# Patient Record
Sex: FEMALE | Race: WHITE | NOT HISPANIC OR LATINO | Employment: OTHER | URBAN - METROPOLITAN AREA
[De-identification: names, ages, dates, MRNs, and addresses within clinical notes are randomized per-mention and may not be internally consistent; named-entity substitution may affect disease eponyms.]

---

## 2019-03-19 ENCOUNTER — MEDICAL CORRESPONDENCE (OUTPATIENT)
Dept: HEALTH INFORMATION MANAGEMENT | Facility: CLINIC | Age: 39
End: 2019-03-19

## 2020-01-08 ENCOUNTER — TRANSFERRED RECORDS (OUTPATIENT)
Dept: HEALTH INFORMATION MANAGEMENT | Facility: CLINIC | Age: 40
End: 2020-01-08

## 2020-01-08 LAB
CREATININE (EXTERNAL): 0.82 MG/DL (ref 0.57–1)
GFR ESTIMATED (EXTERNAL): 90 ML/MIN/1.73
GFR ESTIMATED (IF AFRICAN AMERICAN) (EXTERNAL): 104 ML/MIN/1.73
GLUCOSE (EXTERNAL): 87 MG/DL (ref 65–99)
POTASSIUM (EXTERNAL): 4.1 MMOL/L (ref 3.5–5.2)

## 2020-04-20 ENCOUNTER — TRANSFERRED RECORDS (OUTPATIENT)
Dept: HEALTH INFORMATION MANAGEMENT | Facility: CLINIC | Age: 40
End: 2020-04-20

## 2021-05-19 ENCOUNTER — TRANSFERRED RECORDS (OUTPATIENT)
Dept: HEALTH INFORMATION MANAGEMENT | Facility: CLINIC | Age: 41
End: 2021-05-19

## 2021-05-19 LAB
ALT SERPL-CCNC: 13 IU/L (ref 0–32)
AST SERPL-CCNC: 19 IU/L (ref 0–40)
CHOLESTEROL (EXTERNAL): 175 MG/DL (ref 100–199)
CREATININE (EXTERNAL): 0.79 MG/DL (ref 0.57–1)
GFR ESTIMATED (EXTERNAL): 93 ML/MIN/1.73
GFR ESTIMATED (IF AFRICAN AMERICAN) (EXTERNAL): 108 ML/MIN/1.73
GLUCOSE (EXTERNAL): 88 MG/DL (ref 65–99)
HDLC SERPL-MCNC: 51 MG/DL
HPV ABSTRACT: NORMAL
LDL CHOLESTEROL CALCULATED (EXTERNAL): 91 MG/DL (ref 0–99)
PAP-ABSTRACT: NORMAL
POTASSIUM (EXTERNAL): 4 MMOL/L (ref 3.5–5.2)
TRIGLYCERIDES (EXTERNAL): 196 MG/DL (ref 0–149)
TSH SERPL-ACNC: 3.35 UIU/ML (ref 0.45–4.5)

## 2021-05-20 ENCOUNTER — TRANSFERRED RECORDS (OUTPATIENT)
Dept: HEALTH INFORMATION MANAGEMENT | Facility: CLINIC | Age: 41
End: 2021-05-20

## 2021-05-24 ENCOUNTER — TRANSFERRED RECORDS (OUTPATIENT)
Dept: HEALTH INFORMATION MANAGEMENT | Facility: CLINIC | Age: 41
End: 2021-05-24

## 2022-01-04 ENCOUNTER — LAB REQUISITION (OUTPATIENT)
Dept: LAB | Facility: CLINIC | Age: 42
End: 2022-01-04

## 2022-01-04 DIAGNOSIS — Z20.828 CONTACT WITH AND (SUSPECTED) EXPOSURE TO OTHER VIRAL COMMUNICABLE DISEASES: ICD-10-CM

## 2022-01-04 PROCEDURE — U0003 INFECTIOUS AGENT DETECTION BY NUCLEIC ACID (DNA OR RNA); SEVERE ACUTE RESPIRATORY SYNDROME CORONAVIRUS 2 (SARS-COV-2) (CORONAVIRUS DISEASE [COVID-19]), AMPLIFIED PROBE TECHNIQUE, MAKING USE OF HIGH THROUGHPUT TECHNOLOGIES AS DESCRIBED BY CMS-2020-01-R: HCPCS | Performed by: NURSE PRACTITIONER

## 2022-01-05 LAB — SARS-COV-2 RNA RESP QL NAA+PROBE: POSITIVE

## 2022-01-08 ENCOUNTER — TRANSFERRED RECORDS (OUTPATIENT)
Dept: HEALTH INFORMATION MANAGEMENT | Facility: CLINIC | Age: 42
End: 2022-01-08
Payer: COMMERCIAL

## 2022-05-19 ENCOUNTER — TRANSFERRED RECORDS (OUTPATIENT)
Dept: HEALTH INFORMATION MANAGEMENT | Facility: CLINIC | Age: 42
End: 2022-05-19
Payer: COMMERCIAL

## 2022-06-01 ENCOUNTER — TRANSFERRED RECORDS (OUTPATIENT)
Dept: HEALTH INFORMATION MANAGEMENT | Facility: CLINIC | Age: 42
End: 2022-06-01
Payer: COMMERCIAL

## 2022-06-07 ENCOUNTER — OFFICE VISIT (OUTPATIENT)
Dept: FAMILY MEDICINE | Facility: CLINIC | Age: 42
End: 2022-06-07
Payer: COMMERCIAL

## 2022-06-07 VITALS
TEMPERATURE: 97.9 F | DIASTOLIC BLOOD PRESSURE: 79 MMHG | SYSTOLIC BLOOD PRESSURE: 113 MMHG | HEIGHT: 67 IN | HEART RATE: 66 BPM | OXYGEN SATURATION: 96 % | WEIGHT: 178 LBS | BODY MASS INDEX: 27.94 KG/M2

## 2022-06-07 DIAGNOSIS — J02.9 ACUTE PHARYNGITIS, UNSPECIFIED ETIOLOGY: ICD-10-CM

## 2022-06-07 DIAGNOSIS — Z11.3 ROUTINE SCREENING FOR STI (SEXUALLY TRANSMITTED INFECTION): Primary | ICD-10-CM

## 2022-06-07 DIAGNOSIS — F41.9 ANXIETY: ICD-10-CM

## 2022-06-07 PROCEDURE — 86780 TREPONEMA PALLIDUM: CPT | Performed by: NURSE PRACTITIONER

## 2022-06-07 PROCEDURE — 87491 CHLMYD TRACH DNA AMP PROBE: CPT | Performed by: NURSE PRACTITIONER

## 2022-06-07 PROCEDURE — 87389 HIV-1 AG W/HIV-1&-2 AB AG IA: CPT | Performed by: NURSE PRACTITIONER

## 2022-06-07 PROCEDURE — 87591 N.GONORRHOEAE DNA AMP PROB: CPT | Performed by: NURSE PRACTITIONER

## 2022-06-07 RX ORDER — AMOXICILLIN 875 MG
875 TABLET ORAL DAILY
COMMUNITY
End: 2022-07-01

## 2022-06-07 RX ORDER — ALPRAZOLAM 0.25 MG
0.25 TABLET ORAL 3 TIMES DAILY PRN
Qty: 10 TABLET | Refills: 0 | Status: SHIPPED | OUTPATIENT
Start: 2022-06-07 | End: 2022-10-05

## 2022-06-07 NOTE — PATIENT INSTRUCTIONS
Tonsillitis resolving  Complete augmentin  Maintain hydration  Salt water gargles  Return or seek care if recurrent symptoms    Anxiety  Obtain records; nothing on   10 pills 0.25mg up to 3 times per day for anxiety sent to pharmacy. No refills until records received and reviewed.    Schedule PE for other concerns

## 2022-06-07 NOTE — NURSING NOTE
"ROOM:[address] [place], [place] [address]ELIAZAR    Preferred Name: Mindy     42 year old  Chief Complaint   Patient presents with     hospital follow up     establish care       Blood pressure 113/79, pulse 66, temperature 97.9  F (36.6  C), temperature source Oral, height 1.702 m (5' 7\"), weight 80.7 kg (178 lb), last menstrual period 06/05/2022, SpO2 96 %. Body mass index is 27.88 kg/m .  BP completed using cuff size:    There is no problem list on file for this patient.      Wt Readings from Last 2 Encounters:   06/07/22 80.7 kg (178 lb)     BP Readings from Last 3 Encounters:   06/07/22 113/79       No Known Allergies    Current Outpatient Medications   Medication     amoxicillin (AMOXIL) 875 MG tablet     No current facility-administered medications for this visit.       Social History     Tobacco Use     Smoking status: Former Smoker     Smokeless tobacco: Never Used   Vaping Use     Vaping Use: Never used   Substance Use Topics     Alcohol use: Not Currently     Comment: former     Drug use: Never       Honoring Choices - Health Care Directive Guide offered to patient at time of visit.    Health Maintenance Due   Topic Date Due     PREVENTIVE CARE VISIT  Never done     ADVANCE CARE PLANNING  Never done     HIV SCREENING  Never done     HEPATITIS C SCREENING  Never done     PAP  Never done       Immunization History   Administered Date(s) Administered     COVID-19,PF,Pfizer 12+ Yrs (2022 and After) 04/08/2022       No results found for: PAP    No lab results found.    PHQ-2 ( 1999 Pfizer) 6/7/2022   Q1: Little interest or pleasure in doing things 1   Q2: Feeling down, depressed or hopeless 1   PHQ-2 Score 2       No flowsheet data found.    No flowsheet data found.    No flowsheet data found.    Memo Seals    June 7, 2022 10:04 AM    "

## 2022-06-07 NOTE — LETTER
June 8, 2022      Mindy VIRI La  [address] [place] [id number]        Dear ,    We are writing to inform you of your test results.    Your test results fall within the expected range(s) or remain unchanged from previous results.  Please continue with current treatment plan.    Resulted Orders   NEISSERIA GONORRHOEA PCR   Result Value Ref Range    Neisseria gonorrhoeae Negative Negative      Comment:      Negative for N. gonorrhoeae rRNA by transcription mediated amplification. A negative result by transcription mediated amplification does not preclude the presence of C. trachomatis infection because results are dependent on proper and adequate collection, absence of inhibitors and sufficient rRNA to be detected.   CHLAMYDIA TRACHOMATIS PCR   Result Value Ref Range    Chlamydia trachomatis Negative Negative      Comment:      A negative result by transcription mediated amplification does not preclude the presence of C. trachomatis infection because results are dependent on proper and adequate collection, absence of inhibitors and sufficient rRNA to be detected.   NEISSERIA GONORRHOEA PCR   Result Value Ref Range    Neisseria gonorrhoeae Negative Negative      Comment:      Negative for N. gonorrhoeae rRNA by transcription mediated amplification. A negative result by transcription mediated amplification does not preclude the presence of C. trachomatis infection because results are dependent on proper and adequate collection, absence of inhibitors and sufficient rRNA to be detected.   CHLAMYDIA TRACHOMATIS PCR   Result Value Ref Range    Chlamydia trachomatis Negative Negative      Comment:      A negative result by transcription mediated amplification does not preclude the presence of C. trachomatis infection because results are dependent on proper and adequate collection, absence of inhibitors and sufficient rRNA to be detected.   HIV Antigen Antibody Combo   Result Value Ref Range     HIV Antigen Antibody Combo Nonreactive Nonreactive      Comment:      HIV-1 p24 Ag & HIV-1/HIV-2 Ab Not Detected   Treponema Abs w Reflex to RPR and Titer   Result Value Ref Range    Treponema Antibody Total Nonreactive Nonreactive     Hi Mindy, all of your testing for sexually transmitted infections are negative. Thank you.   If you have any questions or concerns, please call the clinic at the number listed above.       Sincerely,      MELA Mathur CNP

## 2022-06-08 LAB
C TRACH DNA SPEC QL NAA+PROBE: NEGATIVE
C TRACH DNA SPEC QL NAA+PROBE: NEGATIVE
HIV 1+2 AB+HIV1 P24 AG SERPL QL IA: NONREACTIVE
N GONORRHOEA DNA SPEC QL NAA+PROBE: NEGATIVE
N GONORRHOEA DNA SPEC QL NAA+PROBE: NEGATIVE
T PALLIDUM AB SER QL: NONREACTIVE

## 2022-06-13 ENCOUNTER — OFFICE VISIT (OUTPATIENT)
Dept: FAMILY MEDICINE | Facility: CLINIC | Age: 42
End: 2022-06-13

## 2022-06-13 VITALS
HEART RATE: 69 BPM | OXYGEN SATURATION: 96 % | HEIGHT: 67 IN | SYSTOLIC BLOOD PRESSURE: 110 MMHG | DIASTOLIC BLOOD PRESSURE: 72 MMHG | WEIGHT: 178 LBS | TEMPERATURE: 98.4 F | BODY MASS INDEX: 27.94 KG/M2

## 2022-06-13 DIAGNOSIS — Z87.898 HISTORY OF MOTION SICKNESS: ICD-10-CM

## 2022-06-13 DIAGNOSIS — U09.9 COVID-19 LONG HAULER: ICD-10-CM

## 2022-06-13 DIAGNOSIS — Z00.00 ROUTINE HISTORY AND PHYSICAL EXAMINATION OF ADULT: Primary | ICD-10-CM

## 2022-06-13 DIAGNOSIS — F90.9 ATTENTION DEFICIT HYPERACTIVITY DISORDER (ADHD), UNSPECIFIED ADHD TYPE: ICD-10-CM

## 2022-06-13 DIAGNOSIS — F41.9 ANXIETY: ICD-10-CM

## 2022-06-13 RX ORDER — SCOLOPAMINE TRANSDERMAL SYSTEM 1 MG/1
1 PATCH, EXTENDED RELEASE TRANSDERMAL
Qty: 3 PATCH | Refills: 0 | Status: SHIPPED | OUTPATIENT
Start: 2022-06-13 | End: 2022-10-05

## 2022-06-13 NOTE — PATIENT INSTRUCTIONS
COVID-19 Recovery Resources  General:  [facility]: https://www.health.[facility].mn./diseases/longcovid/resources.html  [facility] Long Term Covid-19 Care: [email or web address]/long-term-covid-care  Bouncing back from Covid-19: https://www.[facility]/physical_medicine_rehabilitation/coronavirus-rehabilitation/_files/impact-of-covid-patient-recovery.pdf  Your Covid Recovery: https://www.yourcovidrecovery.nhs.uk/  Recovering from Covid-19: A Patient Guide: https://www.[facility]/rehab/sites/default/files/documents/post_covid_rehab_-_patient_guide_1.pdf  Mobile missy: COVID     Loss of taste/smell:  Abscent: https://abscent.org/  Fifth Sense: https://www.fifthsense.org.uk/    Fatigue:  How to Conserve Your Energy: https://www.rcot.co.uk/conserving-energy    Shortness of breath:  Breathing Exercises: https://www.[facility]/health/conditions-and-diseases/coronavirus/coronavirus-recovery-breathing-exercises    Disability Resources:  For work: https://www.hhs.gov/civil-rights/for-providers/jclij-bmzimi-ewlyr29/guidance-long-covid-disability/index.html#juworxst04_0cs4kro  For school: https://www2.ed.gov/about/offices/list/ocr/docs/lvz-jujngrmbw-242-72424021.pdf

## 2022-06-13 NOTE — NURSING NOTE
"ROOM:1  ROSENDO CRUZ    Preferred Name: Mindy     42 year old  Chief Complaint   Patient presents with     Physical     Tonsils, skin check, long covid, anti nausea medication     Flu Shot       Blood pressure 110/72, pulse 69, temperature 98.4  F (36.9  C), temperature source Oral, height 1.702 m (5' 7\"), weight 80.7 kg (178 lb), last menstrual period 06/05/2022, SpO2 96 %. Body mass index is 27.88 kg/m .  BP completed using cuff size:    There is no problem list on file for this patient.      Wt Readings from Last 2 Encounters:   06/13/22 80.7 kg (178 lb)   06/07/22 80.7 kg (178 lb)     BP Readings from Last 3 Encounters:   06/13/22 110/72   06/07/22 113/79       Allergies   Allergen Reactions     Banana Itching       Current Outpatient Medications   Medication     ALPRAZolam (XANAX) 0.25 MG tablet     amoxicillin (AMOXIL) 875 MG tablet     No current facility-administered medications for this visit.       Social History     Tobacco Use     Smoking status: Former Smoker     Smokeless tobacco: Never Used   Vaping Use     Vaping Use: Never used   Substance Use Topics     Alcohol use: Not Currently     Comment: former     Drug use: Never       Honoring Choices - Health Care Directive Guide offered to patient at time of visit.    Health Maintenance Due   Topic Date Due     PREVENTIVE CARE VISIT  Never done     ADVANCE CARE PLANNING  Never done     HEPATITIS C SCREENING  Never done       Immunization History   Administered Date(s) Administered     COVID-19,PF,Pfizer 12+ Yrs (2022 and After) 04/08/2022     TDAP Vaccine (Boostrix) 06/17/2019       No results found for: PAP    No lab results found.    PHQ-2 ( 1999 Pfizer) 6/7/2022   Q1: Little interest or pleasure in doing things 1   Q2: Feeling down, depressed or hopeless 1   PHQ-2 Score 2       No flowsheet data found.    No flowsheet data found.    No flowsheet data found.    Renetta Mena    June 13, 2022 8:50 AM    "

## 2022-06-13 NOTE — PROGRESS NOTES
"     ANNUAL WELLNESS EXAM     Today's Date: Jun 13, 2022     Patient Vidhi Tovar 1980 presents to the clinic today for a preventative health visit.         SUBJECTIVE     History of Present Illness:    - Mindy presents to the NP clinic today for routine wellness exam and to discuss several health questions. She notes that she feels in an overall good state of health, no acute symptoms of chest pain, SOB, fevers, N/V/D, or abdominal pain at today's visit. No vaginal symptoms or breast concerns. Last pap was 2 years ago and results were normal.   - Just completed amoxicillin yesterday for tonsillitis, notes improvement in tonsillar swelling.  - Had Covid-19 in January of this year. Notes continued symptoms of \"decreased mental acuity, fatigue, and lack of smell & taste.\" Has been saying \"no\" to work tasks more often to help compensate for these symptoms. Expresses that these symptoms negatively impact mood. Is fully vaccinated and boosted against Covid-19.  - Is traveling to [place] in coming months. Will be on a boat for 7 days while on this vacation. Would like to have a medication to use for motion sickness prophylaxis.       Allergies   Allergen Reactions     Banana Itching      Current Outpatient Medications   Medication Instructions     ALPRAZolam (XANAX) 0.25 mg, Oral, 3 TIMES DAILY PRN     amoxicillin (AMOXIL) 875 mg, Oral, DAILY, Take 1 tablet by mouth every 12 hours for 10 days.     Past Medical History:   Diagnosis Date     Fear of flying      Herpes simplex virus infection      Rosacea      Vitamin D deficiency      Vitiligo       Family History   Problem Relation Age of Onset     Hypertension Father      Substance Abuse Maternal Grandmother      Diabetes Maternal Grandmother      Cancer Maternal Grandfather      Cancer Paternal Grandmother      Substance Abuse Paternal Grandfather      Depression Brother       Do you have a first-degree relative with a history of the following:  A. Cancer of the " "breast or ovaries - No   B. Heart attack, heart pain, or stroke before the age of 55 - No  C. Unexplained death from drowning or car accident - No  D. Osteoporosis or any other significant bone health concerns - No    Social History     Tobacco Use     Smoking status: Former Smoker     Smokeless tobacco: Never Used   Vaping Use     Vaping Use: Never used   Substance Use Topics     Alcohol use: Not Currently     Comment: former     Drug use: Yes     Types: Marijuana, Psilocybin     Comment: occasional use      History   Sexual Activity     Sexual activity: Not Currently     Partners: Male, Female     Birth control/ protection: I.U.D.       PHQ-2 Score:   PHQ-2 ( 1999 Pfizer) 6/7/2022   Q1: Little interest or pleasure in doing things 1   Q2: Feeling down, depressed or hopeless 1   PHQ-2 Score 2     Immunization History   Administered Date(s) Administered     COVID-19,PF,Pfizer 12+ Yrs (2022 and After) 04/08/2022     TDAP Vaccine (Boostrix) 06/17/2019      Health Maintenance Due   Topic Date Due     PREVENTIVE CARE VISIT  Never done     ADVANCE CARE PLANNING  Never done     HEPATITIS C SCREENING  Never done      Health Maintenance components reviewed - Seasonal Influenza vaccine status is up to date & Covid-19 vaccine status is up to date.    Diet: in general, a \"healthy\" diet  , well balanced    Exercise: no concerns or questions addressed    Unknown LMP due to presence of IUD.     ROS:  10 point ROS of systems including Constitutional, Eyes, Respiratory, Cardiovascular, Gastroenterology, Genitourinary, Integumentary, Muscularskeletal, Psychiatric were all negative except for pertinent positives noted in my HPI.         OBJECTIVE     /72   Pulse 69   Temp 98.4  F (36.9  C) (Oral)   Ht 1.702 m (5' 7\")   Wt 80.7 kg (178 lb)   LMP 06/05/2022   SpO2 96%   BMI 27.88 kg/m       Constitutional: Awake, alert, cooperative, no apparent distress, and appears stated age.  Eyes: Lids and lashes normal, pupils equal, " round and reactive to light, extra ocular muscles intact, sclera clear, conjunctiva normal.  ENT: Normocephalic, without obvious abnormality, atramatic, sinuses nontender on palpation, external ears without lesions, oral pharynx with moist mucus membranes, tonsils without erythema or exudates, gums normal and good dentition.  Neck: Supple, symmetrical, trachea midline, no adenopathy, thyroid symmetric, not enlarged and no tenderness, skin normal.  Hematologic / Lymphatic: No cervical lymphadenopathy and no supraclavicular lymphadenopathy.  Back: Symmetric, no curvature  Lungs: No increased work of breathing, good air exchange, clear to auscultation bilaterally, no crackles or wheezing.  Cardiovascular: Regular rate and rhythm, normal S1 and S2, no S3 or S4, and no murmur noted.  Chest / Breast: Breasts symmetrical, skin without lesion(s), no nipple retraction or dimpling, no nipple discharge, no masses palpated, no axillary or supraclavicular adenopathy.  Abdomen: No scars, normal bowel sounds, soft, non-distended, non-tender, no masses palpated, no hepatosplenomegally.  Genitourinary: Deferred.  Musculoskeletal: No redness, warmth, or swelling of the joints.  Full range of motion noted.  Motor strength is 5 out of 5 all extremities bilaterally.  Tone is normal.  Neurologic: Awake, alert, oriented to name, place and time.  Cranial nerves II-XII are grossly intact.  Sensory is intact. Gait is normal.  Neuropsychiatric: Normal affect, mood, orientation, memory and insight.  Skin: No rashes, erythema, pallor, petechia or purpura. Hypopigmented patches evident across limbs and trunk.         ASSESSMENT/PLAN     (Z00.00) Routine history and physical examination of adult  (primary encounter diagnosis)  - Pap completed 2 years ago & WNL, repeat co-testing due in 2025  - Lipid panel reflex to direct LDL Fasting  - TSH with free T4 reflex  - Hepatitis C Screen Reflex to HCV RNA Quant and Genotype  - Basic metabolic panel  -  Hemoglobin with Reflex to Iron Studies   - Hemoglobin A1c  - Screening Mammogram Digital Bilateral    (F90.9) Attention deficit hyperactivity disorder (ADHD), unspecified ADHD type  - Not formally diagnosed. Therapist recommends testing due to neurodivergent patterns.   - Adult Mental Health  Referral    (Z87.898) History of motion sickness  - scopolamine (TRANSDERM-SCOP, 1.5 MG,) 1 MG/3DAYS 72 hr patch    (F41.9) Anxiety  - Continue with therapy visits as scheduled  - Continue alprazolam as needed  - RTC if symptoms increase to discuss daily medication such as selective serotonin reuptake inhibitor    (U09.9) COVID-19 long hauler  - Provided with resources to combat long-haul symptoms  - Discussed potential referral to adult post-covid clinic, patient declined today    -Discussed/Reinforced healthy diety, lifestyle, exercise and safety.  -Recommended completion of routine dental and eye exam.  -Lab screenings completed today. Results pending.     Follow-Up:  Follow up in one year, or sooner if needed.     Patient engaged in their plan of care. Patient verbalized understanding and agreed with the final plan.  AVS printed and given to patient.    MELA Turk CNP   [facility]  [facility]  [address] [address] [phone]

## 2022-06-16 ENCOUNTER — TRANSFERRED RECORDS (OUTPATIENT)
Dept: HEALTH INFORMATION MANAGEMENT | Facility: CLINIC | Age: 42
End: 2022-06-16
Payer: COMMERCIAL

## 2022-06-16 LAB
ALT SERPL-CCNC: 28 IU/L (ref 0–32)
AST SERPL-CCNC: 21 IU/L (ref 0–40)

## 2022-06-21 ENCOUNTER — LAB (OUTPATIENT)
Dept: LAB | Facility: CLINIC | Age: 42
End: 2022-06-21
Payer: COMMERCIAL

## 2022-06-21 DIAGNOSIS — Z00.00 ROUTINE HISTORY AND PHYSICAL EXAMINATION OF ADULT: ICD-10-CM

## 2022-06-21 LAB
HGB BLD-MCNC: 13 G/DL (ref 11.7–15.7)
HOLD SPECIMEN: NORMAL

## 2022-06-21 PROCEDURE — 83036 HEMOGLOBIN GLYCOSYLATED A1C: CPT | Performed by: NURSE PRACTITIONER

## 2022-06-21 PROCEDURE — 84443 ASSAY THYROID STIM HORMONE: CPT | Performed by: NURSE PRACTITIONER

## 2022-06-21 PROCEDURE — 85018 HEMOGLOBIN: CPT | Performed by: NURSE PRACTITIONER

## 2022-06-21 PROCEDURE — 80061 LIPID PANEL: CPT | Performed by: NURSE PRACTITIONER

## 2022-06-21 PROCEDURE — 82310 ASSAY OF CALCIUM: CPT | Performed by: NURSE PRACTITIONER

## 2022-06-21 PROCEDURE — 86803 HEPATITIS C AB TEST: CPT | Performed by: NURSE PRACTITIONER

## 2022-06-22 ENCOUNTER — VIRTUAL VISIT (OUTPATIENT)
Dept: PSYCHOLOGY | Facility: CLINIC | Age: 42
End: 2022-06-22
Attending: NURSE PRACTITIONER
Payer: COMMERCIAL

## 2022-06-22 DIAGNOSIS — Z13.39 ATTENTION DEFICIT HYPERACTIVITY DISORDER (ADHD) EVALUATION: Primary | ICD-10-CM

## 2022-06-22 LAB
ANION GAP SERPL CALCULATED.3IONS-SCNC: 13 MMOL/L (ref 7–15)
BUN SERPL-MCNC: 7.3 MG/DL (ref 6–20)
CALCIUM SERPL-MCNC: 8.9 MG/DL (ref 8.6–10)
CHLORIDE SERPL-SCNC: 103 MMOL/L (ref 98–107)
CHOLEST SERPL-MCNC: 199 MG/DL
CREAT SERPL-MCNC: 0.81 MG/DL (ref 0.51–0.95)
DEPRECATED HCO3 PLAS-SCNC: 19 MMOL/L (ref 22–29)
GFR SERPL CREATININE-BSD FRML MDRD: >90 ML/MIN/1.73M2
GLUCOSE SERPL-MCNC: 81 MG/DL (ref 70–99)
HCV AB SERPL QL IA: NONREACTIVE
HDLC SERPL-MCNC: 41 MG/DL
LDLC SERPL CALC-MCNC: 104 MG/DL
NONHDLC SERPL-MCNC: 158 MG/DL
POTASSIUM SERPL-SCNC: 4.1 MMOL/L (ref 3.4–4.5)
SODIUM SERPL-SCNC: 135 MMOL/L (ref 136–145)
TRIGL SERPL-MCNC: 268 MG/DL
TSH SERPL DL<=0.005 MIU/L-ACNC: 3.23 UIU/ML (ref 0.3–4.2)

## 2022-06-22 PROCEDURE — 90837 PSYTX W PT 60 MINUTES: CPT | Mod: 95 | Performed by: PSYCHOLOGIST

## 2022-06-22 ASSESSMENT — ANXIETY QUESTIONNAIRES
1. FEELING NERVOUS, ANXIOUS, OR ON EDGE: SEVERAL DAYS
8. IF YOU CHECKED OFF ANY PROBLEMS, HOW DIFFICULT HAVE THESE MADE IT FOR YOU TO DO YOUR WORK, TAKE CARE OF THINGS AT HOME, OR GET ALONG WITH OTHER PEOPLE?: SOMEWHAT DIFFICULT
GAD7 TOTAL SCORE: 5
7. FEELING AFRAID AS IF SOMETHING AWFUL MIGHT HAPPEN: NOT AT ALL
8. IF YOU CHECKED OFF ANY PROBLEMS, HOW DIFFICULT HAVE THESE MADE IT FOR YOU TO DO YOUR WORK, TAKE CARE OF THINGS AT HOME, OR GET ALONG WITH OTHER PEOPLE?: SOMEWHAT DIFFICULT
1. FEELING NERVOUS, ANXIOUS, OR ON EDGE: SEVERAL DAYS
5. BEING SO RESTLESS THAT IT IS HARD TO SIT STILL: SEVERAL DAYS
3. WORRYING TOO MUCH ABOUT DIFFERENT THINGS: NOT AT ALL
6. BECOMING EASILY ANNOYED OR IRRITABLE: MORE THAN HALF THE DAYS
3. WORRYING TOO MUCH ABOUT DIFFERENT THINGS: NOT AT ALL
4. TROUBLE RELAXING: NOT AT ALL
GAD7 TOTAL SCORE: 5
GAD7 TOTAL SCORE: 5
2. NOT BEING ABLE TO STOP OR CONTROL WORRYING: SEVERAL DAYS
6. BECOMING EASILY ANNOYED OR IRRITABLE: MORE THAN HALF THE DAYS
2. NOT BEING ABLE TO STOP OR CONTROL WORRYING: SEVERAL DAYS
GAD7 TOTAL SCORE: 5
7. FEELING AFRAID AS IF SOMETHING AWFUL MIGHT HAPPEN: NOT AT ALL
5. BEING SO RESTLESS THAT IT IS HARD TO SIT STILL: SEVERAL DAYS
4. TROUBLE RELAXING: NOT AT ALL
7. FEELING AFRAID AS IF SOMETHING AWFUL MIGHT HAPPEN: NOT AT ALL
GAD7 TOTAL SCORE: 5
7. FEELING AFRAID AS IF SOMETHING AWFUL MIGHT HAPPEN: NOT AT ALL
GAD7 TOTAL SCORE: 5

## 2022-06-22 ASSESSMENT — COLUMBIA-SUICIDE SEVERITY RATING SCALE - C-SSRS
3. HAVE YOU BEEN THINKING ABOUT HOW YOU MIGHT KILL YOURSELF?: NO
1. IN THE PAST MONTH, HAVE YOU WISHED YOU WERE DEAD OR WISHED YOU COULD GO TO SLEEP AND NOT WAKE UP?: YES
6. HAVE YOU EVER DONE ANYTHING, STARTED TO DO ANYTHING, OR PREPARED TO DO ANYTHING TO END YOUR LIFE?: NO
5. HAVE YOU STARTED TO WORK OUT OR WORKED OUT THE DETAILS OF HOW TO KILL YOURSELF? DO YOU INTEND TO CARRY OUT THIS PLAN?: NO
2. HAVE YOU ACTUALLY HAD ANY THOUGHTS OF KILLING YOURSELF IN THE PAST MONTH?: NO
4. HAVE YOU HAD THESE THOUGHTS AND HAD SOME INTENTION OF ACTING ON THEM?: NO

## 2022-06-22 NOTE — PROGRESS NOTES
[facility]   Mental Health & Addiction Services     Progress Note - Initial Visit    Patient  Name:  Vidhi Tovar Date: 2022         Service Type: Individual     Visit Start Time: 910  Visit End Time: 1003    Visit #: 1    Attendees: Client    Service Modality:  Video Visit:      Provider verified identity through the following two step process.  Patient provided:  Patient     Telemedicine Visit: The patient's condition can be safely assessed and treated via synchronous audio and visual telemedicine encounter.      Reason for Telemedicine Visit: Services only offered telehealth    Originating Site (Patient Location): Patient's home    Distant Site (Provider Location): Provider Remote Setting- Home Office    Consent:  The patient/guardian has verbally consented to: the potential risks and benefits of telemedicine (video visit) versus in person care; bill my insurance or make self-payment for services provided; and responsibility for payment of non-covered services.     Patient would like the video invitation sent by:  My Chart    Mode of Communication:  Video Conference via Amwell    As the provider I attest to compliance with applicable laws and regulations related to telemedicine.       DATA:   Interactive Complexity: No   Crisis: No     Presenting Concerns/  Current Stressors:   ADHD Eval      ASSESSMENT:  Mental Status Assessment:  Appearance:   Appropriate   Eye Contact:   Good   Psychomotor Behavior: Normal   Attitude:   Cooperative   Orientation:   All  Speech   Rate / Production: Normal/ Responsive   Volume:  Normal   Mood:    Dysphoric  Affect:    Appropriate   Thought Content:  Clear   Thought Form:  Coherent  Goal Directed   Insight:    Fair       Safety Issues and Plan for Safety and Risk Management:     [place] Suicide Severity Rating Scale (Short Version)  [place] Suicide Severity Rating (Short Version) 2022   Q1 Wished to be Dead (Past Month) yes   Q2 Suicidal  Thoughts (Past Month) no   Q3 Suicidal Thought Method no   Q4 Suicidal Intent without Specific Plan no   Q5 Suicide Intent with Specific Plan no   Q6 Suicide Behavior (Lifetime) no   Level of Risk per Screen low risk     Patient denies current fears or concerns for personal safety.  Patient denies current or recent suicidal ideation or behaviors.  Patient denies current or recent homicidal ideation or behaviors.  Patient denies current or recent self injurious behavior or ideation.  Patient denies other safety concerns.  Recommended that patient call 911 or go to the local ED should there be a change in any of these risk factors.  Patient reports there are no firearms in the house.     Diagnostic Criteria:  Alcohol Abuse  MDD  Rule Out ADHD  WHODAS 2.0 (12 item): No flowsheet data found.  Intervention:   During today s session, this writer outlined the expectations and purpose of the ADHD Evaluation. The client outlined their reason for referral and symptoms. This writer used the Adult ADHD Evaluation Intake Form to guide the clinical interview; it was not finished. Their PHQ-9 and DIONNE-7 scores were unavailable for reviewed. Two additional sessions were scheduled to complete the clinical interview.   Collateral Reports Completed:  Routed note to Care Team Member(s)      PLAN: (Homework, other):  1. Provider will continue Diagnostic Assessment.  2.  Suicide Risk and Safety Concerns were assessed for Vidhi Tovar.          Bibiana Cochran, PhD LP  June 22, 2022

## 2022-06-23 LAB — HBA1C MFR BLD: 5.6 %

## 2022-06-28 ENCOUNTER — APPOINTMENT (OUTPATIENT)
Dept: LAB | Facility: CLINIC | Age: 42
End: 2022-06-28
Payer: COMMERCIAL

## 2022-06-28 PROCEDURE — U0003 INFECTIOUS AGENT DETECTION BY NUCLEIC ACID (DNA OR RNA); SEVERE ACUTE RESPIRATORY SYNDROME CORONAVIRUS 2 (SARS-COV-2) (CORONAVIRUS DISEASE [COVID-19]), AMPLIFIED PROBE TECHNIQUE, MAKING USE OF HIGH THROUGHPUT TECHNOLOGIES AS DESCRIBED BY CMS-2020-01-R: HCPCS | Performed by: NURSE PRACTITIONER

## 2022-06-28 PROCEDURE — 87651 STREP A DNA AMP PROBE: CPT | Performed by: NURSE PRACTITIONER

## 2022-06-29 ENCOUNTER — VIRTUAL VISIT (OUTPATIENT)
Dept: PSYCHOLOGY | Facility: CLINIC | Age: 42
End: 2022-06-29
Payer: COMMERCIAL

## 2022-06-29 DIAGNOSIS — Z13.39 ATTENTION DEFICIT HYPERACTIVITY DISORDER (ADHD) EVALUATION: ICD-10-CM

## 2022-06-29 DIAGNOSIS — F41.9 ANXIETY: Primary | ICD-10-CM

## 2022-06-29 PROCEDURE — 90837 PSYTX W PT 60 MINUTES: CPT | Mod: 95 | Performed by: PSYCHOLOGIST

## 2022-06-29 NOTE — PROGRESS NOTES
[facility]   Mental Health & Addiction Services     Progress Note - Initial Visit    Patient  Name:  Vidhi Tovar Date: 2022         Service Type: Individual     Visit Start Time: 706  Visit End Time: 800    Visit #: 2    Attendees: Client    Service Modality:  Video Visit:      Provider verified identity through the following two step process.  Patient provided:  Patient     Telemedicine Visit: The patient's condition can be safely assessed and treated via synchronous audio and visual telemedicine encounter.      Reason for Telemedicine Visit: Services only offered telehealth    Originating Site (Patient Location): Patient's home    Distant Site (Provider Location): Provider Remote Setting- Home Office    Consent:  The patient/guardian has verbally consented to: the potential risks and benefits of telemedicine (video visit) versus in person care; bill my insurance or make self-payment for services provided; and responsibility for payment of non-covered services.     Patient would like the video invitation sent by:  My Chart    Mode of Communication:  Video Conference via Amwell    As the provider I attest to compliance with applicable laws and regulations related to telemedicine.       DATA:   Interactive Complexity: No   Crisis: No    Extended Session (53+ minutes):   - Treatment protocol required additional time to complete, due to the nature of diagnosis being treated.  See Interventions section for details      Presenting Concerns/  Current Stressors:   ADHD Eval    ASSESSMENT:  Mental Status Assessment:  Appearance:   Appropriate   Eye Contact:   Good   Psychomotor Behavior: Normal  Restless   Attitude:   Cooperative   Orientation:   All  Speech   Rate / Production: Talkative   Volume:  Normal   Mood:    Anxious   Affect:    Appropriate   Thought Content:  Clear   Thought Form:  Coherent  Goal Directed   Insight:    Good  and Fair     Answers for HPI/ROS submitted by the  patient on 6/22/2022  DIONNE 7 TOTAL SCORE: 5    Safety Issues and Plan for Safety and Risk Management:     [place] Suicide Severity Rating Scale (Short Version)  [place] Suicide Severity Rating (Short Version) 6/22/2022   Q1 Wished to be Dead (Past Month) yes   Q2 Suicidal Thoughts (Past Month) no   Q3 Suicidal Thought Method no   Q4 Suicidal Intent without Specific Plan no   Q5 Suicide Intent with Specific Plan no   Q6 Suicide Behavior (Lifetime) no   Level of Risk per Screen low risk     Patient denies current fears or concerns for personal safety.  Patient denies current or recent suicidal ideation or behaviors.  Patient denies current or recent homicidal ideation or behaviors.  Patient denies current or recent self injurious behavior or ideation.  Patient denies other safety concerns.  Recommended that patient call 911 or go to the local ED should there be a change in any of these risk factors.  Patient reports there are no firearms in the house.     Diagnostic Criteria:  Anxiety  Rule Out ADHD  WHODAS 2.0 (12 item):   WHODAS 2.0 Total Score 6/22/2022   Total Score 26   [facility] Score [facility]     Intervention:   During today's session, this writer worked with Ms. Tovar to complete the Adult ADHD Evaluation Intake Form started last week; it was finished. This writer also began collecting general background information including interpersonal and chemical. The interview was not finished and a third visit was scheduled. She was sent the [facility] Reports.   Collateral Reports Completed:  Routed note to Care Team Member(s)      PLAN: (Homework, other):  1. Provider will continue Diagnostic Assessment. 2.  Suicide Risk and Safety Concerns were assessed for Vidhi Tovar.    3. Complete [facility] Reports      Bibiana Cochran, PhD LP  June 29, 2022

## 2022-07-01 ENCOUNTER — VIRTUAL VISIT (OUTPATIENT)
Dept: FAMILY MEDICINE | Facility: CLINIC | Age: 42
End: 2022-07-01
Payer: COMMERCIAL

## 2022-07-01 DIAGNOSIS — B34.9 NONSPECIFIC SYNDROME SUGGESTIVE OF VIRAL ILLNESS: Primary | ICD-10-CM

## 2022-07-01 NOTE — PATIENT INSTRUCTIONS
"Symptomatic Management of Viral Illness:  Stay well-hydrated. Drink Gatorade or Pedialyte if not eating well or experiencing dehydration.   Fluids/foods that are helpful with a sore throat include: hot tea, honey, lemon, soup or broth, and cough drops  Increase humidity to 30-40% in bedroom at night - vaporizer or humidifier use is recommended. If unable to obtain one, take warm showers with steam daily.   Vicks VapoRub as needed for cough and congestion relief  Saline nasal spray (NeilMed sinus rinse) or \"Neti Pot\" as needed for congestion relief  Maintain 8 hr minimum of sleep at night  Mucinex as needed for mucous thinning  Robitussin as needed for cough  Benadryl at night as needed for sleep  Flonase nasal spray daily as needed for congestion  Tylenol as needed for relief of muscle aches/pains or fever   - Be sure to read the labels of over-the-counter cough and cold medicine as many contain Tylenol in them.   - Do not take more than 3,000 MG of Tylenol in 24 hours    Please seek care if you develop:  Fever of 100.4 F ( 38.0 C) or higher  Symptoms that get worse, or new symptoms  Breathing not getting better with treatments  Symptoms that don t start to get better in 1 week  "

## 2022-07-01 NOTE — PROGRESS NOTES
Mindy is a 42 year old who is being evaluated via a billable video visit.      How would you like to obtain your AVS? MyChart  If the video visit is dropped, the invitation should be resent by: Text to cell phone: [phone]  Will anyone else be joining your video visit? No        Assessment & Plan     Nonspecific syndrome suggestive of viral illness  - Fever improving, symptoms non-suggestive of pneumonia, bacterial sinusitis, or other bacterial etiology.   - Able to maintain hydration, no concerns for dehydration.  - Advised continuation of supportive measures (see AVS for details).  - Strict instructions to present to UC/ER if symptoms progress over weekend for further evaluation.    Follow-up: Follow-up as needed or if symptoms worsen or fail to improve with supportive measures. Patient advised to go to urgent care if symptoms progress over weekend.     MELA Turk CNP  [facility]    Subjective   Mindy is a 42 year old presenting for the following health issues:  prolonged fever (Unwellness, has gotten better, dizziness, fever, fatigue)    HPI     Mindy reports symptoms began last Friday, 6/24. Symptoms include fever (highest of 101 F), chills, fatigue (sleeping 9-11 hrs nightly), dizziness (2x this last week), headache & neck pain, malaise, and decreased appetite. Denies chest pain, shortness of breath, N/V/D, lymphadenopathy, decreased taste/smell, sore throat, congestion, cough, eye or nasal itching, or neck rigidity. Has been taking NyQuil/DayQuil as needed. Will also take additional Tylenol as needed to bring fever down. Has not had a fever today, last temp was 98.7 without use of Tylenol. Has been staying up on PO intake and fluids. Went to a comedy show 2 weeks ago, no known sick contacts. Had Covid-19 PCR, rapid flu, and strep tests completed Tuesday 6/28 and all negative results. Has taken multiple at-home Covid-19 tests as well with negative results. Was treated with antibiotics 3  "weeks ago for tonsillitis, but notes good response after completing antibiotics and does not have enlarged tonsils or displaced uvula when asked today.     Review of Systems   Constitutional, HEENT, cardiovascular, pulmonary, GI, , musculoskeletal, neuro, skin, endocrine and psych systems are negative, except as otherwise noted.      Objective    Vitals - Patient Reported  Weight (Patient Reported): 81.6 kg (180 lb)  Height (Patient Reported): 170.2 cm (5' 7\")  BMI (Based on Pt Reported Ht/Wt): 28.19    Physical Exam   GENERAL: Alert and no distress  EYES: Eyes grossly normal to inspection.  No discharge or erythema, or obvious scleral/conjunctival abnormalities.  RESP: No audible wheeze, cough, or visible cyanosis.  No visible retractions or increased work of breathing.    SKIN: Visible skin clear. No significant rash, abnormal pigmentation or lesions.  NEURO: Cranial nerves grossly intact.  Mentation and speech appropriate for age.  PSYCH: Mentation appears normal, affect normal/bright, judgement and insight intact, normal speech and appearance well-groomed.    Video-Visit Details    Video Start Time: 2:00 PM    Type of service:  Video Visit    Video End Time:2:25 PM    Originating Location (pt. Location): Home    Distant Location (provider location):  [facility]     Platform used for Video Visit: Anthony    .  ..  "

## 2022-07-06 ENCOUNTER — OFFICE VISIT (OUTPATIENT)
Dept: FAMILY MEDICINE | Facility: CLINIC | Age: 42
End: 2022-07-06
Payer: COMMERCIAL

## 2022-07-06 VITALS
OXYGEN SATURATION: 96 % | BODY MASS INDEX: 27.94 KG/M2 | TEMPERATURE: 98.5 F | HEART RATE: 91 BPM | SYSTOLIC BLOOD PRESSURE: 110 MMHG | WEIGHT: 178 LBS | HEIGHT: 67 IN | DIASTOLIC BLOOD PRESSURE: 73 MMHG

## 2022-07-06 DIAGNOSIS — B34.9 NONSPECIFIC SYNDROME SUGGESTIVE OF VIRAL ILLNESS: Primary | ICD-10-CM

## 2022-07-06 NOTE — NURSING NOTE
"ROOM:2  GISELE CHAN    Preferred Name: Mindy     42 year old  Chief Complaint   Patient presents with     Fatigue     Dizziness     Nausea     Loss of appetite        Blood pressure 110/73, pulse 91, temperature 98.5  F (36.9  C), temperature source Oral, height 1.702 m (5' 7\"), weight 80.7 kg (178 lb), last menstrual period 07/01/2022, SpO2 96 %. Body mass index is 27.88 kg/m .  BP completed using cuff size:    There is no problem list on file for this patient.      Wt Readings from Last 2 Encounters:   07/06/22 80.7 kg (178 lb)   06/13/22 80.7 kg (178 lb)     BP Readings from Last 3 Encounters:   07/06/22 110/73   06/13/22 110/72   06/07/22 113/79       No Known Allergies    Current Outpatient Medications   Medication     ALPRAZolam (XANAX) 0.25 MG tablet     scopolamine (TRANSDERM-SCOP, 1.5 MG,) 1 MG/3DAYS 72 hr patch     No current facility-administered medications for this visit.       Social History     Tobacco Use     Smoking status: Former Smoker     Smokeless tobacco: Never Used   Vaping Use     Vaping Use: Never used   Substance Use Topics     Alcohol use: Not Currently     Comment: former     Drug use: Yes     Types: Marijuana, Psilocybin     Comment: occasional use       Honoring Choices - Health Care Directive Guide offered to patient at time of visit.    Health Maintenance Due   Topic Date Due     ADVANCE CARE PLANNING  Never done       Immunization History   Administered Date(s) Administered     COVID-19,PF,Pfizer 12+ Yrs (2022 and After) 04/08/2022     Influenza Vaccine, 6+MO IM (QUADRIVALENT W/PRESERVATIVES) 11/01/2019, 10/13/2020     TDAP Vaccine (Boostrix) 06/17/2019       No results found for: PAP    Recent Labs   Lab Test 06/21/22  0807 05/19/21  0000   A1C 5.6  --    *  --    HDL 41*  --    TRIG 268* 196*   CR 0.81  --    GFRESTIMATED >90  --    POTASSIUM 4.1  --    TSH 3.23  --        PHQ-2 ( 1999 Pfizer) 6/28/2022 6/7/2022   Q1: Little interest or pleasure in doing things 1 1   Q2: " Feeling down, depressed or hopeless 1 1   PHQ-2 Score 2 2   Q1: Little interest or pleasure in doing things Several days -   Q2: Feeling down, depressed or hopeless Several days -   PHQ-2 Score 2 -       No flowsheet data found.    DIONNE-7 SCORE 6/22/2022 6/22/2022   Total Score - 5 (mild anxiety)   Total Score 5 5       No flowsheet data found.    Memo Seals    July 6, 2022 1:38 PM

## 2022-07-06 NOTE — PROGRESS NOTES
"Assessment & Plan   Vidhi was seen today for fatigue, dizziness and nausea.    Diagnoses and all orders for this visit:    Nonspecific syndrome suggestive of viral illness  -Patient appears nontoxic, afebrile, with stable vitals. No neck stiffness or rigidity noted on exam. Reassured patient she does not present with any red flag symptoms concerning for a life-threatening illness. She feels like she is overall getting better, just feels intermittently nauseous, dizzy, and fatigued. Recently had labs completed 6/22- overall unremarkable. Discussed a watch and wait approach for another 1-2 weeks.     RTC if worsening or new symptoms.       Tracy Ryan, NP   ______________________________________    Subjective   Mindy is a 42 year old patient here today for Fatigue, Dizziness, and Nausea (Loss of appetite )    Started June 24th  Felt fever, chills, slept 11 hours  Since then sleeping 9 hours a night and naps. Not normal for her  Nausea, loss of appetite-  Occurred 3-4x in the last 2 weeks. No vomiting  Dizziness feels like it takes a second for her eyes to catch up  Hurts right behind her eyes  Been 1 week since fever, taking ibuprofen and tylenol once per day for aches (HA, neck pain)  \"pretty mild neck pain/stiffness\" that comes and goes  Tested negative for COVID-19 several times  Overall feels like she is getting better  Feels like some of these symptoms may be related to stress (current events, frequent work travel, family)    Do you need any refills on your Medications today? No    Medical, surgical, family and social histories reviewed and updated as indicated.   Medications and allergies reviewed and updated as indicated.       ROS  Review Of Systems  Skin: negative  Ears/Nose/Throat: No congestion, rhinorrhea, hearing changes, or changes in vision  Respiratory: No shortness of breath, dyspnea on exertion, cough, or hemoptysis  Cardiovascular: negative  Gastrointestinal: see HPI  Genitourinary: " "negative  Musculoskeletal: negative  Neurologic: negative    General Physical Exam:  Vitals: /73   Pulse 91   Temp 98.5  F (36.9  C) (Oral)   Ht 1.702 m (5' 7\")   Wt 80.7 kg (178 lb)   LMP 07/01/2022   SpO2 96%   BMI 27.88 kg/m    Physical Exam  Vitals and nursing note reviewed.   Constitutional:       General: She is not in acute distress.     Appearance: Normal appearance. She is not ill-appearing.   HENT:      Head: Normocephalic and atraumatic.      Right Ear: Tympanic membrane, ear canal and external ear normal.      Left Ear: Tympanic membrane, ear canal and external ear normal.      Nose: Nose normal. No congestion or rhinorrhea.      Mouth/Throat:      Mouth: Mucous membranes are moist.      Pharynx: No oropharyngeal exudate or posterior oropharyngeal erythema.   Eyes:      Extraocular Movements: Extraocular movements intact.      Conjunctiva/sclera: Conjunctivae normal.      Pupils: Pupils are equal, round, and reactive to light.   Cardiovascular:      Rate and Rhythm: Normal rate and regular rhythm.      Pulses: Normal pulses.      Heart sounds: Normal heart sounds.   Pulmonary:      Effort: Pulmonary effort is normal. No accessory muscle usage or respiratory distress.      Breath sounds: Normal breath sounds. No stridor. No wheezing, rhonchi or rales.   Chest:      Chest wall: No tenderness.   Abdominal:      General: Abdomen is flat. Bowel sounds are normal.      Palpations: Abdomen is soft.   Musculoskeletal:      Cervical back: Normal range of motion and neck supple.   Lymphadenopathy:      Cervical: No cervical adenopathy.   Skin:     General: Skin is warm and dry.      Capillary Refill: Capillary refill takes less than 2 seconds.      Coloration: Skin is not cyanotic.      Nails: There is no clubbing.   Neurological:      General: No focal deficit present.      Mental Status: She is alert and oriented to person, place, and time.      Gait: Gait is intact.   Psychiatric:         Mood and " Affect: Mood normal.         Behavior: Behavior normal.         Thought Content: Thought content normal.         Judgment: Judgment normal.

## 2022-07-19 ENCOUNTER — VIRTUAL VISIT (OUTPATIENT)
Dept: PSYCHOLOGY | Facility: CLINIC | Age: 42
End: 2022-07-19
Payer: COMMERCIAL

## 2022-07-19 DIAGNOSIS — F41.9 ANXIETY: Primary | ICD-10-CM

## 2022-07-19 DIAGNOSIS — Z13.39 ATTENTION DEFICIT HYPERACTIVITY DISORDER (ADHD) EVALUATION: ICD-10-CM

## 2022-07-19 PROCEDURE — 90791 PSYCH DIAGNOSTIC EVALUATION: CPT | Mod: 95 | Performed by: PSYCHOLOGIST

## 2022-07-19 NOTE — PROGRESS NOTES
[facility]  Provider Name:  Dr. Bibiana Cochran     Credentials:  Ike BRUNSON    PATIENT'S NAME: Vidhi Tovar  PREFERRED NAME: Mindy  PRONOUNS:She/her  MRN: [id number]  : 1980  ADDRESS: [address] [place] [id number]  ACCT. NUMBER:  [id number]  DATE OF SERVICE: 22  START TIME: 1205  END TIME: 1300  PREFERRED PHONE: [phone] May we leave a program related message: Yes  SERVICE MODALITY:  Video Visit:      Provider verified identity through the following two step process.  Patient provided:  Patient     Telemedicine Visit: The patient's condition can be safely assessed and treated via synchronous audio and visual telemedicine encounter.      Reason for Telemedicine Visit: Services only offered telehealth    Originating Site (Patient Location): Patient's home    Distant Site (Provider Location): Provider Remote Setting- Home Office    Consent:  The patient/guardian has verbally consented to: the potential risks and benefits of telemedicine (video visit) versus in person care; bill my insurance or make self-payment for services provided; and responsibility for payment of non-covered services.     Patient would like the video invitation sent by:  My Chart    Mode of Communication:  Video Conference via Amwell    As the provider I attest to compliance with applicable laws and regulations related to telemedicine.    UNIVERSAL ADULT Mental Health DIAGNOSTIC ASSESSMENT    Identifying Information:  Ms. Tovar is a 42-year-old, woman; she spoke English, female pronouns were used, and she identified no cultural considerations for treatment. The clinical interviews took place via telemedicine due to the Corona Virus outbreak. This writer gathered her background information at the time of each session.     Client's Statement of Presenting Concern:  Ms. Tovar was referred for an Attention Deficit Hyperactivity Disorder Evaluation by MELA Langford CNP on  06/13/2022 due to poor concentration and inattention. The purpose of the evaluation was to provide an opinion regarding possible mental health issues or deficits and treatment recommendations.     History of Presenting Concern:  Ms. Tovar indicated she has been engaged in individual therapy for the past 2 years. She stated she has been prescribed Xanax to manage anxiety symptoms; diagnostic assessment has not been completed. She stated that her therapist recommended an evaluation to assess  baseline  functioning and she expressed concern regarding  impulse control.  She questioned if ADHD is present because she  zones out  and has difficulty completing tasks. She highlighted she was given academic accommodations as a youth without a formal diagnosis. She acknowledged that symptoms impair her functioning at home and work as well as in social relationships.     Background Information:  Developmental History  Ms. Tovar was the younger of two children born to her parents. She was raised in [place], [place] along with her older brother. She acknowledged that her parents  marriage was unhealthy, and her mother told her she was  dissatisfied  with the relationship; she denied witnessing incidents of domestic violence. She disclosed that her father was emotionally and physically distant. She expressed the belief that her father  didn t really want kids.  She described her relationship with her father as  disappointing  because he was  absent  and  worked all the time.  She highlighted that when she was 12 years old, she told him she wanted  more  from their relationship, but his behavior did not change. She stated that her relationship with her mother was  generally good  yet there were no boundaries between them which was  inappropriate.  She acknowledged having to provider her mother s social-emotional support. She indicated that her relationship with her brother was  up and down  during their youth. She  highlighted that her brother began abusing substances as a youth and lost a best friend during his teens. She reported that her brother s struggles required a lot of attention, yet he was not provided the right supports/treatment.     Significant Relationships and Supports    Intimate Relationships  Ms. Tovar indicated she is currently single and not actively dating because she is  working all the time  and does not  have bandwidth for it.  She stated that her last romantic relationship ended in January of 2022 after a month of dating. She indicated that her longest romantic relationship lasted 6 years. She recalled meeting her former partner while living in the [facility]. She acknowledged that her partner was emotionally distant. She expressed the belief that the relationship could work because he was very supportive of her career but there was not enough of a connection. She denied incidents of domestic violence. She outlined other romantic relationships with men but questioned if she has ever been in love.     Relationships with Family Members  Ms. Tovar stated that her parents  marriage continue to be  tinged with tension.  She highlighted she has  quite a bit  of contact with her parents because they reside in the same complex. She reported she moved into the building a year and a half ago. She indicated that her father expressed feeling comforted that she is in the building, yet they do not have a close relationship emotionally. She acknowledged that her relationship with her mother has become  worse  and more  complicated  over time. She recognized the enmeshment. Ms. Tovar asserted her brother currently resides in the [facility] and continues to struggle with substance use. She highlighted that most of their contact revolves around his daughter.     Relationships with Peers  Ms. Tovar reported that when she moved back to [place] in June of 2021, she reconnected with a childhood  friend who introduced her to other individuals. She acknowledged   drinking a ton  with these individuals. She asserted that her interactions with them have decreased since she stopped drinking. She identified another childhood friend who she had an off and on relationship with and they have recently reconnected. She admitted she has  taken breaks  from other friends.    Educational & Occupational History  Ms. Tovar attended the [employer] beginning in  through her senior year; she graduated a semester early in the year 1998. She recalled that her  expressed concern to her mother that her learning was not meeting her estimated intelligence. She asserted she received academic accommodations despite not having a formal diagnosis. She acknowledged that her parents did not follow through with a recommendation for testing. She stated that teachers gave examinations orally or had her take the tests alone in a quiet room. She indicated she earned  medium grades.  She highlighted she excelled in humanities and anatomy courses. She stated she had difficulty with mathematics because the concepts did not  permeate.  She reported she got along with most teachers  fantastically  and maintains a relationship with her high school counselor. She acknowledged she was reprimanded  always for talking too much.  She denied trouble making or keeping friends.     Following high school, Ms. Tovar enrolled at the [employer]. She stated that her transition was  okay  because she  made friends.  She highlighted she was  ostracized  by a group of men after four of them expressed romantic interest in her, but she interested in the fifth. She reported that when the relationships ended due to the romantic feelings amongst everyone. She recalled earning a 2.8 grade point average; a lower grade point as compared to high school. She indicated that for her sophomore year, she transferred to [employer] in [place], [place] where her mother attended college. She stated that the experience was  fine.  She highlighted she lived with a group of transfer students who were not age wise her peers. She added she played basketball and established a romantic relationship. She could not recall what grades she earned yet highlighted she was not placed on probation. She indicated she studied abroad in the country of [place] in the year 2001. She stated she majored in sociology and graduated in the year 2002.     Ms. Tovar stated she returned to [place] after college and worked in the service industry for 2 years. 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 She asserted she only spoke to coworkers about pooling tips. She indicated she unemployed for 4 months and then took a job as a  for political fundraising. She reported that a coworker introduced her to the [employer] campaign to manage  advance events.  She stated that when the election cycle ended, she relocated to [place] with a partner/coworker. 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 She reported she then took a job as an . She acknowledged she left the position on  bad terms   but could not remember the events which led her to leave. She reported returned to the advertising agency where she worked as an . She highlighted that she began working  advance  again for the [employer] campaign in the year 2005. 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 She reported she has a  good base of clients  yet described it as an   insane  schedule.     Assessments:  PHQ9: No flowsheet data found.  GAD7:   DIONNE-7 SCORE 6/22/2022 6/22/2022   Total Score - 5 (mild anxiety)   Total Score 5 5     CAGE-AID: No flowsheet data found.  PROMIS 10-Global Health (all questions and answers displayed):   PROMIS 10 6/28/2022 7/19/2022   In general, would you say your health is: Good Good   In general, would you say your quality of life is: Very good Very good   In general, how would you rate your physical health? Good Good   In general, how would you rate your mental health, including your mood and your ability to think? Fair Fair   In general, how would you rate your satisfaction with your social activities and relationships? Good Good   In general, please rate how well you carry out your usual social activities and roles Fair Good   To what extent are you able to carry out your everyday physical activities such as walking, climbing stairs, carrying groceries, or moving a chair? Completely Completely   How often have you been bothered by emotional problems such as feeling anxious, depressed or irritable? Often Often   How would you rate your fatigue on average? Moderate Moderate   How would you rate your pain on average?   0 = No Pain  to  10 = Worst Imaginable Pain 3 0   In general, would you say your health is: 3 3   In general, would you say your quality of life is: 4 4   In general, how would you rate your physical health? 3 3   In general, how would you rate your mental health, including your mood and your ability to think? 2 2   In general, how would you rate your satisfaction with your social activities and relationships? 3 3   In general, please rate how well you carry out your usual social activities and roles. (This includes activities at home, at work and in your community, and responsibilities as a parent, child, spouse, employee, friend, etc.) 2 3   To what extent are you able to carry out your everyday physical activities such as walking,  climbing stairs, carrying groceries, or moving a chair? 5 5   In the past 7 days, how often have you been bothered by emotional problems such as feeling anxious, depressed, or irritable? 4 4   In the past 7 days, how would you rate your fatigue on average? 3 3   In the past 7 days, how would you rate your pain on average, where 0 means no pain, and 10 means worst imaginable pain? 3 0   Global Mental Health Score 11 11   Global Physical Health Score 15 16   PROMIS TOTAL - SUBSCORES 26 27     Mental Health History:  Ms. Tovar asserted that mental health symptoms emerged during her youth. She recalled crying spells and  constantly feeling like no one liked [her].  She acknowledged symptoms social anxiety yet denied excessive generalized worries. She stated that her mother and partner tried to support her, but she could not be consoled. She reported that mood symptoms  ebbed and flowed  throughout the years. She highlighted she can  perform  and  get through  despite symptoms being present. She indicated that mood symptoms worsened after megan COVID-19 with increased fatigue and decreased motivation.     Patient's Strengths and Limitations  Patient identified the following strengths or resources that will help them succeed in treatment: motivation. Things that may interfere with their success in treatment include mental health symptoms.     Health/Medical History:  Ms. Tovar asserted that her physical health is  generally good,  but in the year 2022, she was diagnosed with COVID-19 and tonsilitis. She expressed feeling like she is getting ill  very often  this year but this has not been the case in the past. She outlined continued symptoms of COVID-19 including decreased mental acuity, fatigue, and lack of smell/ taste. She reported that 7 years ago, she got an IUD. She stated she does not  bleed a lot  but has  a lot of mood swings  and  pain  around menstruation. She acknowledged she is overweight. She  described her diet as  toddler like  as she often consumes peanut butter and jelly sandwiches. She admitted she forgets to eat when working. She highlighted she has  mostly cut out caffeine because it makes [her] jittery.  She stated she does not have a regular exercise routine.     Patient does report a family history of mental health concerns.  Patient reports family history includes Cancer in her maternal grandfather and paternal grandmother; Depression in her brother; Diabetes in her maternal grandmother; Hypertension in her father; Substance Abuse in her maternal grandmother and paternal grandfather..     Patient reports current meds as:   Outpatient Medications Marked as Taking for the 7/19/22 encounter (Appointment) with Bibiana Cochran, PhD LP   Medication Sig     ALPRAZolam (XANAX) 0.25 MG tablet Take 1 tablet (0.25 mg) by mouth 3 times daily as needed for anxiety     scopolamine (TRANSDERM-SCOP, 1.5 MG,) 1 MG/3DAYS 72 hr patch Place 1 patch onto the skin every 72 hours Apply to hairless area behind one ear at least 4 hours before travel.     Medication Adherence:  Patient reports taking prescribed medications as prescribed.    Patient Allergies:  No Known Allergies    Medical History:    Past Medical History:   Diagnosis Date     Fear of flying      Herpes simplex virus infection      Rosacea      Vitamin D deficiency      Vitiligo      Current Mental Status Exam:   Appearance:  Appropriate    Eye Contact:  Good   Psychomotor:  Normal  Restless       Gait / station:  Not assessed  Attitude / Demeanor: Cooperative   Speech      Rate / Production: Normal/ Responsive      Volume:  Normal  volume      Language:  intact  Mood:   Anxious   Affect:   Appropriate  Tearful   Thought Content: Clear   Thought Process: Coherent  Goal Directed       Associations: No loosening of associations  Insight:   Good  and Fair   Judgment:  Intact   Orientation:  All  Attention/concentration: Good    Substance Use  History:  Ms. Tovar reported she consumed alcohol and smoked cannabis for the first time at the age of 13 years old. She indicated that at the age of 16 years old, she was drinking alcohol  regularly  with peers. She stated that in the year 1996, she tested positive for TB which required treatment, so she did not drink for 300 days. She highlighted that her mother expressed the belief that TB kept her from becoming an alcoholic. She acknowledged she returned to drinking after treatment. She asserted that at in [place], her alcohol use increased. She reported that when she transferred to [place] her alcohol use decreased because she did not have as many friends. 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  She stated that throughout the years she has  taken a few months off  from drinking. She highlighted that on 05/08/2022, she stopped drinking alcohol but two weeks ago, she drank 5 days in a row. She recognized she was  medicating something  with alcohol. She disclosed that throughout the years, her cannabis had  tapered off  because she feels  hungover  when smoking. She asserted she may ingest half of a cannabis gummy. She acknowledged  getting high at school  as a youth and daily use. She disclosed she abused Adderall and opium in college. She also abused cocaine from the age of 26 to 34 years old. She reported that her use in her mid-twenties occurred  every workday.      Trauma History:  Ms. Tovar denied ever witnessing or experiencing a domestic violence, childhood abuse, a life-threatening event or sexual assault. However, she outlined to sexual/romantic relationships with men who were significantly older than her and in an authority role.     Risk Taking Behaviors:  Ms. Tovar reported a history of the following risk-taking behaviors: excessive substance use, risky sexual behaviors, impulsive decision making, and spending.     Motor  Vehicle Operation:  Ms. Tovar stated she was issued her 's license at the age of 16 years old. She reported she has been cited for speeding on two occasions. She asserted she is  addicted to [employer] [maps]  because she easily gets lost or misses turns. She acknowledged she experiences road rage. She expressed the belief that others feel safe riding in the car care while she is driving.     Safety Assessment:   Patient denies current homicidal ideation and behaviors.  Patient denies current self-injurious ideation and behaviors.    Patient denied risk behaviors associated with substance use.  Patient denies any high risk behaviors associated with mental health symptoms.  Patient reports the following current concerns for their personal safety: None.  Patient reports there are not firearms in the house.      History of Safety Concerns:  Patient denied a history of homicidal ideation.     Patient denied a history of personal safety concerns.    Patient denied a history of assaultive behaviors.    Patient denied a history of sexual assault behaviors.     Patient denied a history of risk behaviors associated with substance use.  Patient denies any history of high risk behaviors associated with mental health symptoms.  Patient reports the following protective factors: other    Risk Plan:  See Recommendations for Safety and Risk Management Plan    Review of Symptoms per patient report:  Depression: Lack of interest, Change in energy level, Change in appetite, Psychomotor slowing or agitation, Feelings of hopelessness, Feelings of helplessness, Irritability, Feeling sad, down, or depressed and Withdrawn  Mera:  No Symptoms  Psychosis: No Symptoms  Anxiety: Social anxiety, Sleep disturbance, Poor concentration, Irritability and Anger outbursts  Panic:  Palpitations, Sense of impending doom and Vertigo (2x/wk)  Post Traumatic Stress Disorder:  No Symptoms   Eating Disorder: No Symptoms  ADD / ADHD:  Inattentive,  Difficulties listening, Poor task completion, Poor organizational skills, Distractibility, Forgetful, Interrupts, Intrudes, Impulsive and Restlessness/fidgety  Conduct Disorder: No symptoms  Autism Spectrum Disorder: No symptoms  Obsessive Compulsive Disorder: No Symptoms    Patient reports the following compulsive behaviors and treatment history: Picking - has not had treatment., Hair Pulling - has not had treatment., Shopping / Spending - has not had treatment. and Social Media - has not had treatment..      Diagnostic Criteria:   Anxiety  Rule out ADHD    Functional Status:  Patient reports the following functional impairments:  academic performance, educational activities, health maintenance, management of the household and or completion of tasks, money management, organization, relationship(s), self-care, social interactions and work / vocational responsibilities.     Nonprogrammatic care:  Patient is requesting basic services to address current mental health concerns.    Clinical Summary:  1. Reason for assessment: ADHD Eval  .  2. Psychosocial, Cultural and Contextual Factors: Hx of unhealthy interpersonal relationships  .  3. Principal DSM5 Diagnoses  (Sustained by DSM5 Criteria Listed Above):   Anxiety  4. Other Diagnoses that is relevant to services: MDD  5. Provisional Diagnosis:Rule out ADHD   6. Prognosis: Relieve Acute Symptoms and Maintain Current Status / Prevent Deterioration.  7. Likely consequences of symptoms if not treated: chronic.  8. Client strengths include:  has a previous history of therapy, motivated, open to suggestions / feedback and support of family, friends and providers .     Recommendations:   1. Plan for Safety and Risk Management:   Recommended that patient call 911 or go to the local ED should there be a change in any of these risk factors.     Report to child / adult protection services was NA.     2. Patient's identified no cultural considerations.     3. Initial Treatment  will focus on:    ADHD Testing:  Patient was given self and collaborative rating scales to be completed prior to the next appointment. Depression and anxiety rating scales were completed.  Copies of report cards were not requested.      4. Resources/Service Plan:    services are not indicated.   Modifications to assist communication are not indicated.   Additional disability accommodations are not indicated.      5. Collaboration:   Collaboration / coordination of treatment will be initiated as needed with the following support professionals: primary care physician.      6.  Referrals:   The following referral(s) will be initiated as desired by client: Outpatient Mental Health Therapy.      A Release of Information has been obtained for the following: None.    7. ROBERT:    Discussed the general effects of drugs and alcohol on health and well-being. Provider gave patient printed information about the effects of chemical use on their health and wellbeing. Recommendations:  Use responsibly .     8. Records:   These were reviewed at time of assessment.   Information in this assessment was obtained from the medical record and  provided by patient who is a good historian. Patient will have open access to their mental health medical record.           Provider Name/ Credentials:  Dr Cochran July 19, 2022

## 2022-08-22 ENCOUNTER — TELEPHONE (OUTPATIENT)
Dept: PSYCHOLOGY | Facility: CLINIC | Age: 42
End: 2022-08-22

## 2022-09-09 ENCOUNTER — FCC EXTENDED DOCUMENTATION (OUTPATIENT)
Dept: PSYCHOLOGY | Facility: CLINIC | Age: 42
End: 2022-09-09

## 2022-09-09 ENCOUNTER — VIRTUAL VISIT (OUTPATIENT)
Dept: PSYCHOLOGY | Facility: CLINIC | Age: 42
End: 2022-09-09
Payer: COMMERCIAL

## 2022-09-09 DIAGNOSIS — F41.9 ANXIETY: ICD-10-CM

## 2022-09-09 DIAGNOSIS — Z13.39 ATTENTION DEFICIT HYPERACTIVITY DISORDER (ADHD) EVALUATION: ICD-10-CM

## 2022-09-09 DIAGNOSIS — F33.0 MILD EPISODE OF RECURRENT MAJOR DEPRESSIVE DISORDER (H): Primary | ICD-10-CM

## 2022-09-09 PROCEDURE — 96130 PSYCL TST EVAL PHYS/QHP 1ST: CPT | Mod: 95 | Performed by: PSYCHOLOGIST

## 2022-09-09 PROCEDURE — 96131 PSYCL TST EVAL PHYS/QHP EA: CPT | Mod: 95 | Performed by: PSYCHOLOGIST

## 2022-09-09 NOTE — CONFIDENTIAL NOTE
[facility]  Attention Deficit Hyperactivity Disorder Evaluation    Name: Vidhi Tovar   : 1980    Examined By: Bibiana Cochran PsyD, LP     Sources of Information & Assessment Measures:    Patient Health Questionnaire 9- Item Scale, Completed Unknown Date    [facility] [place] [facility], Medical Chart, Reviewed 2022    CNS Vital Signs, Completed 2022    [place] Multiphasic Personality Inventory-2nd Edition, Administered 2022    BFIS-LF: Self-Report, Dated 2022    BDEFS-LF: Self-Report, Dated 2022    BAARS-IV: Self-Report: Current Symptoms, Dated 2022    BAARS-IV: Self-Report: Childhood Symptoms, Dated 2022    Clinical Interviews, Conducted 2022, 2022 & 2022    BAARS-IV: Other-Report: Childhood Symptoms, Completed by Heaven Jaffe & Teddy La (Parents), Dated 2022    Generalized Anxiety Disorder 7-Item Scale, Completed 2022 & 2022    World Health Organization Disability Assessment Schedule 2.0, Completed 2022    PROMIS Global-10, Completed on 2022    Identifying Information:  Ms. Tovar is a 42-year-old, woman; she spoke English, female pronouns were used, and she identified no cultural considerations for treatment. The clinical interviews took place via telemedicine due to the Corona Virus outbreak. This writer gathered her background information at the time of each session.     Client's Statement of Presenting Concern:  Ms. Tovar was referred for an Attention Deficit Hyperactivity Disorder Evaluation by MELA Langford CNP on 2022 due to poor concentration and inattention. The purpose of the evaluation was to provide an opinion regarding possible mental health issues or deficits and treatment recommendations.     History of Presenting Concern:  Ms. Tovar indicated she has been engaged in individual therapy for the past 2 years. She stated she has been prescribed Xanax to manage anxiety  symptoms; diagnostic assessment has not been completed. She stated that her therapist recommended an evaluation to assess  baseline  functioning and she expressed concern regarding  impulse control.  She questioned if ADHD is present because she  zones out  and has difficulty completing tasks. She highlighted she was given academic accommodations as a youth without a formal diagnosis. She acknowledged that symptoms impair her functioning at home and work as well as in social relationships.     Background Information:  Developmental History  Ms. Tovar was the younger of two children born to her parents. She was raised in [place], [place] along with her older brother. She acknowledged that her parents  marriage was unhealthy, and her mother told her she was  dissatisfied  with the relationship; she denied witnessing incidents of domestic violence. She disclosed that her father was emotionally and physically distant. She expressed the belief that her father  didn t really want kids.  She described her relationship with her father as  disappointing  because he was  absent  and  worked all the time.  She highlighted that when she was 12 years old, she told him she wanted  more  from their relationship, but his behavior did not change. She stated that her relationship with her mother was  generally good  yet there were no boundaries between them which was  inappropriate.  She acknowledged having to provide her mother with social-emotional support. She indicated that her relationship with her brother was  up and down  during their youth. She highlighted that her brother began abusing substances as a teen and lost a best friend during that time. She reported that her brother s struggles required a lot of attention, yet he was not provided the right support or treatment.     Significant Relationships and Supports    Intimate Relationships  Ms. Tovar indicated she is currently single and not actively dating  because she is  working all the time  and does not  have the bandwidth for it.  She stated that her last romantic relationship ended in January of 2022 after a month of dating. She indicated that her longest romantic relationship lasted 6 years. She recalled meeting her former partner while living in the [facility]. She acknowledged that her partner was emotionally distant. She expressed the belief that the relationship could have worked because he was very supportive of her career but there was not enough of a connection. She denied incidents of domestic violence. She outlined other romantic relationships with men but questioned if she has ever been in love.     Relationships with Family Members  Ms. Tovar stated that her parents  marriage continues to be  tinged with tension.  She highlighted she has  quite a bit  of contact with her parents because they reside in the same complex. She reported she moved into the building a year and a half ago. She indicated that her father expressed feeling comforted that she is in the building, yet they do not have a close relationship emotionally. She acknowledged that her relationship with her mother has become  worse  and more  complicated  over time. She recognized the enmeshment. Ms. Tovar asserted her brother currently resides in the [facility] and continues to struggle with substance use. She highlighted that most of their contact revolves around his daughter.     Relationships with Peers  Ms. Tovar reported that when she moved back to [place] in June of 2021, she reconnected with a childhood friend who introduced her to other individuals. She acknowledged  drinking a ton  with these individuals. She asserted that her interactions with them have decreased since she stopped drinking. She identified another childhood friend who she had an off and on relationship with and they have recently reconnected. She admitted she has  taken breaks  from  other friends.    Educational & Occupational History  Ms. Tovar attended the [employer] beginning in  through her senior year; she graduated a semester early in the year 1998. She recalled that her  expressed concern to her mother that her learning was not meeting her estimated intelligence. She asserted she received academic accommodations despite not having a formal diagnosis. She acknowledged that her parents did not follow through with a recommendation for testing. She stated that teachers gave examinations orally or had her take the tests alone in a quiet room. She indicated she earned  medium grades.  She highlighted she excelled in humanities and anatomy courses. She stated she had difficulty with mathematics because the concepts did not  permeate.  She reported she got along with most teachers  fantastically  and maintains a relationship with her high school counselor. She acknowledged she was reprimanded  always for talking too much.  She denied trouble making or keeping friends.     Following high school, Ms. Tovar enrolled at the [employer]. She stated that her transition was  okay  because she  made friends.  She highlighted she was  ostracized  by a group of men after four of them expressed romantic interest in her, but she was interested in a fifth. She reported that the friendships ended due to the romantic feelings amongst everyone. She recalled earning a 2.8 grade point average; a lower grade point as compared to high school. She indicated that for her sophomore year, she transferred to [employer] in [place], [place] where her mother attended school. She stated that the experience was  fine.  She highlighted she lived with a group of transfer students who were not age wise her peers. She added she played basketball and established a romantic relationship. She could not recall what grades she earned yet highlighted she was not placed on probation.  She indicated she studied abroad in the country of [place] in the year 2001. She stated she majored in sociology and graduated in the year 2002.     Ms. Tovar stated she returned to [place] after college and worked in the service industry for 2 years. 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 She asserted she only spoke to coworkers about pooling tips. She indicated she was unemployed for 4 months and then took a job as a  for political fundraising. She reported that a coworker introduced her to the [employer] to manage  advance events.  She stated that when the election cycle ended, she relocated to [place] with a partner/coworker. 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 She reported she then took a job as an . She acknowledged she left the position on  bad terms  but could not remember the events which led her to leave. She reported returned to the [email or web address] agency where she worked as an . She highlighted that she began working  advance  again for the [employer] campaign in the year 2005. 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 She reported she has a  good base of clients  yet described it as an  insane  schedule.     Mental Health History:  Ms. Tovar asserted that mental health symptoms emerged during her youth. She recalled crying spells and  constantly feeling like no one liked [her].  She acknowledged symptoms of social anxiety yet denied excessive generalized worries. She stated that her mother and partner tried to support her, but she could not  be consoled. She reported that mood symptoms  ebbed and flowed  throughout the years. She highlighted she can  perform  and  get through  despite symptoms being present. She indicated that mood symptoms worsened after megan COVID-19 with increased fatigue and decreased motivation.     Patient's Strengths and Limitations  Patient identified the following strengths or resources that will help them succeed in treatment: motivation. Things that may interfere with their success in treatment include mental health symptoms.     Health/Medical History:  Ms. Tovar asserted that her physical health is  generally good,  but in the year 2022, she was diagnosed with COVID-19 and tonsilitis. She expressed feeling like she is getting ill  very often  this year but this has not been the case in the past. She outlined continued symptoms of COVID-19 including decreased mental acuity, fatigue, and lack of smell/ taste. She reported that 7 years ago, she got an IUD. She stated she does not  bleed a lot  but has  a lot of mood swings  and  pain  around menstruation. She acknowledged she is overweight. She described her diet as  toddler like  as she often consumes peanut butter and jelly sandwiches. She admitted she forgets to eat when working. She highlighted she has  mostly cut out caffeine because it makes [her] jittery.  She stated she does not have a regular exercise routine.     Patient does report a family history of mental health concerns. Patient reports family history includes Cancer in her maternal grandfather and paternal grandmother; Depression in her brother; Diabetes in her maternal grandmother; Hypertension in her father; Substance Abuse in her maternal grandmother and paternal grandfather.      Patient reports current meds as       Medication Sig     ALPRAZolam (XANAX) 0.25 MG tablet Take 1 tablet (0.25 mg) by mouth 3 times daily as needed for anxiety     scopolamine (TRANSDERM-SCOP, 1.5 MG,) 1 MG/3DAYS 72 hr  patch Place 1 patch onto the skin every 72 hours Apply to hairless area behind one ear at least 4 hours before travel.      Medication Adherence  Patient reports taking prescribed medications as prescribed.     Patient Allergies    No Known Allergies          Past Medical History   Diagnosis Date     Fear of flying       Herpes simplex virus infection       Rosacea       Vitamin D deficiency       Vitiligo           Current Mental Status Exam:   Appearance:                 Appropriate    Eye Contact:                 Good   Psychomotor:               Normal, Restless       Gait / station:            Not assessed  Attitude / Demeanor:    Cooperative   Speech      Rate / Production:    Normal, Responsive      Volume:                    Normal        Language:                 Intact  Mood:                           Anxious   Affect:                           Appropriate, Tearful   Thought Content:         Clear   Thought Process:          Coherent, Goal Directed       Associations:            No loosening of associations  Insight:                          Good, Fair   Judgment:                     Intact   Orientation:                  All  Attention/concentration:         Good    Substance Use History:  Ms. Tovar reported she consumed alcohol and smoked cannabis for the first time at the age of 13 years old. She indicated that at the age of 16 years old, she was drinking alcohol  regularly  with peers. 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 She highlighted that her mother expressed the belief that the illness kept her from becoming an alcoholic. She acknowledged she returned to drinking after treatment. She asserted that while residing in [place], her alcohol use increased. She reported that when she transferred to [place] her alcohol use decreased because she did not have as many friends. 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  She stated that throughout the years she has  taken a few months off  from drinking. She highlighted that on 05/08/2022, she stopped drinking alcohol but two weeks ago, she drank 5 days in a row. She recognized she was  medicating something  with alcohol. She disclosed that throughout the years, her cannabis use has  tapered off  because she feels  hungover  when smoking. She asserted she may ingest half of a cannabis gummy. She acknowledged  getting high at school  as a youth and daily use. She disclosed she abused Adderall and opium in college. She also abused cocaine from the age of 26 to 34 years old. She reported that her use in her mid-twenties occurred  every workday.      Trauma History:  Ms. Tovar denied ever witnessing or experiencing a domestic violence, childhood abuse, a life-threatening event or sexual assault. However, she outlined to sexual/romantic relationships with men who were significantly older than her and in an authority role.     Risk Taking Behaviors:  Ms. Tovar reported a history of the following risk-taking behaviors: excessive substance use, risky sexual behaviors, impulsive decision making, and spending.     Motor Vehicle Operation:  Ms. Tovar stated she was issued her 's license at the age of 16 years old. She reported she has been cited for speeding on two occasions. She asserted she is  addicted to [employer] [maps]  because she easily gets lost or misses turns. She acknowledged she experiences road rage. She expressed the belief that others feel safe riding in the car care while she is driving.     Safety Assessment:   Current Safety Concerns  Patient denies current homicidal ideation and behaviors.  Patient denies current self-injurious ideation and behaviors.    Patient denied risk behaviors associated with substance use.  Patient denies any high-risk behaviors associated with mental  health symptoms.  Patient reports the following current concerns for their personal safety: None.  Patient reports there are not firearms in the house.       History of Safety Concerns  Patient denied a history of homicidal ideation.     Patient denied a history of personal safety concerns.    Patient denied a history of assaultive behaviors.    Patient denied a history of sexual assault behaviors.     Patient denied a history of risk behaviors associated with substance use.  Patient denies any history of high-risk behaviors associated with mental health symptoms.    Patient reports the following protective factors: other     Risk Plan:  See Recommendations for Safety and Risk Management Plan     Review of Symptoms per patient report  Depression:     Lack of interest, Change in energy level, Change in appetite, Psychomotor slowing or agitation, Feelings of hopelessness, Feelings of helplessness, Irritability, Feeling sad, down, or depressed and Withdrawn  Mera:             No Symptoms  Psychosis:       No Symptoms  Anxiety:          Social anxiety, Sleep disturbance, Poor concentration, Irritability and Anger outbursts  Panic:              Palpitations, Sense of impending doom and Vertigo (2x/wk)  Post-Traumatic Stress Disorder:  No Symptoms   Eating Disorder:          No Symptoms  ADHD:           Inattentive, Difficulties listening, Poor task completion, Poor organizational skills, Distractibility, Forgetful, Interrupts, Intrudes, Impulsive and Restlessness/fidgety  Conduct Disorder:       No symptoms  Autism Spectrum Disorder:     No symptoms  Obsessive Compulsive Disorder:       No Symptoms     Patient reports the following compulsive behaviors and treatment history: Skin Picking, Hair Pulling, Shopping / Spending, and Social Media.      Functional Status:  Ms. Tovar reported that symptoms have caused reduced functional status in the following areas: academic performance, educational activities, health  maintenance, management of the household and or completion of tasks, money management, organization, relationship(s), self-care, social interactions and work / vocational responsibilities.    Recommendations:   1. Plan for Safety and Risk Management:   Recommended that patient call 911 or go to the local ED should there be a change in any of these risk factors.     Report to child / adult protection services was NA.     2. Patient's identified no cultural considerations.     3. Initial Treatment will focus on:    ADHD Testing:  Patient was given self and collaborative rating scales to be completed prior to the next appointment. Depression and anxiety rating scales were completed.  Copies of report cards were not requested.      4. Resources/Service Plan:    services are not indicated.   Modifications to assist communication are not indicated.   Additional disability accommodations are not indicated.      5. Collaboration:   Collaboration / coordination of treatment will be initiated as needed with the following support professionals: primary care physician.      6.  Referrals:   The following referral(s) will be initiated as desired by client: Outpatient Mental Health Therapy.      A Release of Information has been obtained for the following: None.    7. ROBERT:    Discussed the general effects of drugs and alcohol on health and well-being. Provider gave patient printed information about the effects of chemical use on their health and wellbeing. Recommendations:  Use responsibly.     8. Records:   These were reviewed at time of assessment.   Information in this assessment was obtained from the medical record and  provided by patient who is a good historian. Patient will have open access to their mental health medical record.     Assessment Measures:  World Health Organization Disability Assessment Schedule 2.0   The WHODAS 2.0 short version is a 12-item measure that screens disability in adults age 18 years  and older. Each self-administered item asks the individual to rate how much difficulty he or she has had in specific areas of functioning during the past 30 days.    In the past 30 days, how much difficulty did you have in:    S1 Standing for long periods such as 30 minutes None   S2 Taking care of your household responsibilities Moderate   S3 Learning a new task, for example, learning how to get to a new place? Mild     S4 Joining in community activities (for example, festivities, Christian or other activities) in the same way as anyone else can? Mild   S5 How much have you been emotionally affected by your health problems? Mild   S6 Concentrating on doing something for ten minutes? Moderate   S7 Walking a long distance such as a kilometre [or equivalent]? None   S8 Washing your whole body? Mild   S9 Getting dressed? Mild   S10 Dealing with people you do not know? Moderate   S11 Maintaining a friendship? Mild   S12 Your day-to-day work? Moderate   H1 Overall, in the past 30 days, how many days were these difficulties present? 20   H2 In the past 30 days, for how many days were you totally unable to carry out your usual activities or work because of any health condition? 5   H3 In the past 30 days, not counting the days that you were totally unable, for how many days did you cut back or reduce your usual activities or work because of any health condition? 5   WHODAS 2.0 12 Item scoring (range: 0 - 48) 26     PROMIS Global-10  Originally published in 2009 in Quality of Life Research as a subset of the (PROMIS), the PROMIS Global-10 is a gauge of general healthcare-related quality of life. It was developed by the [place] [employer] [place] [employer] to evaluate HRQoL and is contrasted against [place] normative scores and was designed to be a  bottom-line  assessment of a patient s health that can be used for a wide variety of diseases. The results of the questions are used to calculate two summary scores:  a Global Physical Health Score and a Global Mental Health score. Higher scores are indicative of a healthier patient. The possible score ranges from 0 to 20 points in each case. 0 points represent the patient s most severe physical and/or mental impairment, while 20 points represent the best possible state of health.      In general, would you say your health is: Good   In general, would you say your quality of life is: Very good   In general, how would you rate your physical health? Good   In general, how would you rate your mental health, including your mood and your ability to think? Fair   In general, how would you rate your satisfaction with your social activities and relationships? Good   In general, please rate how well you carry out your usual social activities and roles. (This includes activities at home, at work and in your community, and responsibilities as a parent, child, spouse, employee, friend, etc.) Fair   To what extent are you able to carry out your everyday physical activities such as walking, climbing stairs, carrying groceries, or moving a chair? Completely   In the past 7 days    How often have you been bothered by emotional problems such as feeling anxious, depressed or irritable? Often   How would you rate your fatigue on average? Moderate   How would you rate your pain on average?   0 = No Pain to 10 = Worst Imaginable Pain 3     Patient Health Questionnaire 9- Item Scale  The PHQ-9 is a multipurpose instrument for screening, diagnosing, monitoring and measuring the severity of depression. It incorporates the diagnostic criterion for a depressive disorder within a brief self-report tool.     Ms. Tovar completed the PHQ-9 on an unknown date. She earned a score of 8 which placed her within the mild range. She reported experiencing little interest in activities, feeling depressed/tired, trouble sleeping, poorly eating, difficulty concentrating, and thoughts of suicide.      Generalized  Anxiety Disorder 7-Item Scale   The DIONNE-7 is a multipurpose instrument for screening, diagnosing, monitoring and measuring the severity of anxiety. It incorporates the diagnostic criterion for Generalized Anxiety Disorder yet is sensitive to other anxiety disorders within a brief self-report tool.       Ms. Tovar completed the DIONNE-7 on 06/22/2022 via My Chart and on 07/16/2022. She earned scores of 5 and 6 which placed her within the mild range. She reported feeling anxious and afraid as well as becoming easily annoyed.      [place] Multiphasic Personality Inventory-2nd Edition  First developed in the 1930's, the [place] Multiphasic Personality Inventory is a complex psychological instrument designed to measure personality characteristics of adults including mental illnesses, behaviors, thought patterns, strengths, and weaknesses. Several validity scales have been incorporated into the test in order to measure respondents  approach to the testing environment. In addition, ten standard clinical sub-scales are used to identify problem areas, problem intensity, and behaviors associated with identified characteristics. The MMPI-II, the most recent version of the instrument, was refined in the 1990's and adds additional strengths in terms of validity analysis and additional clinical sub-scales It should be noted the MMPI-II is regarded as one aspect only of a thorough diagnostic assessment and it was not normed with a standardized population including Native Americans.      Ms. Tovar was remotely administered the MMPI-2 on 08/22/2022 through the [facility]. The validity scales indicated the protocol was valid. The content and PSY-5 scales fell at or below the average range. The clinical, restructured, and supplementary scales showed elevation on items that measured somatic complaints, depressive and anxiety symptoms, antisocial behavior, and substance  use. Individuals with profiles like montrell generally present with a moderate to severe level of emotional distress characterized by dysphoria, guilt, and anxiety. These women tend to view themselves as being irritable and grouchy, although others are more aware of their dysphoria. They often experience little pleasure from life, and life is likely to be a strain for them. They tend to be chronic worries who brood and ruminate about themselves and their problems which are readily apparent to them and others. They usually over-react to minor stress with agitation, guilt, and self-punishment. These women generally lack self-confidence and feel insecure, inadequate, and inferior. They are unlikely to express anger overtly or to be aggressive towards others. Individuals with profiles like Ms. Tovar s tend to be obsessed with their perceived personal deficiencies and view themselves as useless and no good at all despite frequent evidence of their personal achievements. They may be self-deprecating and try to make others feel superior by focusing on her weaknesses and inadequacies. These women often have a hard time concentrating and keeping their minds on tasks and give up quickly when things go wrong. Their inertia and lack of drive generally reflects their depressive cognitions and negative expectations. Making any type of decision tends to be very difficult for them, and making important decisions is nearly impossible; they are unlikely to recognize their extensive dependency on others. They may be pessimistic and hopeless about the possibility of any substantial change in their circumstances. They often feel guilty when their high standards and expectations are not met. Individuals with profiles like Ms. Tovar generally are shy and introverted. These women embarrass easily and are likely to be uncomfortable around others. They may find it difficult to talk with people whom they do not know and will avoid people if  "given the opportunity.     [place] Adult ADHD Rating Scale-IV: Self and Other Reports  The BAARS-IV assesses for symptoms of Attention Deficit Hyperactivity Disorder (ADHD) that are experienced in one's daily life. This assessment measure includes self and collateral rating scales designed to provide information regarding current and childhood symptoms of ADHD including inattention, hyperactivity, and impulsivity. Self-report scores are reported as percentiles. Scores at the 76th-83rd percentile are considered marginal, scores at the 84th-92nd percentile are considered borderline, scores at the 93rd-95th percentile are considered mild, scores at the 96th-98th percentile are considered moderate, and those at the 99th percentile are considered severe. Collateral or \"other\" rating scales are reported as number of symptoms observed in comparison to those reported by the client. Norms and percentile scores are not available for collateral reports.      Current Symptoms Scale- Self Report   Ms. Tovar completed the self-report inventory of current symptoms. Her Total ADHD Score was 42 which placed her in the 97th percentile for overall ADHD symptoms. She endorsed 5/9 Inattention symptoms, 3/9 Hyperactivity-Impulsivity symptoms, and 6/9 Sluggish Cognitive Tempo symptoms. She indicated that symptoms have resulted in impaired functioning at school, home, and work.      Current Symptoms Scale- Other Report   Ms. Tovar was unable to identify an individual to complete the collateral form on her behalf.     Childhood Symptoms Scale- Self-Report  Ms. Tovar completed the self-report inventory of childhood symptoms. Her Total ADHD Score was 43 which placed her in the 95th percentile for overall ADHD symptoms in childhood. She endorsed 4/9 Inattention symptoms and 4/9 Hyperactivity-Impulsivity symptoms. Ms. Tovar indicated that symptoms emerged at the age of 8 years old and impaired her functioning at school and home.   " "     Childhood Scales- Other Report  Ms. Tovar s parents completed the collateral inventory of childhood symptoms. The Total ADHD Score was 18 which placed her in the 50th percentile for overall ADHD symptoms. Her parents endorsed 0/9 Inattention symptoms and 0/9 Hyperactivity-Impulsivity symptoms. Ms. Tovar s parents did not indicate at what age symptoms emerged and did not state in which setting symptoms impaired her functioning.                           Grazyna Functional Impairment Scale: Self and Other Reports (BFIS-LF)  The BFIS is used to assess an individuals' psychosocial impairment in major life/daily activities that may be due to a mental health disorder. This assessment measure includes self and collateral rating scales. Self-report scores are reported as percentiles. Scores at the 76th-83rd percentile are considered marginal, scores at the 84th-92nd percentile are considered borderline, scores at the 93rd-95th percentile are considered mild, scores at the 96th-98th percentile are considered moderate, and those at the 99th percentile are considered severe. Collateral or \"other\" rating scales are reported as number of symptoms observed in comparison to those reported by the client. Norms and percentile scores are not available for collateral reports.      Ms. Tovar earned a Mean Impairment Score of 3.1 (75th percentile). She outlined no deficits in psychosocial functioning. Ms. Tovar was unable to identify an individual to complete the collateral form on her behalf.      G[place]yna Deficits in Executive Functioning Scale (BDEFS)  The BDEFS is a measure used for evaluating dimensions of adult executive functioning in daily life. This assessment measure includes self and collateral rating scales. Self-report scores are reported as percentiles. Scores at the 76th-83rd percentile are considered marginal, scores at the 84th-92nd percentile are considered borderline, scores at the 93rd-95th percentile " "are considered mild, scores at the 96th-98th percentile are considered moderate, and those at the 99th percentile are considered severe. Collateral or \"other\" rating scales are reported as number of symptoms observed in comparison to those reported by the client. Norms and percentile scores are not available for collateral reports.     Ms. Tovar earned a Total Executive Functioning Score of 233 (95th percentile). The ADHD-Executive Functioning Index score was 30 (97th percentile). She reported significant deficits in the areas of time management, organization, problem solving, self-restraint, and motivation. Ms. Tovar was unable to identify an individual to complete the collateral form on her behalf.     CNS Vital Signs Neurocognitive Battery  The CNS Vital Signs Neurocognitive Battery is a remotely-administered assessment comprised of seven core subtests to individually measure the patient's verbal memory, visual memory, motor speed, psychomotor speed, reaction time, focus, ability to sustain attention and ability to adapt to changing rules and tasks.     Ms. Tovar was remotely administered the CNS Vital Signs on 08/29/2022 through the [facility]. The results are detailed below. Of note, above average domain scores indicate a standard score (SS) greater than 109 or a Percentile Rank (AL) greater than 74, indicating a high functioning test subject. Average is a SS  or AL 25-74, indicating normal function. Low Average is a SS 80-89 or AL 9-24 indicating a slight deficit or impairment. Below Average is a SS 70-79 or AL 2-8, indicating a moderate level of deficit or impairment. Very Low is a SS less than 70 or a AL less than 2, indicating a deficit and impairment. Validity Indicator denotes a guideline for representing the possibility of an invalid test or domain score, and can be influenced by patient understanding, effort, or other conditions. To " confirm a diagnosis of ADHD, focus is given to the Complex Attention, Cognitive Flexibility, Processing Speed, Executive Function and Simple Attention scales which are most sensitive to the condition.       The Neurocognitive Index (NCI) measures an average score derived from the domain scores or a general assessment of the overall neurocognitive status of the patient. Ms. Tovar earned an NCI score of 101 (53rd percentile) which placed her within the average range.     The Composite Memory scale measures how well subject can recognize, remember, and retrieve words and geometric figures, and is comprised of the Visual and Verbal Memory domains. Ms. Tovar earned a Composite Memory score of 90 (25th percentile) which placed her within the average range.     The Verbal Memory scale measures how well subject can recognize, remember, and retrieve words. Ms. Tovar earned a Verbal Memory score of 82 (12th percentile) which placed her within the average range.    The Visual Memory scale measures how well subject can recognize, remember and retrieve geometric figures. Ms. Tovar earned a visual memory score of 101 (53rd percentile) which placed her within the average range.    The Psychomotor Speed scale measures how well a subject perceives, attends, responds to complex visual-perceptual information and performs simple fine motor coordination, and is comprised of the Motor Speed and Processing Speed indexes. Ms. Tovar earned a Psychomotor Speed score of 107 (68th percentile) which placed her within the average range.     The Reaction Time scale measures how quickly the subject can react, in milliseconds, to a simple and increasingly complex direction set. Ms. Tovar s Reaction Time score was 85 (16th percentile) which placed her within the low average range.     The Complex Attention scale measures the ability to track and respond to a variety of stimuli over lengthy periods of time and/or perform complex  mental tasks requiring vigilance quickly and accurately. Ms. Tovar earned a Complex Attention score of 114 (82nd percentile) which placed her within the above average range.     The Cognitive Flexibility scale measures how well subject can adapt to rapidly changing and increasingly complex set of directions and/or to manipulate the information. Ms. La villa Cognitive Flexibility score was 109 (73rd percentile) which placed her within the average range.     The Processing Speed scale measures how well a subject recognizes and processes information i.e., perceiving, attending/responding to incoming information, motor speed, fine motor coordination, and visual-perceptual ability. Ms. La villa Processing Speed score was 117 (87th percentile) which placed her within the above average range.     The Executive Function scale measures how well a subject recognizes rules, categories, and manages or navigates rapid decision making. Ms. La villa Executive Function was 108 (70th percentile) which placed her within the average range.    The Simple Attention scale measures the ability to track and respond to a single defined stimulus over lengthy periods of time while performing vigilance and response inhibition quickly and accurately to a simple task. Ms. La villa Simple Attention score was 94 (34th percentile) which placed her in the average range.     The Motor Speed scale measures the ability to perform simple movements to produce and satisfy an intention towards a manual action and goal. Ms. La villa Motor Speed score was 98 (45th percentile) which placed her within the average range.     Domain Standard Score Percentile Description Validity   Neurocognitive Index 101 53 Average Yes   Composite Memory Measure 90 25 Average Yes   Verbal Memory 82 12 Low average Yes   Visual Memory 101 53 Average Yes   Psychomotor Speed 107 68 Average Yes   Reaction Time 85 16 Low average Yes   *Complex Attention 114 82 Above  average Yes   *Cognitive Flexibility 109 73 Average Yes   *Processing Speed 117 87 Above average Yes   *Executive Function 108 70 Average Yes   *Simple Attention 94 34 Average Yes   Motor Speed 98 45 Average Yes     Diagnostic Impressions:  Major Depressive Disorder, Recurrent, Mild   Other Anxiety Disorder  Ms. Tovar presented with symptoms of depression and anxiety. She outlined adverse childhood events. She described her relationship with her father as emotionally and physically distant. She stated that her relationship with her mother was good yet there were no boundaries between them which was inappropriate, and she had to provide her mother with social-emotional support. She asserted that mental health symptoms emerged during her youth. She recalled crying spells and feeling like no one liked her. She acknowledged symptoms of social anxiety yet denied excessive generalized worries. She stated that her mother and partner tried to support her, but she could not be consoled. She reported that mood symptoms ebbed and flowed throughout the years. She highlighted she can perform and get through despite symptoms being present. She indicated that mood symptoms worsened after megan COVID-19 with increased fatigue and decreased motivation. When administered the MMPI-II and symptoms screeners, scores were clinically significant.     RULED OUT: Attention Deficit Hyperactivity Disorder  Ms. Tovar did not meet criterion for ADHD. She recalled symptoms emerging at the age of 8 years old. She indicated she had difficulty starting and completing homework and managing their emotions. Ms. Tovar s Childhood Symptoms forms from the Grazyna Reports, as completed by her and her parents were mixed. She outlined clinically significant impairment in childhood from ADHD symptoms, but her parents did not. In adulthood, Ms. Tovar indicated she has been struggling to focus and complete tasks at home and work. She  acknowledged that at work she is easily bored and distracted, had difficulty getting along with others, has trouble organizing and problems learning new tasks/information. The results of the remaining Grazyna Reports as completed by only her also were mixed. She outlined clinically significant impairment from ADHD symptoms in the areas of current and executive functioning. When administered cognitive measures, the five items that are sensitive to ADHD symptoms fell in the average to the high average range.     Alcohol Use Disorder  History of Stimulant Use  Ms. Tovar outlined a problematic pattern of alcohol and cocaine use. She reported she consumed alcohol and smoked cannabis for the first time at the age of 13 years old. She indicated that at the age of 16 years old, she was drinking alcohol regularly with peers. 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 She highlighted that her mother expressed the belief that the illness kept her from becoming an alcoholic. She acknowledged she returned to drinking after treatment. She asserted that while residing in [place], her alcohol use increased. She reported that when she transferred to [place] her alcohol use decreased because she did not have as many friends. 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 She stated that throughout the years she has discontinued use for times. She highlighted that on 05/08/2022, she stopped drinking alcohol but two weeks ago, she drank 5 days in a row. She recognized she has been medicating something with alcohol. She disclosed she abused Adderall in college and from the ages of 26 to 34 years old, she abused cocaine. She reported that her use of cocaine in her mid-twenties occurred every workday.     Recommendations:    1. Ms. Tovar would likely benefit from engaging in  medication management services. They were recommended to follow their provider s treatment guidelines and recommendations. If their diagnoses are beyond the scope of practice for their primary care provider, they should be recommended to a psychiatric provider.    2. Ms. Tovar was recommended to participate in cognitive testing to further assess her attention, working memory and processing speed.    3. Ms. Tovar likely would benefit from continuing to participate in individual therapy.     Psychological Testing Bill/Service Summary:  Interview/Assessment Date Time   Interview 6/22/2022; 6/29/2022; 7/19/2022 [id number] (1); [phone] (1); [id number] (1) Previously billed   Documentation 6/22/2022; 6/29/2022; 7/19/2022 [id number] (.25); [phone] (.25); [id number] (.25) Previously billed   Testing Evaluation 33538- 1st 60 mins 88413- each add 60   Record Review & Clarify Referral Question     Clinical Decision Making     Patient Symptom Management     Battery Modification     Integration/ Report Generation 8/31/2022; 9/06/2022; 9/07/2022 [id number] (.5); [phone] (1); [id number] (.25)   Feedback Session 9/09/[phone] (1)   Post-Service Work 9/09/2022 [id number] (.25)   Total Time 3    Total Units 1 2   Test Administration 42117- 1st 30 mins 27617- each add 30    Test Administration (Face)     Scoring     Total Time 0    Total Units 0    Diagnoses MDD, Anxiety

## 2022-09-09 NOTE — PROGRESS NOTES
[facility]                                     Progress Note    Patient Name: Vidhi Tovar  Date: 2022         Service Type: Individual      Session Start Time: 709  Session End Time: 802     Session Length: 54    Session #: 4    Attendees: Client    Service Modality:  Video Visit:      Provider verified identity through the following two step process.  Patient provided:  Patient     Telemedicine Visit: The patient's condition can be safely assessed and treated via synchronous audio and visual telemedicine encounter.      Reason for Telemedicine Visit: Services only offered telehealth    Originating Site (Patient Location): Patient's home    Distant Site (Provider Location): Provider Remote Setting- Home Office    Consent:  The patient/guardian has verbally consented to: the potential risks and benefits of telemedicine (video visit) versus in person care; bill my insurance or make self-payment for services provided; and responsibility for payment of non-covered services.     Patient would like the video invitation sent by:  My Chart    Mode of Communication:  Video Conference via Amwell    As the provider I attest to compliance with applicable laws and regulations related to telemedicine.    DATA  Interactive Complexity: No  Crisis: No        Progress Since Last Session (Related to Symptoms / Goals / Homework):   Symptoms: No change stable, chronic, some improvement    Homework: None      Episode of Care Goals: Satisfactory progress - PREPARATION (Decided to change - considering how); Intervened by negotiating a change plan and determining options / strategies for behavior change, identifying triggers, exploring social supports, and working towards setting a date to begin behavior change     Current / Ongoing Stressors and Concerns:   Ms. Tovar stated that she experienced 2 weeks of vertigo which she was unsure if it was a virus, long COVID or psycho-somatic. She highlighted  that symptoms remitted when she returned to work. She reported that she read Adult Children of Emotionally Immature Parents and [place] [employer] which were helpful books. She reported she took too vacations which also gave her time to rest and think.      Treatment Objective(s) Addressed in This Session:   Review findings of ADHD Eval including testing results diagnoses, and recommendations     Intervention:   During today's session, Ms. Tovar was provided with feedback regarding his ADHD Evaluation. This writer outlined the contents of the report Ms. Tovar informed him of the results of the symptom screenings. When administered the PHQ-9 and DIONNE-7, Ms. La villa scores fell in the mild range. She stated that mood symptoms have been stable over the last month. This writer reviewed the results of the MMPI-2. The content and PSY-5 scales fell at or below the average range. The clinical, restructured, and supplementary scales showed elevation on items that measured somatic complaints, depressive and anxiety symptoms, antisocial behavior, and substance use. Individuals with profiles like montrell generally present with a moderate to severe level of emotional distress characterized by dysphoria, guilt, and anxiety. These women tend to view themselves as being irritable and grouchy, although others are more aware of their dysphoria. They often experience little pleasure from life, and life is likely to be a strain for them. They tend to be chronic worries who brood and ruminate about themselves and their problems which are readily apparent to them and others. They usually over-react to minor stress with agitation, guilt, and self-punishment. These women generally lack self-confidence and feel insecure, inadequate, and inferior. They are unlikely to express anger overtly or to be aggressive towards others. Individuals with profiles like Ms. La villa tend to be obsessed with their perceived personal deficiencies and view  themselves as useless and no good at all despite frequent evidence of their personal achievements. They may be self-deprecating and try to make others feel superior by focusing on her weaknesses and inadequacies. These women often have a hard time concentrating and keeping their minds on tasks and give up quickly when things go wrong. Their inertia and lack of drive generally reflects their depressive cognitions and negative expectations. Making any type of decision tends to be very difficult for them, and making important decisions is nearly impossible; they are unlikely to recognize their extensive dependency on others. They may be pessimistic and hopeless about the possibility of any substantial change in their circumstances. They often feel guilty when their high standards and expectations are not met. Individuals with profiles like Ms. Tovar generally are shy and introverted. These women embarrass easily and are likely to be uncomfortable around others. They may find it difficult to talk with people whom they do not know and will avoid people if given the opportunity. She was diagnosed with anxiety and depression; she agreed with the diagnoses.     This writer also discussed the results of the [facility] Reports. Ms. Tovar s Childhood Symptoms forms from the [facility] Reports as completed by her and her parents were mixed. She outlined clinically significant impairment in childhood due to ADHD but her parents did not. The results of the remaining [facility] Reports as completed only by her were indicative of clinically significant impairment from ADHD symptoms in all areas of functioning. When administered cognitive measures, the five items that are sensitive to ADHD symptoms fell in the average to the high average range. She did not meet criterion for ADHD. This writer reviewed recommendations and answered questions.    Assessments completed prior to visit:  CNS Vital Signs   Domain Standard Score Percentile  Description Validity   Neurocognitive Index 101 53 Average Yes   Composite Memory Measure 90 25 Average Yes   Verbal Memory 82 12 Low average Yes   Visual Memory 101 53 Average Yes   Psychomotor Speed 107 68 Average Yes   Reaction Time 85 16 Low average Yes   *Complex Attention 114 82 Above average Yes   *Cognitive Flexibility 109 73 Average Yes   *Processing Speed 117 87 Above average Yes   *Executive Function 108 70 Average Yes   *Simple Attention 94 34 Average Yes   Motor Speed 98 45 Average Yes     PHQ9: No flowsheet data found.  GAD7:   DIONNE-7 SCORE 6/22/2022 6/22/2022   Total Score - 5 (mild anxiety)   Total Score 5 5         ASSESSMENT: Current Emotional / Mental Status (status of significant symptoms):   Risk status (Self / Other harm or suicidal ideation)   Patient denies current fears or concerns for personal safety.   Patient denies current or recent suicidal ideation or behaviors.   Patient denies current or recent homicidal ideation or behaviors.   Patient denies current or recent self injurious behavior or ideation.   Patient denies other safety concerns.   Patient reports there has been no change in risk factors since their last session.     Patient reports there has been no change in protective factors since their last session.     Recommended that patient call 911 or go to the local ED should there be a change in any of these risk factors.     Appearance:   Appropriate    Eye Contact:   Good    Psychomotor Behavior: Normal    Attitude:   Cooperative    Orientation:   All   Speech    Rate / Production: Normal     Volume:  Normal    Mood:    Anxious  Depressed    Affect:    Appropriate    Thought Content:  Clear    Thought Form:  Coherent  Logical    Insight:    Good  and Fair      Medication Review:   No changes to current psychiatric medication(s)     Medication Compliance:   Yes     Changes in Health Issues:   Yes: long COVID symptoms     Chemical Use Review:   Substance Use: decreased alcohol  use     Tobacco Use: No current tobacco use.      Diagnosis:  1. Mild episode of recurrent major depressive disorder (H)    2. Anxiety    3. Attention deficit hyperactivity disorder (ADHD) evaluation    Major Depressive Disorder, Recurrent, Mild   Other Anxiety Disorder  Ms. Tovar presented with symptoms of depression and anxiety. She outlined adverse childhood events. She described her relationship with her father as emotionally and physically distant. She stated that her relationship with her mother was good yet there were no boundaries between them which was inappropriate, and she had to provide her mother with social-emotional support. She asserted that mental health symptoms emerged during her youth. She recalled crying spells and feeling like no one liked her. She acknowledged symptoms of social anxiety yet denied excessive generalized worries. She stated that her mother and partner tried to support her, but she could not be consoled. She reported that mood symptoms ebbed and flowed throughout the years. She highlighted she can perform and get through despite symptoms being present. She indicated that mood symptoms worsened after megan COVID-19 with increased fatigue and decreased motivation. When administered the MMPI-II and symptoms screeners, scores were clinically significant.     RULED OUT: Attention Deficit Hyperactivity Disorder  Ms. Tovar did not meet criterion for ADHD. She recalled symptoms emerging at the age of 8 years old. She indicated she had difficulty starting and completing homework and managing their emotions. Ms. Tovar s Childhood Symptoms forms from the Graz[facility] Reports, as completed by her and her parents were mixed. She outlined clinically significant impairment in childhood from ADHD symptoms, but her parents did not. In adulthood, Ms. Tovar indicated she has been struggling to focus and complete tasks at home and work. She acknowledged that at work she is easily bored  and distracted, had difficulty getting along with others, has trouble organizing and problems learning new tasks/information. The results of the remaining Grazyna Reports as completed by only her also were mixed. She outlined clinically significant impairment from ADHD symptoms in the areas of current and executive functioning. When administered cognitive measures, the five items that are sensitive to ADHD symptoms fell in the average to the high average range.     Alcohol Use Disorder  History of Stimulant Use  Ms. Tovar outlined a problematic pattern of alcohol and cocaine use. She reported she consumed alcohol and smoked cannabis for the first time at the age of 13 years old. She indicated that at the age of 16 years old, she was drinking alcohol regularly with peers. 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 She highlighted that her mother expressed the belief that the illness kept her from becoming an alcoholic. She acknowledged she returned to drinking after treatment. She asserted that while residing in [place], her alcohol use increased. She reported that when she transferred to [place] her alcohol use decreased because she did not have as many friends. 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 She stated that throughout the years she has discontinued use for times. She highlighted that on 05/08/2022, she stopped drinking alcohol but two weeks ago, she drank 5 days in a row. She recognized she has been medicating something with alcohol. She disclosed she abused Adderall in college and from the ages of 26 to 34 years old, she abused cocaine. She reported that her use of cocaine in her mid-twenties occurred every workday.     Recommendations:    1. Ms. Tovar would likely benefit from engaging in medication management services. They were  recommended to follow their provider s treatment guidelines and recommendations. If their diagnoses are beyond the scope of practice for their primary care provider, they should be recommended to a psychiatric provider.    2. Ms. Tovar was recommended to participate in cognitive testing to further assess her attention, working memory and processing speed.    3. Ms. Tovar likely would benefit from continuing to participate in individual therapy.       Collateral Reports Completed:   Routed note to Care Team Member(s)      Bibiana Cochran, PhD LP  September 9, 2022    Psychological Testing Bill/Service Summary:  Interview/Assessment Date Time   Interview 6/22/2022; 6/29/2022; 7/19/2022 [id number] (1); 706-800 (1); [phone] (1) Previously billed   Documentation 6/22/2022; 6/29/2022; 7/19/2022 7126-0323 (.25); [phone] (.25); [id number] (.25) Previously billed   Testing Evaluation 39649- 1st 60 mins 08847- each add 60   Record Review & Clarify Referral Question     Clinical Decision Making     Patient Symptom Management     Battery Modification     Integration/ Report Generation 8/31/2022; 9/06/2022; 9/07/2022 1071-6524 (.5); 730-830 (1); 8972-8617 (.25)   Feedback Session 9/09/[phone] (1)   Post-Service Work 9/09/2022 3596-5704 (.25)   Total Time 3    Total Units 1 2   Test Administration 09189- 1st 30 mins 16268- each add 30    Test Administration (Face)     Scoring     Total Time 0    Total Units 0    Diagnoses MDD, Anxiety

## 2022-09-28 ENCOUNTER — PSYCHE (OUTPATIENT)
Dept: PSYCHOLOGY | Facility: CLINIC | Age: 42
End: 2022-09-28
Payer: COMMERCIAL

## 2022-09-28 DIAGNOSIS — Z13.39 ATTENTION DEFICIT HYPERACTIVITY DISORDER (ADHD) EVALUATION: Primary | ICD-10-CM

## 2022-09-28 ASSESSMENT — ANXIETY QUESTIONNAIRES
6. BECOMING EASILY ANNOYED OR IRRITABLE: SEVERAL DAYS
3. WORRYING TOO MUCH ABOUT DIFFERENT THINGS: NOT AT ALL
7. FEELING AFRAID AS IF SOMETHING AWFUL MIGHT HAPPEN: NOT AT ALL
GAD7 TOTAL SCORE: 4
1. FEELING NERVOUS, ANXIOUS, OR ON EDGE: SEVERAL DAYS
4. TROUBLE RELAXING: SEVERAL DAYS
GAD7 TOTAL SCORE: 4
5. BEING SO RESTLESS THAT IT IS HARD TO SIT STILL: SEVERAL DAYS
2. NOT BEING ABLE TO STOP OR CONTROL WORRYING: NOT AT ALL

## 2022-09-28 ASSESSMENT — PATIENT HEALTH QUESTIONNAIRE - PHQ9: SUM OF ALL RESPONSES TO PHQ QUESTIONS 1-9: 6

## 2022-09-28 NOTE — PROGRESS NOTES
Ms. Tovar presented today for cognitive testing as part of the ADHD Eval. She was administered Trails A &B and the WAIS-IV    Testing 0634-2570 (1.5)  Scoring 9779-4070 (.25)

## 2022-09-30 ENCOUNTER — FCC EXTENDED DOCUMENTATION (OUTPATIENT)
Dept: PSYCHOLOGY | Facility: CLINIC | Age: 42
End: 2022-09-30

## 2022-09-30 ENCOUNTER — VIRTUAL VISIT (OUTPATIENT)
Dept: PSYCHOLOGY | Facility: CLINIC | Age: 42
End: 2022-09-30
Payer: COMMERCIAL

## 2022-09-30 DIAGNOSIS — F41.9 ANXIETY: ICD-10-CM

## 2022-09-30 DIAGNOSIS — F90.2 ATTENTION DEFICIT HYPERACTIVITY DISORDER (ADHD), COMBINED TYPE: ICD-10-CM

## 2022-09-30 DIAGNOSIS — F33.0 MILD EPISODE OF RECURRENT MAJOR DEPRESSIVE DISORDER (H): Primary | ICD-10-CM

## 2022-09-30 PROCEDURE — 96137 PSYCL/NRPSYC TST PHY/QHP EA: CPT | Performed by: PSYCHOLOGIST

## 2022-09-30 PROCEDURE — 96130 PSYCL TST EVAL PHYS/QHP 1ST: CPT | Mod: 95 | Performed by: PSYCHOLOGIST

## 2022-09-30 PROCEDURE — 96136 PSYCL/NRPSYC TST PHY/QHP 1ST: CPT | Performed by: PSYCHOLOGIST

## 2022-09-30 NOTE — PROGRESS NOTES
[facility]  Attention Deficit Hyperactivity Disorder Evaluation    Name: Vidhi Tovar   : 1980    Examined By: Bibiana Cochran PsyD, LP     Sources of Information & Assessment Measures:    Trails A & B, Administered 2022    Wechsler Adult Intelligence Scale- 4th Edition, Administered on 2022    Generalized Anxiety Disorder 7-Item Scale, Completed 2022 & 2022    Patient Health Questionnaire 9- Item Scale, Completed 2022    [facility], Medical Chart, Reviewed 2022    CNS Vital Signs, Completed 2022    [place] Multiphasic Personality Inventory-2nd Edition, Administered 2022    BFIS-LF: Self-Report, Dated 2022    BDEFS-LF: Self-Report, Dated 2022    BAARS-IV: Self-Report: Current Symptoms, Dated 2022    BAARS-IV: Self-Report: Childhood Symptoms, Dated 2022    Clinical Interviews, Conducted 2022, 2022 & 2022    BAARS-IV: Other-Report: Childhood Symptoms, Completed by Heaven Jaffe & Teddy Tovar (Parents), Dated 2022    World Health Organization Disability Assessment Schedule 2.0, Completed 2022    PROMIS Global-10, Completed on 2022    Identifying Information:  Ms. Tovar is a 42-year-old, woman; she spoke English, female pronouns were used, and she identified no cultural considerations for treatment. The clinical interviews took place via telemedicine due to the Corona Virus outbreak. This writer gathered her background information at the time of each session.     Client's Statement of Presenting Concern:  Ms. Tovar was referred for an Attention Deficit Hyperactivity Disorder Evaluation by MELA Langford CNP on 2022 due to poor concentration and inattention. The purpose of the evaluation was to provide an opinion regarding possible mental health issues or deficits and treatment recommendations.     History of Presenting Concern:  Ms. Tovar indicated she has  been engaged in individual therapy for the past 2 years. She stated she has been prescribed Xanax to manage anxiety symptoms; diagnostic assessment has not been completed. She stated that her therapist recommended an evaluation to assess  baseline  functioning and she expressed concern regarding  impulse control.  She questioned if ADHD is present because she  zones out  and has difficulty completing tasks. She highlighted she was given academic accommodations as a youth without a formal diagnosis. She acknowledged that symptoms impair her functioning at home and work as well as in social relationships.     Background Information:  Developmental History  Ms. Tovar was the younger of two children born to her parents. She was raised in [place], [place] along with her older brother. She acknowledged that her parents  marriage was unhealthy, and her mother told her she was  dissatisfied  with the relationship; she denied witnessing incidents of domestic violence. She disclosed that her father was emotionally and physically distant. She expressed the belief that her father  didn t really want kids.  She described her relationship with her father as  disappointing  because he was  absent  and  worked all the time.  She highlighted that when she was 12 years old, she told him she wanted  more  from their relationship, but his behavior did not change. She stated that her relationship with her mother was  generally good  yet there were no boundaries between them which was  inappropriate.  She acknowledged having to provide her mother with social-emotional support. She indicated that her relationship with her brother was  up and down  during their youth. She highlighted that her brother began abusing substances as a teen and lost a best friend during that time. She reported that her brother s struggles required a lot of attention, yet he was not provided the right support or treatment.     Significant Relationships  and Supports    Intimate Relationships  Ms. Tovar indicated she is currently single and not actively dating because she is  working all the time  and does not  have the bandwidth for it.  She stated that her last romantic relationship ended in January of 2022 after a month of dating. She indicated that her longest romantic relationship lasted 6 years. She recalled meeting her former partner while living in the [facility]. She acknowledged that her partner was emotionally distant. She expressed the belief that the relationship could have worked because he was very supportive of her career but there was not enough of a connection. She denied incidents of domestic violence. She outlined other romantic relationships with men but questioned if she has ever been in love.     Relationships with Family Members  Ms. Tovar stated that her parents  marriage continues to be  tinged with tension.  She highlighted she has  quite a bit  of contact with her parents because they reside in the same complex. She reported she moved into the building a year and a half ago. She indicated that her father expressed feeling comforted that she is in the building, yet they do not have a close relationship emotionally. She acknowledged that her relationship with her mother has become  worse  and more  complicated  over time. She recognized the enmeshment. Ms. Tovar asserted her brother currently resides in the [facility] and continues to struggle with substance use. She highlighted that most of their contact revolves around his daughter.     Relationships with Peers  Ms. Tovar reported that when she moved back to [place] in June of 2021, she reconnected with a childhood friend who introduced her to other individuals. She acknowledged  drinking a ton  with these individuals. She asserted that her interactions with them have decreased since she stopped drinking. She identified another childhood friend who she had an  off and on relationship with and they have recently reconnected. She admitted she has  taken breaks  from other friends.    Educational & Occupational History  Ms. Tovar attended [employer] beginning in  through her senior year; she graduated a semester early in the year 1998. She recalled that her  expressed concern to her mother that her learning was not meeting her estimated intelligence. She asserted she received academic accommodations despite not having a formal diagnosis. She acknowledged that her parents did not follow through with a recommendation for testing. She stated that teachers gave examinations orally or had her take the tests alone in a quiet room. She indicated she earned  medium grades.  She highlighted she excelled in humanities and anatomy courses. She stated she had difficulty with mathematics because the concepts did not  permeate.  She reported she got along with most teachers  fantastically  and maintains a relationship with her high school counselor. She acknowledged she was reprimanded  always for talking too much.  She denied trouble making or keeping friends.     Following high school, Ms. Tovar enrolled at the [employer]. She stated that her transition was  okay  because she  made friends.  She highlighted she was  ostracized  by a group of men after four of them expressed romantic interest in her, but she was interested in a fifth. She reported that the friendships ended due to the romantic feelings amongst everyone. She recalled earning a 2.8 grade point average; a lower grade point as compared to high school. She indicated that for her sophomore year, she transferred to [employer] in [place], [place] where her mother attended school. She stated that the experience was  fine.  She highlighted she lived with a group of transfer students who were not age wise her peers. She added she played basketball and established a romantic  relationship. She could not recall what grades she earned yet highlighted she was not placed on probation. She indicated she studied abroad in the country of [place] in the year 2001. She stated she majored in sociology and graduated in the year 2002.     Ms. Tovar stated she returned to [place] after college and worked in the service industry for 2 years. 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 She asserted she only spoke to coworkers about pooling tips. She indicated she was unemployed for 4 months and then took a job as a  for political fundraising. She reported that a coworker introduced her to the [employer] campaign to manage  advance events.  She stated that when the election cycle ended, she relocated to [place] with a partner/coworker. 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 She reported she then took a job as an . She acknowledged she left the position on  bad terms  but could not remember the events which led her to leave. She reported returned to the [employer] where she worked as an . She highlighted that she began working  advance  again for the [employer] campaign in the year 2005. 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 She reported she has a  good base of clients  yet described it as an  insane  schedule.     Mental Health History:  Ms. Tovar asserted that mental health symptoms emerged during her youth. She recalled crying spells and  constantly feeling like no one liked [her].  She acknowledged symptoms of social anxiety yet denied  excessive generalized worries. She stated that her mother and partner tried to support her, but she could not be consoled. She reported that mood symptoms  ebbed and flowed  throughout the years. She highlighted she can  perform  and  get through  despite symptoms being present. She indicated that mood symptoms worsened after megan COVID-19 with increased fatigue and decreased motivation.     Patient's Strengths and Limitations  Patient identified the following strengths or resources that will help them succeed in treatment: motivation. Things that may interfere with their success in treatment include mental health symptoms.     Health/Medical History:  Ms. Tovar asserted that her physical health is  generally good,  but in the year 2022, she was diagnosed with COVID-19 and tonsilitis. She expressed feeling like she is getting ill  very often  this year but this has not been the case in the past. She outlined continued symptoms of COVID-19 including decreased mental acuity, fatigue, and lack of smell/ taste. She reported that 7 years ago, she got an IUD. She stated she does not  bleed a lot  but has  a lot of mood swings  and  pain  around menstruation. She acknowledged she is overweight. She described her diet as  toddler like  as she often consumes peanut butter and jelly sandwiches. She admitted she forgets to eat when working. She highlighted she has  mostly cut out caffeine because it makes [her] jittery.  She stated she does not have a regular exercise routine.     Patient does report a family history of mental health concerns. Patient reports family history includes Cancer in her maternal grandfather and paternal grandmother; Depression in her brother; Diabetes in her maternal grandmother; Hypertension in her father; Substance Abuse in her maternal grandmother and paternal grandfather.      Patient reports current meds as       Medication Sig     ALPRAZolam (XANAX) 0.25 MG tablet Take 1 tablet (0.25  mg) by mouth 3 times daily as needed for anxiety     scopolamine (TRANSDERM-SCOP, 1.5 MG,) 1 MG/3DAYS 72 hr patch Place 1 patch onto the skin every 72 hours Apply to hairless area behind one ear at least 4 hours before travel.      Medication Adherence  Patient reports taking prescribed medications as prescribed.     Patient Allergies    No Known Allergies     Past Medical History   Diagnosis Date     Fear of flying       Herpes simplex virus infection       Rosacea       Vitamin D deficiency       Vitiligo           Current Mental Status Exam:   Appearance:                 Appropriate    Eye Contact:                 Good   Psychomotor:               Normal, Restless       Gait / station:            Not assessed  Attitude / Demeanor:    Cooperative   Speech      Rate / Production:    Normal, Responsive      Volume:                    Normal        Language:                 Intact  Mood:                           Anxious   Affect:                           Appropriate, Tearful   Thought Content:         Clear   Thought Process:          Coherent, Goal Directed       Associations:            No loosening of associations  Insight:                          Good, Fair   Judgment:                     Intact   Orientation:                  All  Attention/concentration:         Good    Substance Use History:  Ms. Tovar reported she consumed alcohol and smoked cannabis for the first time at the age of 13 years old. She indicated that at the age of 16 years old, she was drinking alcohol  regularly  with peers. 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 She highlighted that her mother expressed the belief that the illness kept her from becoming an alcoholic. She acknowledged she returned to drinking after treatment. She asserted that while residing in [place], her alcohol use increased. She reported that when she transferred to [place]  her alcohol use decreased because she did not have as many friends. 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  She stated that throughout the years she has  taken a few months off  from drinking. She highlighted that on 05/08/2022, she stopped drinking alcohol but two weeks ago, she drank 5 days in a row. She recognized she was  medicating something  with alcohol. She disclosed that throughout the years, her cannabis use has  tapered off  because she feels  hungover  when smoking. She asserted she may ingest half of a cannabis gummy. She acknowledged  getting high at school  as a youth and daily use. She disclosed she abused Adderall and opium in college. She also abused cocaine from the age of 26 to 34 years old. She reported that her use in her mid-twenties occurred  every workday.      Trauma History:  Ms. Tovar denied ever witnessing or experiencing a domestic violence, childhood abuse, a life-threatening event or sexual assault. However, she outlined to sexual/romantic relationships with men who were significantly older than her and in an authority role.     Risk Taking Behaviors:  Ms. Tovar reported a history of the following risk-taking behaviors: excessive substance use, risky sexual behaviors, impulsive decision making, and spending.     Motor Vehicle Operation:  Ms. Tovar stated she was issued her 's license at the age of 16 years old. She reported she has been cited for speeding on two occasions. She asserted she is  addicted to [employer] [maps]  because she easily gets lost or misses turns. She acknowledged she experiences road rage. She expressed the belief that others feel safe riding in the car care while she is driving.     Safety Assessment:   Current Safety Concerns  Patient denies current homicidal ideation and behaviors.  Patient denies current self-injurious ideation and behaviors.    Patient denied risk  behaviors associated with substance use.  Patient denies any high-risk behaviors associated with mental health symptoms.  Patient reports the following current concerns for their personal safety: None.  Patient reports there are not firearms in the house.       History of Safety Concerns  Patient denied a history of homicidal ideation.     Patient denied a history of personal safety concerns.    Patient denied a history of assaultive behaviors.    Patient denied a history of sexual assault behaviors.     Patient denied a history of risk behaviors associated with substance use.  Patient denies any history of high-risk behaviors associated with mental health symptoms.    Patient reports the following protective factors: other     Risk Plan:  See Recommendations for Safety and Risk Management Plan     Review of Symptoms per patient report  Depression:     Lack of interest, Change in energy level, Change in appetite, Psychomotor slowing or agitation, Feelings of hopelessness, Feelings of helplessness, Irritability, Feeling sad, down, or depressed and Withdrawn  Mera:             No Symptoms  Psychosis:       No Symptoms  Anxiety:          Social anxiety, Sleep disturbance, Poor concentration, Irritability and Anger outbursts  Panic:              Palpitations, Sense of impending doom and Vertigo (2x/wk)  Post-Traumatic Stress Disorder:  No Symptoms   Eating Disorder:          No Symptoms  ADHD:           Inattentive, Difficulties listening, Poor task completion, Poor organizational skills, Distractibility, Forgetful, Interrupts, Intrudes, Impulsive and Restlessness/fidgety  Conduct Disorder:       No symptoms  Autism Spectrum Disorder:     No symptoms  Obsessive Compulsive Disorder:       No Symptoms     Patient reports the following compulsive behaviors and treatment history: Skin Picking, Hair Pulling, Shopping / Spending, and Social Media.      Functional Status:  Ms. Tovar reported that symptoms have caused  reduced functional status in the following areas: academic performance, educational activities, health maintenance, management of the household and or completion of tasks, money management, organization, relationship(s), self-care, social interactions and work / vocational responsibilities.    Recommendations:   1. Plan for Safety and Risk Management:   Recommended that patient call 911 or go to the local ED should there be a change in any of these risk factors.     Report to child / adult protection services was NA.     2. Patient's identified no cultural considerations.     3. Initial Treatment will focus on:    ADHD Testing:  Patient was given self and collaborative rating scales to be completed prior to the next appointment. Depression and anxiety rating scales were completed.  Copies of report cards were not requested.      4. Resources/Service Plan:    services are not indicated.   Modifications to assist communication are not indicated.   Additional disability accommodations are not indicated.      5. Collaboration:   Collaboration / coordination of treatment will be initiated as needed with the following support professionals: primary care physician.      6.  Referrals:   The following referral(s) will be initiated as desired by client: Outpatient Mental Health Therapy.      A Release of Information has been obtained for the following: None.    7. ROBERT:    Discussed the general effects of drugs and alcohol on health and well-being. Provider gave patient printed information about the effects of chemical use on their health and wellbeing. Recommendations:  Use responsibly.     8. Records:   These were reviewed at time of assessment.   Information in this assessment was obtained from the medical record and  provided by patient who is a good historian. Patient will have open access to their mental health medical record.     Assessment Measures:  World Health Organization Disability Assessment Schedule  2.0   The WHODAS 2.0 short version is a 12-item measure that screens disability in adults age 18 years and older. Each self-administered item asks the individual to rate how much difficulty he or she has had in specific areas of functioning during the past 30 days.    In the past 30 days, how much difficulty did you have in:    S1 Standing for long periods such as 30 minutes None   S2 Taking care of your household responsibilities Moderate   S3 Learning a new task, for example, learning how to get to a new place? Mild     S4 Joining in community activities (for example, festivities, Yazidi or other activities) in the same way as anyone else can? Mild   S5 How much have you been emotionally affected by your health problems? Mild   S6 Concentrating on doing something for ten minutes? Moderate   S7 Walking a long distance such as a kilometre [or equivalent]? None   S8 Washing your whole body? Mild   S9 Getting dressed? Mild   S10 Dealing with people you do not know? Moderate   S11 Maintaining a friendship? Mild   S12 Your day-to-day work? Moderate   H1 Overall, in the past 30 days, how many days were these difficulties present? 20   H2 In the past 30 days, for how many days were you totally unable to carry out your usual activities or work because of any health condition? 5   H3 In the past 30 days, not counting the days that you were totally unable, for how many days did you cut back or reduce your usual activities or work because of any health condition? 5   WHODAS 2.0 12 Item scoring (range: 0 - 48) 26     PROMIS Global-10  Originally published in 2009 in Quality of Life Research as a subset of the (PROMIS), the PROMIS Global-10 is a gauge of general healthcare-related quality of life. It was developed by the [place] [employer] to evaluate HRQoL and is contrasted against [place] normative scores and was designed to be a  bottom-line  assessment of a patient s health that can be used  for a wide variety of diseases. The results of the questions are used to calculate two summary scores: a Global Physical Health Score and a Global Mental Health score. Higher scores are indicative of a healthier patient. The possible score ranges from 0 to 20 points in each case. 0 points represent the patient s most severe physical and/or mental impairment, while 20 points represent the best possible state of health.      In general, would you say your health is: Good   In general, would you say your quality of life is: Very good   In general, how would you rate your physical health? Good   In general, how would you rate your mental health, including your mood and your ability to think? Fair   In general, how would you rate your satisfaction with your social activities and relationships? Good   In general, please rate how well you carry out your usual social activities and roles. (This includes activities at home, at work and in your community, and responsibilities as a parent, child, spouse, employee, friend, etc.) Fair   To what extent are you able to carry out your everyday physical activities such as walking, climbing stairs, carrying groceries, or moving a chair? Completely   In the past 7 days    How often have you been bothered by emotional problems such as feeling anxious, depressed or irritable? Often   How would you rate your fatigue on average? Moderate   How would you rate your pain on average?   0 = No Pain to 10 = Worst Imaginable Pain 3     Patient Health Questionnaire 9- Item Scale  The PHQ-9 is a multipurpose instrument for screening, diagnosing, monitoring and measuring the severity of depression. It incorporates the diagnostic criterion for a depressive disorder within a brief self-report tool.     Ms. Tovar completed the PHQ-9 on an unknown date and on 09/28/2022. She earned scores of 8 and 6 which placed her within the mild range. She reported experiencing little interest in activities,  feeling depressed/tired, trouble sleeping, poorly eating, difficulty concentrating, and thoughts of suicide.      Generalized Anxiety Disorder 7-Item Scale   The DIONNE-7 is a multipurpose instrument for screening, diagnosing, monitoring and measuring the severity of anxiety. It incorporates the diagnostic criterion for Generalized Anxiety Disorder yet is sensitive to other anxiety disorders within a brief self-report tool.       Ms. Tovar completed the DIONNE-7 on 07/16/2022 and on 09/28/2022. She earned scores of 6 and 4 which placed her within the mild range. She reported feeling anxious and afraid as well as becoming easily annoyed.      [place] Multiphasic Personality Inventory-2nd Edition  First developed in the 1930's, the [place] Multiphasic Personality Inventory is a complex psychological instrument designed to measure personality characteristics of adults including mental illnesses, behaviors, thought patterns, strengths, and weaknesses. Several validity scales have been incorporated into the test in order to measure respondents  approach to the testing environment. In addition, ten standard clinical sub-scales are used to identify problem areas, problem intensity, and behaviors associated with identified characteristics. The MMPI-II, the most recent version of the instrument, was refined in the 1990's and adds additional strengths in terms of validity analysis and additional clinical sub-scales It should be noted the MMPI-II is regarded as one aspect only of a thorough diagnostic assessment and it was not normed with a standardized population including Native Americans.      Ms. Tovar was remotely administered the MMPI-2 on 08/22/2022 through the [facility]. The validity scales indicated the protocol was valid. The content and PSY-5 scales fell at or below the average range. The clinical, restructured, and supplementary scales showed elevation  on items that measured somatic complaints, depressive and anxiety symptoms, antisocial behavior, and substance use. Individuals with profiles like montrell generally present with a moderate to severe level of emotional distress characterized by dysphoria, guilt, and anxiety. These women tend to view themselves as being irritable and grouchy, although others are more aware of their dysphoria. They often experience little pleasure from life, and life is likely to be a strain for them. They tend to be chronic worries who brood and ruminate about themselves and their problems which are readily apparent to them and others. They usually over-react to minor stress with agitation, guilt, and self-punishment. These women generally lack self-confidence and feel insecure, inadequate, and inferior. They are unlikely to express anger overtly or to be aggressive towards others. Individuals with profiles like Ms. Tovar s tend to be obsessed with their perceived personal deficiencies and view themselves as useless and no good at all despite frequent evidence of their personal achievements. They may be self-deprecating and try to make others feel superior by focusing on her weaknesses and inadequacies. These women often have a hard time concentrating and keeping their minds on tasks and give up quickly when things go wrong. Their inertia and lack of drive generally reflects their depressive cognitions and negative expectations. Making any type of decision tends to be very difficult for them, and making important decisions is nearly impossible; they are unlikely to recognize their extensive dependency on others. They may be pessimistic and hopeless about the possibility of any substantial change in their circumstances. They often feel guilty when their high standards and expectations are not met. Individuals with profiles like Ms. Tovar generally are shy and introverted. These women embarrass easily and are likely to be  "uncomfortable around others. They may find it difficult to talk with people whom they do not know and will avoid people if given the opportunity.     Grazyna Adult ADHD Rating Scale-IV: Self and Other Reports  The BAARS-IV assesses for symptoms of Attention Deficit Hyperactivity Disorder (ADHD) that are experienced in one's daily life. This assessment measure includes self and collateral rating scales designed to provide information regarding current and childhood symptoms of ADHD including inattention, hyperactivity, and impulsivity. Self-report scores are reported as percentiles. Scores at the 76th-83rd percentile are considered marginal, scores at the 84th-92nd percentile are considered borderline, scores at the 93rd-95th percentile are considered mild, scores at the 96th-98th percentile are considered moderate, and those at the 99th percentile are considered severe. Collateral or \"other\" rating scales are reported as number of symptoms observed in comparison to those reported by the client. Norms and percentile scores are not available for collateral reports.      Current Symptoms Scale- Self Report   Ms. Tovar completed the self-report inventory of current symptoms. Her Total ADHD Score was 42 which placed her in the 97th percentile for overall ADHD symptoms. She endorsed 5/9 Inattention symptoms, 3/9 Hyperactivity-Impulsivity symptoms, and 6/9 Sluggish Cognitive Tempo symptoms. She indicated that symptoms have resulted in impaired functioning at school, home, and work.      Current Symptoms Scale- Other Report   Ms. Tovar was unable to identify an individual to complete the collateral form on her behalf.     Childhood Symptoms Scale- Self-Report  Ms. Tovar completed the self-report inventory of childhood symptoms. Her Total ADHD Score was 43 which placed her in the 95th percentile for overall ADHD symptoms in childhood. She endorsed 4/9 Inattention symptoms and 4/9 Hyperactivity-Impulsivity symptoms. " "Ms. Tovar indicated that symptoms emerged at the age of 8 years old and impaired her functioning at school and home.        Childhood Scales- Other Report  Ms. Tovar s parents completed the collateral inventory of childhood symptoms. The Total ADHD Score was 18 which placed her in the 50th percentile for overall ADHD symptoms. Her parents endorsed 0/9 Inattention symptoms and 0/9 Hyperactivity-Impulsivity symptoms. Ms. Tovar s parents did not indicate at what age symptoms emerged and did not state in which setting symptoms impaired her functioning.                           Grazyna Functional Impairment Scale: Self and Other Reports (BFIS-LF)  The BFIS is used to assess an individuals' psychosocial impairment in major life/daily activities that may be due to a mental health disorder. This assessment measure includes self and collateral rating scales. Self-report scores are reported as percentiles. Scores at the 76th-83rd percentile are considered marginal, scores at the 84th-92nd percentile are considered borderline, scores at the 93rd-95th percentile are considered mild, scores at the 96th-98th percentile are considered moderate, and those at the 99th percentile are considered severe. Collateral or \"other\" rating scales are reported as number of symptoms observed in comparison to those reported by the client. Norms and percentile scores are not available for collateral reports.      Ms. Tovar earned a Mean Impairment Score of 3.1 (75th percentile). She outlined no deficits in psychosocial functioning. Ms. Tovar was unable to identify an individual to complete the collateral form on her behalf.      Grazyna Deficits in Executive Functioning Scale (BDEFS)  The BDEFS is a measure used for evaluating dimensions of adult executive functioning in daily life. This assessment measure includes self and collateral rating scales. Self-report scores are reported as percentiles. Scores at the 76th-83rd percentile " "are considered marginal, scores at the 84th-92nd percentile are considered borderline, scores at the 93rd-95th percentile are considered mild, scores at the 96th-98th percentile are considered moderate, and those at the 99th percentile are considered severe. Collateral or \"other\" rating scales are reported as number of symptoms observed in comparison to those reported by the client. Norms and percentile scores are not available for collateral reports.     Ms. Tovar earned a Total Executive Functioning Score of 233 (95th percentile). The ADHD-Executive Functioning Index score was 30 (97th percentile). She reported significant deficits in the areas of time management, organization, problem solving, self-restraint, and motivation. Ms. Tovar was unable to identify an individual to complete the collateral form on her behalf.     CNS Vital Signs Neurocognitive Battery  The CNS Vital Signs Neurocognitive Battery is a remotely-administered assessment comprised of seven core subtests to individually measure the patient's verbal memory, visual memory, motor speed, psychomotor speed, reaction time, focus, ability to sustain attention and ability to adapt to changing rules and tasks.     Ms. Tovar was remotely administered the CNS Vital Signs on 08/29/2022 through the [facility]. The results are detailed below. Of note, above average domain scores indicate a standard score (SS) greater than 109 or a Percentile Rank (WA) greater than 74, indicating a high functioning test subject. Average is a SS  or WA 25-74, indicating normal function. Low Average is a SS 80-89 or WA 9-24 indicating a slight deficit or impairment. Below Average is a SS 70-79 or WA 2-8, indicating a moderate level of deficit or impairment. Very Low is a SS less than 70 or a WA less than 2, indicating a deficit and impairment. Validity Indicator denotes a guideline for representing the " possibility of an invalid test or domain score, and can be influenced by patient understanding, effort, or other conditions. To confirm a diagnosis of ADHD, focus is given to the Complex Attention, Cognitive Flexibility, Processing Speed, Executive Function and Simple Attention scales which are most sensitive to the condition.       The Neurocognitive Index (NCI) measures an average score derived from the domain scores or a general assessment of the overall neurocognitive status of the patient. Ms. Tovar earned an NCI score of 101 (53rd percentile) which placed her within the average range.     The Composite Memory scale measures how well subject can recognize, remember, and retrieve words and geometric figures, and is comprised of the Visual and Verbal Memory domains. Ms. Tovar earned a Composite Memory score of 90 (25th percentile) which placed her within the average range.     The Verbal Memory scale measures how well subject can recognize, remember, and retrieve words. Ms. Tovar earned a Verbal Memory score of 82 (12th percentile) which placed her within the average range.    The Visual Memory scale measures how well subject can recognize, remember and retrieve geometric figures. Ms. Tovar earned a visual memory score of 101 (53rd percentile) which placed her within the average range.    The Psychomotor Speed scale measures how well a subject perceives, attends, responds to complex visual-perceptual information and performs simple fine motor coordination, and is comprised of the Motor Speed and Processing Speed indexes. Ms. Tovar earned a Psychomotor Speed score of 107 (68th percentile) which placed her within the average range.     The Reaction Time scale measures how quickly the subject can react, in milliseconds, to a simple and increasingly complex direction set. Ms. Tovar s Reaction Time score was 85 (16th percentile) which placed her within the low average range.     The Complex Attention  scale measures the ability to track and respond to a variety of stimuli over lengthy periods of time and/or perform complex mental tasks requiring vigilance quickly and accurately. Ms. Tovar earned a Complex Attention score of 114 (82nd percentile) which placed her within the above average range.     The Cognitive Flexibility scale measures how well subject can adapt to rapidly changing and increasingly complex set of directions and/or to manipulate the information. Ms. La villa Cognitive Flexibility score was 109 (73rd percentile) which placed her within the average range.     The Processing Speed scale measures how well a subject recognizes and processes information i.e., perceiving, attending/responding to incoming information, motor speed, fine motor coordination, and visual-perceptual ability. Ms. La villa Processing Speed score was 117 (87th percentile) which placed her within the above average range.     The Executive Function scale measures how well a subject recognizes rules, categories, and manages or navigates rapid decision making. Ms. La villa Executive Function was 108 (70th percentile) which placed her within the average range.    The Simple Attention scale measures the ability to track and respond to a single defined stimulus over lengthy periods of time while performing vigilance and response inhibition quickly and accurately to a simple task. Ms. La villa Simple Attention score was 94 (34th percentile) which placed her in the average range.     The Motor Speed scale measures the ability to perform simple movements to produce and satisfy an intention towards a manual action and goal. Ms. La villa Motor Speed score was 98 (45th percentile) which placed her within the average range.     Domain Standard Score Percentile Description Validity   Neurocognitive Index 101 53 Average Yes   Composite Memory Measure 90 25 Average Yes   Verbal Memory 82 12 Low average Yes   Visual Memory 101 53  Average Yes   Psychomotor Speed 107 68 Average Yes   Reaction Time 85 16 Low average Yes   *Complex Attention 114 82 Above average Yes   *Cognitive Flexibility 109 73 Average Yes   *Processing Speed 117 87 Above average Yes   *Executive Function 108 70 Average Yes   *Simple Attention 94 34 Average Yes   Motor Speed 98 45 Average Yes     Trail Making Test   The Trail Making Test (TMT) provides information on visual search, scanning, speed of processing, mental flexibility, and executive functions. The test consists of two parts, Trail A and Tarzana B, and the score represents the amount of time required to complete the task.     Ms. Tovar was administered the TMT, Tarzana A & Tarzana B, on 09/28/2022 by this writer. She completed Tarzana A in 27.6 seconds (Mean= 28.5, SD= 10.0) which was comparable to the average score for individuals between the ages of 35 and 44 years old. She completed Trail B in 63.3 seconds (Mean= 58.4, SD= 16.4) which also was comparable to peers.     Wechsler Adult Intelligence Scales - Fourth Edition   The Wechsler Adult Intelligence Scales - Fourth Edition (WAIS - IV) consists of several subtests, each measuring a different aspect of intelligence. This instrument yields a Full-Scale IQ and four composite index scores - Verbal Comprehension, Perceptual Reasoning, Working Memory, and Processing Speed. When available, test scores are provided with 95% confidence intervals, that is, the probability is 95% that despite measurement error, the actual score falls within the given range. It should be understood that when confidence intervals are not provided, the score given does not take into account the possibility of measurement error. The actual level of ability could be somewhat higher or somewhat lower than the obtained score.      Ms. Tovar was administered the WAIS-IV on 09/28/2022 by this writer to ascertain her current level of cognitive functioning. The Full-Scale IQ (FSIQ) on the WAIS-IV  provides an overall estimate of general level of intellectual functioning. Ms. Tovar earned an FSIQ score of 117; however, due to a 25-point spread between the VCI and PSI, the General Ability Index score was calculated. She earned at GAI score of 1124 (95th percentile) which placed her within the superior range; with a 95% confidence interval, her true score likely falls between 118 and 128.    The Verbal Comprehension Index (VCI) provides a measure of verbal comprehension, knowledge and reasoning, and long-term verbal memory. Ms. Tovar earned a score of 125 (95th percentile) placed her within the superior range; with a 95% confidence interval, her true score likely falls between 118 and 130.    The Perceptual Reasoning Index (PEDRO) provides a measure of nonverbal, fluid reasoning, attentiveness to detail, and visuomotor integration. It requires a person to meet new situations and apply experience and previously acquired skills to a new set of demands. Ms. Tovar earned a score of 117 (87th percentile) placed her within the high average range; with a 95% confidence interval, her true score likely falls between 110 and 122.    The Working Memory Index (WMI) provides a measure of the ability to attend to information, to hold briefly and process that information in memory, and to formulate a response. Ms. Tovar earned a score of 108 (70th percentile) placed her within the high average range; with a 95% confidence interval, her true score likely falls between 101 and 114    The Processing Speed Index (PSI) measures the ability to process visual information quickly, Ms. Tovar earned a score of 100 (50th percentile) placed her within the average range; with a 95% confidence interval, her true score likely falls between 92 and 108.    Index Standard Score Percentile Confidence Interval   Verbal Comprehension 125 95 118-130   Perceptual Reasoning 117 87 110-122   Working Memory 108 70 102-114   Processing  Speed 100 50    Intelligence Quotient 117 87 113-121   General Ability Index 124 95 118-128     Diagnostic Impressions:  Major Depressive Disorder, Recurrent, Mild   Other Anxiety Disorder  Ms. Tovar presented with symptoms of depression and anxiety. She outlined adverse childhood events. She described her relationship with her father as emotionally and physically distant. She stated that her relationship with her mother was good yet there were no boundaries between them which was inappropriate, and she had to provide her mother with social-emotional support. She asserted that mental health symptoms emerged during her youth. She recalled crying spells and feeling like no one liked her. She acknowledged symptoms of social anxiety yet denied excessive generalized worries. She stated that her mother and partner tried to support her, but she could not be consoled. She reported that mood symptoms ebbed and flowed throughout the years. She highlighted she can perform and get through despite symptoms being present. She indicated that mood symptoms worsened after megan COVID-19 with increased fatigue and decreased motivation. When administered the MMPI-II and symptoms screeners, scores were clinically significant.     Attention Deficit Hyperactivity Disorder  Ms. Tovar met criterion for ADHD. She recalled symptoms emerging at the age of 8 years old. She indicated she had difficulty starting and completing homework and managing their emotions. Ms. Tovar s Childhood Symptoms forms from the Grazyna Reports, as completed by her and her parents were mixed. She outlined clinically significant impairment in childhood from ADHD symptoms, but her parents did not. In adulthood, Ms. Tovar indicated she has been struggling to focus and complete tasks at home and work. She acknowledged that at work she is easily bored and distracted, had difficulty getting along with others, has trouble organizing and problems  learning new tasks/information. The results of the remaining Grazyna Reports as completed by only her also were mixed. She outlined clinically significant impairment from ADHD symptoms in the areas of current and executive functioning. When administered cognitive measures, the five items that are sensitive to ADHD symptoms fell in the average to the high average range.     Alcohol Use Disorder  History of Stimulant Use  Ms. Tovar outlined a problematic pattern of alcohol and cocaine use. She reported she consumed alcohol and smoked cannabis for the first time at the age of 13 years old. She indicated that at the age of 16 years old, she was drinking alcohol regularly with peers. 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 She highlighted that her mother expressed the belief that the illness kept her from becoming an alcoholic. She acknowledged she returned to drinking after treatment. She asserted that while residing in [place], her alcohol use increased. She reported that when she transferred to [place] her alcohol use decreased because she did not have as many friends. 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 She stated that throughout the years she has discontinued use for times. She highlighted that on 05/08/2022, she stopped drinking alcohol but two weeks ago, she drank 5 days in a row. She recognized she has been medicating something with alcohol. She disclosed she abused Adderall in college and from the ages of 26 to 34 years old, she abused cocaine. She reported that her use of cocaine in her mid-twenties occurred every workday.     Recommendations:    1. Ms. Tovar would likely benefit from continuing to engage in medication management services. They were recommended to follow their provider s treatment guidelines and recommendations. If  their diagnoses are beyond the scope of practice for their primary care provider, they should be recommended to a psychiatric provider.    2. Ms. Tovar would likely benefit from continuing to participate in individual therapy.     Psychological Testing Bill/Service Summary:  Interview/Assessment Date Time   Interview 6/22/2022; 6/29/2022; 7/19/[id number] (1); [phone] (1); [id number] (1) Previously billed   Documentation 6/22/2022; 6/29/2022; 7/19/2022 [id number] (.25); [phone] (.25); [id number] (.25) Previously billed   Testing Evaluation 16351- 1st 60 mins 87227- each add 60   Record Review & Clarify Referral Question     Clinical Decision Making     Patient Symptom Management     Battery Modification     Integration/ Report Generation 8/31/2022; 9/06/2022; 9/07/[id number] (.5); [phone] (1); [id number] (.25)   Feedback Session 9/09/[phone] (1)   Post-Service Work 9/09/2022 [id number] (.25)   Total Time 3    Total Units 1 Previously billed 2 Previously billed        Integration/ Report Generation     Feedback Session 9/30/[phone] (.5)   Post-Service Work 9/30/[phone] (.25)   Total Time     Total Units     Test Administration 63301- 1st 30 mins 76288- each add 30    Test Administration (Face) 9/28/2022 0151-9134 (1.5)     Scoring 9/28/2022 5870-6433 (.25)   Total Time 1.75    Total Units 1 3   Diagnoses MDD, Anxiety, ADHD

## 2022-09-30 NOTE — PROGRESS NOTES
"[facility]                                     Progress Note    Patient Name: Vidhi Tovar  Date: 2022         Service Type: Individual      Session Start Time: 804  Session End Time: 824     Session Length: 20    Session #: 6    Attendees: Client    Service Modality:  Video Visit:      Provider verified identity through the following two step process.  Patient provided:  Patient     Telemedicine Visit: The patient's condition can be safely assessed and treated via synchronous audio and visual telemedicine encounter.      Reason for Telemedicine Visit: Services only offered telehealth    Originating Site (Patient Location): Patient's home    Distant Site (Provider Location): Provider Remote Setting- Home Office    Consent:  The patient/guardian has verbally consented to: the potential risks and benefits of telemedicine (video visit) versus in person care; bill my insurance or make self-payment for services provided; and responsibility for payment of non-covered services.     Patient would like the video invitation sent by:  My Chart    Mode of Communication:  Video Conference via Amwell    As the provider I attest to compliance with applicable laws and regulations related to telemedicine.    DATA  Interactive Complexity: No  Crisis: No        Progress Since Last Session (Related to Symptoms / Goals / Homework):   Symptoms: No change stable    Homework: NOne      Episode of Care Goals: Satisfactory progress - PREPARATION (Decided to change - considering how); Intervened by negotiating a change plan and determining options / strategies for behavior change, identifying triggers, exploring social supports, and working towards setting a date to begin behavior change     Current / Ongoing Stressors and Concerns:   Ms. Tovar asserted she has been feeling \"better\" since the last visit. She expressed feeling like she \"kicked off some COVID webs.\" She acknowledged she did not want to " continue doing her job but she has been working from home more which has decreased stress. She added she is still riding a high from her vacation in [place] which changed her mindset.      Treatment Objective(s) Addressed in This Session:   Review findings of cognitive testing as part of ADHD Eval     Intervention:    During today's session, Ms. Tovar was provided with feedback regarding cognitive testing associated with the ADHD Evaluation. This writer reviewed the results of the WAIS and Trails; see charts below. When administered cognitive measures, four of five items sensitive to ADHD fell in the average to above average range including Complex Attention, Cognitive Flexibility, Processing Speed, Executive Function and Simple Attention. Her overall level of intellectual function was estimated to fall within the superior range, but her demonstrated clinically significant deficits in processing speed and working memory. When reviewing her CNS with the WAIS, her ability to pay attention is impaired and cannot be better accounted for by anxiety and depression alone. She met criterion for ADHD.    Assessments completed prior to visit:  WAIS-IV  Index Standard Score Percentile Confidence Interval   Verbal Compression 125 95 118-130   Perceptual Reasoning 117 87 110-122   Working Memory 108 70 102-114   Processing Speed 100 50    Intelligence Quotient 117 87 113-121   General Ability Index 124 95 118-128     CNS  Domain Standard Score Percentile Description Validity   Neurocognitive Index 101 53 Average Yes   Composite Memory Measure 90 25 Average Yes   Verbal Memory 82 12 Low average Yes   Visual Memory 101 53 Average Yes   Psychomotor Speed 107 68 Average Yes   Reaction Time 85 16 Low average Yes   *Complex Attention 114 82 Above average Yes   *Cognitive Flexibility 109 73 Average Yes   *Processing Speed 117 87 Above average Yes   *Executive Function 108 70 Average Yes   *Simple Attention 94 34 Average Yes    Motor Speed 98 45 Average Yes     PHQ9:   PHQ-9 SCORE 9/28/2022   PHQ-9 Total Score 6     GAD7:   DIONNE-7 SCORE 6/22/2022 6/22/2022 9/28/2022   Total Score - 5 (mild anxiety) -   Total Score 5 5 4         ASSESSMENT: Current Emotional / Mental Status (status of significant symptoms):   Risk status (Self / Other harm or suicidal ideation)   Patient denies current fears or concerns for personal safety.   Patient denies current or recent suicidal ideation or behaviors.   Patient denies current or recent homicidal ideation or behaviors.   Patient denies current or recent self injurious behavior or ideation.   Patient denies other safety concerns.   Patient reports there has been no change in risk factors since their last session.     Patient reports there has been no change in protective factors since their last session.     Recommended that patient call 911 or go to the local ED should there be a change in any of these risk factors.     Appearance:   Appropriate    Eye Contact:   Good    Psychomotor Behavior: Normal  Restless    Attitude:   Cooperative    Orientation:   All   Speech    Rate / Production: Talkative Normal     Volume:  Normal    Mood:    Anxious    Affect:    Appropriate    Thought Content:  Clear    Thought Form:  Coherent  Logical    Insight:    Good  and Fair      Medication Review:   No changes to current psychiatric medication(s)     Medication Compliance:   Yes     Changes in Health Issues:   None reported     Chemical Use Review:   Substance Use: Chemical use reviewed, no active concerns identified      Tobacco Use: No current tobacco use.      Diagnosis:  1. Mild episode of recurrent major depressive disorder (H)    2. Anxiety    3. Attention deficit hyperactivity disorder (ADHD), combined type        Major Depressive Disorder, Recurrent, Mild   Other Anxiety Disorder  Ms. Tovar presented with symptoms of depression and anxiety. She outlined adverse childhood events. She described her  relationship with her father as emotionally and physically distant. She stated that her relationship with her mother was good yet there were no boundaries between them which was inappropriate, and she had to provide her mother with social-emotional support. She asserted that mental health symptoms emerged during her youth. She recalled crying spells and feeling like no one liked her. She acknowledged symptoms of social anxiety yet denied excessive generalized worries. She stated that her mother and partner tried to support her, but she could not be consoled. She reported that mood symptoms ebbed and flowed throughout the years. She highlighted she can perform and get through despite symptoms being present. She indicated that mood symptoms worsened after megan COVID-19 with increased fatigue and decreased motivation. When administered the MMPI-II and symptoms screeners, scores were clinically significant.     Attention Deficit Hyperactivity Disorder  Ms. Tovar met criterion for ADHD. She recalled symptoms emerging at the age of 8 years old. She indicated she had difficulty starting and completing homework and managing their emotions. Ms. Tovar s Childhood Symptoms forms from the [place] Reports, as completed by her and her parents were mixed. She outlined clinically significant impairment in childhood from ADHD symptoms, but her parents did not. In adulthood, Ms. Tovar indicated she has been struggling to focus and complete tasks at home and work. She acknowledged that at work she is easily bored and distracted, had difficulty getting along with others, has trouble organizing and problems learning new tasks/information. The results of the remaining [place] Reports as completed by only her also were mixed. She outlined clinically significant impairment from ADHD symptoms in the areas of current and executive functioning. When administered cognitive measures, the five items that are sensitive to ADHD  symptoms fell in the average to the high average range.     Alcohol Use Disorder  History of Stimulant Use  Ms. Tovar outlined a problematic pattern of alcohol and cocaine use. She reported she consumed alcohol and smoked cannabis for the first time at the age of 13 years old. She indicated that at the age of 16 years old, she was drinking alcohol regularly with peers. 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 She highlighted that her mother expressed the belief that the illness kept her from becoming an alcoholic. She acknowledged she returned to drinking after treatment. She asserted that while residing in [place], her alcohol use increased. She reported that when she transferred to [place] her alcohol use decreased because she did not have as many friends. 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 She stated that throughout the years she has discontinued use for times. She highlighted that on 05/08/2022, she stopped drinking alcohol but two weeks ago, she drank 5 days in a row. She recognized she has been medicating something with alcohol. She disclosed she abused Adderall in college and from the ages of 26 to 34 years old, she abused cocaine. She reported that her use of cocaine in her mid-twenties occurred every workday.     Recommendations:    1. Ms. Tovar would likely benefit from continuing to engage in medication management services. They were recommended to follow their provider s treatment guidelines and recommendations. If their diagnoses are beyond the scope of practice for their primary care provider, they should be recommended to a psychiatric provider.    2. Ms. Tovar would likley benefit from continuing to participate in individual therapy.     Collateral Reports Completed:   Routed note to Care Team Member(s)    Bibiana MELTON  Dimas, PhD LP  September 30, 2022    Psychological Testing Bill/Service Summary:  Interview/Assessment Date Time   Interview 6/22/2022; 6/29/2022; 7/19/2022 [id number] (1); 706-800 (1); 2158-9355 (1) Previously billed   Documentation 6/22/2022; 6/29/2022; 7/19/2022 9156-9408 (.25); 800-815 (.25); 0429-6440 (.25) Previously billed   Testing Evaluation 42898- 1st 60 mins 63280- each add 60   Record Review & Clarify Referral Question     Clinical Decision Making     Patient Symptom Management     Battery Modification     Integration/ Report Generation 8/31/2022; 9/06/2022; 9/07/2022 2000-2030 (.5); 730-830 (1); 6556-9810 (.25)   Feedback Session 9/09/2022 709-802 (1)   Post-Service Work 9/09/2022 7475-5099 (.25)   Total Time 3    Total Units 1 Previously billed 2 Previously billed        Integration/ Report Generation     Feedback Session 9/30/2022 804-824 (.5)   Post-Service Work 9/30/2022 2924-4683 (.25)   Total Time 1    Total Units 1    Test Administration 73887- 1st 30 mins 92371- each add 30    Test Administration (Face) 9/28/2022 3510-9505 (1.5)     Scoring 9/28/2022 6211-6494 (.25)   Total Time 1.75    Total Units 1 3   Diagnoses MDD, Anxiety, ADHD

## 2022-10-02 ENCOUNTER — HEALTH MAINTENANCE LETTER (OUTPATIENT)
Age: 42
End: 2022-10-02

## 2022-10-05 ENCOUNTER — MYC MEDICAL ADVICE (OUTPATIENT)
Dept: FAMILY MEDICINE | Facility: CLINIC | Age: 42
End: 2022-10-05

## 2022-10-05 ENCOUNTER — OFFICE VISIT (OUTPATIENT)
Dept: FAMILY MEDICINE | Facility: CLINIC | Age: 42
End: 2022-10-05
Payer: COMMERCIAL

## 2022-10-05 VITALS
HEART RATE: 81 BPM | TEMPERATURE: 98.6 F | DIASTOLIC BLOOD PRESSURE: 84 MMHG | SYSTOLIC BLOOD PRESSURE: 130 MMHG | BODY MASS INDEX: 28.2 KG/M2 | WEIGHT: 179.7 LBS | HEIGHT: 67 IN | OXYGEN SATURATION: 94 %

## 2022-10-05 DIAGNOSIS — Z23 NEEDS FLU SHOT: ICD-10-CM

## 2022-10-05 DIAGNOSIS — F32.A DEPRESSION, UNSPECIFIED DEPRESSION TYPE: ICD-10-CM

## 2022-10-05 DIAGNOSIS — Z71.84 TRAVEL ADVICE ENCOUNTER: Primary | ICD-10-CM

## 2022-10-05 DIAGNOSIS — Z86.19 HISTORY OF COLD SORES: ICD-10-CM

## 2022-10-05 DIAGNOSIS — F41.9 ANXIETY: ICD-10-CM

## 2022-10-05 RX ORDER — ALPRAZOLAM 0.25 MG
0.25 TABLET ORAL 3 TIMES DAILY PRN
Qty: 10 TABLET | Refills: 0 | Status: SHIPPED | OUTPATIENT
Start: 2022-10-05 | End: 2022-12-14

## 2022-10-05 RX ORDER — VALACYCLOVIR HYDROCHLORIDE 1 G/1
2000 TABLET, FILM COATED ORAL 2 TIMES DAILY
Qty: 4 TABLET | Refills: 0 | Status: SHIPPED | OUTPATIENT
Start: 2022-10-05 | End: 2022-12-14

## 2022-10-05 NOTE — PATIENT INSTRUCTIONS
What can travelers do to prevent cholera?  Travelers can protect themselves against cholera, even travelers going to areas of active cholera transmission, by taking the following steps:    Choose food and drinks carefully    Only eat foods that are cooked and served hot  Avoid food that has been sitting on a buffet  Eat raw fruits and vegetables only if you have washed them in clean water or peeled them  Only drink beverages from factory-sealed containers  Avoid ice because it may have been made from unclean water  Drink pasteurized milk  Wash hands carefully    Wash hands often with soap and water for 20 seconds, especially after using the bathroom and before eating  If soap and water aren t available, use an alcohol-based hand  that contains at least 60% alcohol  Keep your hands away from your face and mouth  A licensed cholera vaccine called Vaxchora  (lyophilized -HgR, [employer]) is available in the [place] for adults 18-64 years old. It prevents severe diarrhea caused by the most common types of cholera-causing bacteria. Most [place] travelers don t get vaccinated because very few visit areas with active cholera.    Typhoid   Recommended for most travelers, especially those staying with friends or relatives or visiting smaller cities or rural areas.

## 2022-10-05 NOTE — NURSING NOTE
"ROOM:1  GISELE CHAN    Preferred Name: Mindy MARKHAM AGREED:  Mammogram    None    Covid and Flu     DECLINED:  Colonoscopy and Dexa Scan (Bone Density Screening)    HEPATITIS C SCREENING, HIV SCREENING, LIPID SCREEN, PAP SMEAR, MICROALBUMIN, CHLAMYDIA SCREENING, BMP and A1C    TDAP / TD, Hep B, Hep A, Pneumococcal  and HPV    42 year old  Chief Complaint   Patient presents with     Traveling, vaccinations whats needed     Finger     Little expelled spot on right pointer finger, at first was painful but now no pain     A.D.H.D     Refill Request       Blood pressure 130/84, pulse 81, temperature 98.6  F (37  C), temperature source Oral, height 1.702 m (5' 7\"), weight 81.5 kg (179 lb 11.2 oz), SpO2 94 %. Body mass index is 28.14 kg/m .  BP completed using cuff size:        There is no problem list on file for this patient.      Wt Readings from Last 2 Encounters:   10/05/22 81.5 kg (179 lb 11.2 oz)   07/06/22 80.7 kg (178 lb)     BP Readings from Last 3 Encounters:   10/05/22 130/84   07/06/22 110/73   06/13/22 110/72       No Known Allergies    Current Outpatient Medications   Medication     ALPRAZolam (XANAX) 0.25 MG tablet     valACYclovir HCl (VALTREX PO)     scopolamine (TRANSDERM-SCOP, 1.5 MG,) 1 MG/3DAYS 72 hr patch     No current facility-administered medications for this visit.       Social History     Tobacco Use     Smoking status: Former Smoker     Smokeless tobacco: Never Used   Vaping Use     Vaping Use: Never used   Substance Use Topics     Alcohol use: Not Currently     Comment: former     Drug use: Yes     Types: Marijuana, Psilocybin     Comment: occasional use       Honoring Choices - Health Care Directive Guide offered to patient at time of visit.    Health Maintenance Due   Topic Date Due     ADVANCE CARE PLANNING  Never done     DEPRESSION ACTION PLAN  Never done     COVID-19 Vaccine (4 - Booster for Pfizer series) 06/03/2022     INFLUENZA VACCINE (1) 09/01/2022       Immunization History "   Administered Date(s) Administered     COVID-19,PF,Pfizer 12+ Yrs (2022 and After) 04/08/2022     Influenza Vaccine, 6+MO IM (QUADRIVALENT W/PRESERVATIVES) 11/01/2019, 10/13/2020     TDAP Vaccine (Boostrix) 06/17/2019       No results found for: PAP    Recent Labs   Lab Test 06/21/22  0807 05/19/21  0000   A1C 5.6  --    *  --    HDL 41*  --    TRIG 268* 196*   CR 0.81  --    GFRESTIMATED >90  --    POTASSIUM 4.1  --    TSH 3.23  --        PHQ-2 ( 1999 Pfizer) 7/19/2022 6/28/2022   Q1: Little interest or pleasure in doing things 1 1   Q2: Feeling down, depressed or hopeless 1 1   PHQ-2 Score 2 2   Q1: Little interest or pleasure in doing things Several days Several days   Q2: Feeling down, depressed or hopeless Several days Several days   PHQ-2 Score 2 2       PHQ-9 SCORE 9/28/2022   PHQ-9 Total Score 6       DIONNE-7 SCORE 6/22/2022 6/22/2022 9/28/2022   Total Score - 5 (mild anxiety) -   Total Score 5 5 4       No flowsheet data found.    Renetta Mena    October 5, 2022 10:36 AM

## 2022-10-05 NOTE — PROGRESS NOTES
Assessment & Plan   Vidhi was seen today for traveling, vaccinations whats needed, finger, a.d.h.d and refill request.    Diagnoses and all orders for this visit:    Travel advice encounter  -Reviewed CDC's travel recommendations for [facility] with the patient. She is up to date with routine vaccinations, including hepatitis A & B. She is due for a flu shot and COVID-19 booster. She receive flu shot today, and will go to pharmacy to get COVID-19 booster. Discussed options for prevention of malaria and Typhoid, including different options, regimens and potentially side effects. Sent prescriptions to her pharmacy. Discussed antibiotics are generally not needed for traveler's diarrhea unless symptoms are severe. Recommend patient bring oral rehydration packets to add to water- dehydration prevention is most important in the setting of diarrhea. Discussed use of insect repellant.    Needs flu shot  -     INFLUENZA VACCINE IM > 6 MONTHS VALENT IIV4 (AFLURIA)  -     typhoid (VIVOTIF) CR capsule; Take 1 capsule by mouth every other day for 4 doses One capsule on alternate days (day 1, 3, 5, and 7) for a total of 4 doses; all doses should be complete at least 1 week prior to potential exposure.    History of cold sores  -     valACYclovir (VALTREX) 1000 mg tablet; Take 2 tablets (2,000 mg) by mouth 2 times daily  As needed for cold sores    Depression, unspecified depression type  Anxiety  -     Adult Mental Health  Referral; Future  -     ALPRAZolam (XANAX) 0.25 MG tablet; Take 1 tablet (0.25 mg) by mouth 3 times daily as needed for anxiety  -Discussed it would be helpful for her to have a visit with psychiatry to initiate a new medication regimen, and primary care can likely refill her prescriptions once a regimen has been established.  -Reviewed PDMP from the last year. Pt has filled one rx of xanax 10 tablets in the last year. Pt uses the medication appropriately. Reasonable to refill.     RTC CANDI HUGHES  "Shelby, NP   ______________________________________    Subjective   Mindy is a 42 year old patient here today for Traveling, vaccinations whats needed; Finger (Little expelled spot on right pointer finger, at first was painful but now no pain); A.D.H.D; and Refill Request    Travel to [facility]  -Going on an 8 day safari and visiting [facility]  -Staying in resorts  -Traveled internationally before to [place] and [place]  -Gotten vaccines and prophylactic travel meds before  -Wondering if she needs a prophylactic rx in case of traveler's diarrhea      Right Finger concern  -Believe she got a sliver from a prickly pair  -Initially it started out as a clear [place]  -Then it bleed and expelled some fluid after being in a hot tub  -Showered and she thinks the sliver was expelled  -No fever or chills      Mental health  Recently had a psychologist diagnose her with Anxiety, depression, and ADHD  Interested in starting medication to treat these conditions  Would like a refill of xanax. Gets an rx of 10 tablets for PRN use a couple times per year. Sometimes has flight anxiety or panic attacks. Reduces her anxiety to know she has it if needed.  No SI/HI    Medical, surgical, family and social histories reviewed and updated as indicated.   Medications and allergies reviewed and updated as indicated.     ROS  Review Of Systems  See HPI    General Physical Exam:  Vitals: /84   Pulse 81   Temp 98.6  F (37  C) (Oral)   Ht 1.702 m (5' 7\")   Wt 81.5 kg (179 lb 11.2 oz)   SpO2 94%   BMI 28.14 kg/m       Physical Exam  Vitals and nursing note reviewed.   Constitutional:       General: She is not in acute distress.     Appearance: Normal appearance. She is not ill-appearing.   HENT:      Head: Normocephalic and atraumatic.   Eyes:      General: Lids are normal.      Conjunctiva/sclera: Conjunctivae normal.   Pulmonary:      Effort: Pulmonary effort is normal.   Musculoskeletal:         General: Normal range of motion. "      Right hand: Normal. No swelling, tenderness or bony tenderness. Normal range of motion.      Comments: Right 2nd finger: 1mm healing papule. No drainage, exudate, warmth or erythema   Skin:     Comments: Skin intact with no visible lesions.   Neurological:      General: No focal deficit present.      Mental Status: She is alert and oriented to person, place, and time.      Gait: Gait is intact.   Psychiatric:         Mood and Affect: Mood normal.         Behavior: Behavior normal.         Thought Content: Thought content normal.         Judgment: Judgment normal.

## 2022-10-06 RX ORDER — ATOVAQUONE AND PROGUANIL HYDROCHLORIDE 250; 100 MG/1; MG/1
1 TABLET, FILM COATED ORAL DAILY
Qty: 28 TABLET | Refills: 0 | Status: SHIPPED | OUTPATIENT
Start: 2022-10-06 | End: 2022-12-07

## 2022-10-13 ENCOUNTER — TELEPHONE (OUTPATIENT)
Dept: PSYCHOLOGY | Facility: CLINIC | Age: 42
End: 2022-10-13

## 2022-10-13 NOTE — TELEPHONE ENCOUNTER
Provider called patient to see if she was interested in scheduling an appointment sooner than her next scheduled session. Patient reports she will be unavailable for the sessions that the provider was able to offer.

## 2022-10-31 ENCOUNTER — VIRTUAL VISIT (OUTPATIENT)
Dept: PSYCHOLOGY | Facility: CLINIC | Age: 42
End: 2022-10-31
Payer: COMMERCIAL

## 2022-10-31 DIAGNOSIS — F33.0 MILD EPISODE OF RECURRENT MAJOR DEPRESSIVE DISORDER (H): Primary | ICD-10-CM

## 2022-10-31 DIAGNOSIS — F90.2 ATTENTION DEFICIT HYPERACTIVITY DISORDER (ADHD), COMBINED TYPE: ICD-10-CM

## 2022-10-31 DIAGNOSIS — F41.8 OTHER SPECIFIED ANXIETY DISORDERS: ICD-10-CM

## 2022-10-31 PROCEDURE — 90834 PSYTX W PT 45 MINUTES: CPT | Mod: 95

## 2022-10-31 ASSESSMENT — COLUMBIA-SUICIDE SEVERITY RATING SCALE - C-SSRS
TOTAL  NUMBER OF ABORTED OR SELF INTERRUPTED ATTEMPTS SINCE LAST CONTACT: NO
ATTEMPT SINCE LAST CONTACT: NO
SUICIDE, SINCE LAST CONTACT: NO
6. HAVE YOU EVER DONE ANYTHING, STARTED TO DO ANYTHING, OR PREPARED TO DO ANYTHING TO END YOUR LIFE?: NO
1. SINCE LAST CONTACT, HAVE YOU WISHED YOU WERE DEAD OR WISHED YOU COULD GO TO SLEEP AND NOT WAKE UP?: NO
2. HAVE YOU ACTUALLY HAD ANY THOUGHTS OF KILLING YOURSELF?: NO
TOTAL  NUMBER OF INTERRUPTED ATTEMPTS SINCE LAST CONTACT: NO

## 2022-10-31 ASSESSMENT — ANXIETY QUESTIONNAIRES
4. TROUBLE RELAXING: MORE THAN HALF THE DAYS
1. FEELING NERVOUS, ANXIOUS, OR ON EDGE: MORE THAN HALF THE DAYS
5. BEING SO RESTLESS THAT IT IS HARD TO SIT STILL: SEVERAL DAYS
6. BECOMING EASILY ANNOYED OR IRRITABLE: NEARLY EVERY DAY
3. WORRYING TOO MUCH ABOUT DIFFERENT THINGS: SEVERAL DAYS
IF YOU CHECKED OFF ANY PROBLEMS ON THIS QUESTIONNAIRE, HOW DIFFICULT HAVE THESE PROBLEMS MADE IT FOR YOU TO DO YOUR WORK, TAKE CARE OF THINGS AT HOME, OR GET ALONG WITH OTHER PEOPLE: VERY DIFFICULT
7. FEELING AFRAID AS IF SOMETHING AWFUL MIGHT HAPPEN: NEARLY EVERY DAY
GAD7 TOTAL SCORE: 13
2. NOT BEING ABLE TO STOP OR CONTROL WORRYING: SEVERAL DAYS
GAD7 TOTAL SCORE: 13

## 2022-10-31 ASSESSMENT — PATIENT HEALTH QUESTIONNAIRE - PHQ9: SUM OF ALL RESPONSES TO PHQ QUESTIONS 1-9: 15

## 2022-10-31 NOTE — PROGRESS NOTES
[facility]                                     Progress Note    Patient Name: Vidhi Tovar  Date: 10/31/2022         Service Type: Individual      Session Start Time: 2:30 PM  Session End Time: 3:19 PM     Session Length: 38-52 min    Session #: 1    Attendees: Client attended alone    Service Modality:  Video Visit:      Provider verified identity through the following two step process.  Patient provided:  Patient was verified at admission/transfer    Telemedicine Visit: The patient's condition can be safely assessed and treated via synchronous audio and visual telemedicine encounter.      Reason for Telemedicine Visit: Patient has requested telehealth visit    Originating Site (Patient Location): Patient's home    Distant Site (Provider Location): Provider Remote Setting- Home Office    Consent:  The patient/guardian has verbally consented to: the potential risks and benefits of telemedicine (video visit) versus in person care; bill my insurance or make self-payment for services provided; and responsibility for payment of non-covered services.     Patient would like the video invitation sent by:  My Chart    Mode of Communication:  Video Conference via [facility]    Distant Location (Provider):  Off-site    As the provider I attest to compliance with applicable laws and regulations related to telemedicine.    DATA  Interactive Complexity: No  Crisis: No        Progress Since Last Session (Related to Symptoms / Goals / Homework):   Symptoms: This was the patient's first session. Patient endorses symptoms of little interest in doing things, feeling hopeless, sleep disturbance, feeling tired, overeating, low self-worth, difficulty concentrating, feeling anxious, difficulty controlling worry, worrying about different things, difficulty relaxing, restlessness, irritability, and feeling afraid something awful might happen.     Homework: This was the patient's first session.       Episode of Care  "Goals: This was the patient's first session.      Current / Ongoing Stressors and Concerns:   Patient reports she is stressed about work. Patient reports she is concerned about her parents and them aging. Patient reports she is concerned about \"the world\".      Treatment Objective(s) Addressed in This Session:   This was the patient's first session.      Intervention:   Motivational Interviewing: client-centered interview focusing on assessing the stages of change. Built rapport with patient.     Assessments completed prior to visit:  The following assessments were completed by patient for this visit:  PHQ9:   PHQ-9 SCORE 9/28/2022 10/31/2022   PHQ-9 Total Score 6 15     GAD7:   DIONNE-7 SCORE 6/22/2022 6/22/2022 9/28/2022 10/31/2022   Total Score - 5 (mild anxiety) - -   Total Score 5 5 4 13     PROMIS 10-Global Health (only subscores and total score):   PROMIS-10 Scores Only 6/28/2022 7/19/2022 10/31/2022   Global Mental Health Score 11 11 13   Global Physical Health Score 15 16 14   PROMIS TOTAL - SUBSCORES 26 27 27         ASSESSMENT: Current Emotional / Mental Status (status of significant symptoms):   Risk status (Self / Other harm or suicidal ideation)   Patient denies current fears or concerns for personal safety.   Patient denies current or recent suicidal ideation or behaviors.   Patient denies current or recent homicidal ideation or behaviors.   Patient denies current or recent self injurious behavior or ideation.   Patient denies other safety concerns.   Patient reports there has been no change in risk factors since their last session.     Patient reports there has been no change in protective factors since their last session.     Recommended that patient call 911 or go to the local ED should there be a change in any of these risk factors.     Appearance:   Appropriate    Eye Contact:   Good    Psychomotor Behavior: Normal    Attitude:   Cooperative    Orientation:   All   Speech    Rate / " Production: Hyperverbal     Volume:  Normal    Mood:    Anxious  Depressed    Affect:    Worrisome    Thought Content:  Rumination    Thought Form:  Coherent    Insight:    Good      Medication Review:   No changes to current psychiatric medication(s)     Medication Compliance:   Yes     Changes in Health Issues:   None reported     Chemical Use Review:   Substance Use: increase in alcohol .  Patient reports frequency of use daily.  Provided encouragement towards sobriety        Tobacco Use: No current tobacco use.      Diagnosis:  1. Mild episode of recurrent major depressive disorder (H)    2. Attention deficit hyperactivity disorder (ADHD), combined type    3. Other specified anxiety disorders        Collateral Reports Completed:   Not Applicable    PLAN: (Patient Tasks / Therapist Tasks / Other)  Patient will think of attainable therapeutic goals related to overarching goals identified in session.  We will discuss next session.         TAMY York [facility] 10/31/2022  Service Performed and Documented by [facility]-   Note reviewed and clinical supervision by TAMY Pham [facility] 10/31/2022

## 2022-11-07 ENCOUNTER — TELEPHONE (OUTPATIENT)
Dept: PSYCHOLOGY | Facility: CLINIC | Age: 42
End: 2022-11-07

## 2022-11-07 NOTE — TELEPHONE ENCOUNTER
Provider called patient to see if she would be interested in an available session at 10 AM on Monday, November 7th.

## 2022-11-08 ENCOUNTER — TELEPHONE (OUTPATIENT)
Dept: PSYCHOLOGY | Facility: CLINIC | Age: 42
End: 2022-11-08

## 2022-11-08 NOTE — TELEPHONE ENCOUNTER
Provider called patient to offer an open session on 11/9 at 1:30 PM. Provider gave patient the phone number to behavioral access to schedule.

## 2022-11-09 ENCOUNTER — VIRTUAL VISIT (OUTPATIENT)
Dept: PSYCHOLOGY | Facility: CLINIC | Age: 42
End: 2022-11-09
Payer: COMMERCIAL

## 2022-11-09 ENCOUNTER — MYC MEDICAL ADVICE (OUTPATIENT)
Dept: FAMILY MEDICINE | Facility: CLINIC | Age: 42
End: 2022-11-09

## 2022-11-09 DIAGNOSIS — F41.8 OTHER SPECIFIED ANXIETY DISORDERS: ICD-10-CM

## 2022-11-09 DIAGNOSIS — F90.2 ATTENTION DEFICIT HYPERACTIVITY DISORDER (ADHD), COMBINED TYPE: Primary | ICD-10-CM

## 2022-11-09 DIAGNOSIS — F33.0 MILD EPISODE OF RECURRENT MAJOR DEPRESSIVE DISORDER (H): ICD-10-CM

## 2022-11-09 PROCEDURE — 90834 PSYTX W PT 45 MINUTES: CPT | Mod: 95

## 2022-11-09 NOTE — PROGRESS NOTES
[facility]                                     Progress Note    Patient Name: Vidhi Tovar  Date: 11/9/2022         Service Type: Individual      Session Start Time: 1:31 PM  Session End Time: 2:18 PM     Session Length: 38-52 min    Session #: 2    Attendees: Client attended alone    Service Modality:  Video Visit:      Provider verified identity through the following two step process.  Patient provided:  Patient is known previously to provider    Telemedicine Visit: The patient's condition can be safely assessed and treated via synchronous audio and visual telemedicine encounter.      Reason for Telemedicine Visit: Patient has requested telehealth visit    Originating Site (Patient Location): Patient's home    Distant Site (Provider Location): Provider Remote Setting- Home Office    Consent:  The patient/guardian has verbally consented to: the potential risks and benefits of telemedicine (video visit) versus in person care; bill my insurance or make self-payment for services provided; and responsibility for payment of non-covered services.     Patient would like the video invitation sent by:  My Chart    Mode of Communication:  Video Conference via [facility]    Distant Location (Provider):  Off-site    As the provider I attest to compliance with applicable laws and regulations related to telemedicine.    DATA  Interactive Complexity: No  Crisis: No        Progress Since Last Session (Related to Symptoms / Goals / Homework):   Symptoms: No change based on patient self-report. Patient endorses symptoms of little interest in doing things, feeling hopeless, sleep disturbance, feeling tired, overeating, low self-worth, difficulty concentrating, feeling anxious, difficulty controlling worry, worrying about different things, difficulty relaxing, restlessness, irritability, and feeling afraid something awful might happen.     Homework: Partially completed      Episode of Care Goals: Satisfactory  "progress - PREPARATION (Decided to change - considering how); Intervened by negotiating a change plan and determining options / strategies for behavior change, identifying triggers, exploring social supports, and working towards setting a date to begin behavior change     Current / Ongoing Stressors and Concerns:   Patient reports she is stressed about work. Patient reports she is concerned about her parents and them aging. Patient reports she is concerned about \"the world\". Patient reports she is concerned about her interpersonal relationships. Patient reports she is concerned about organization skills.      Treatment Objective(s) Addressed in This Session:   Patient and provider collaborated to create a treatment plan this session.      Intervention:   Motivational Interviewing: client-centered interview focusing on assessing the stages of change. Built rapport with patient.     Assessments completed prior to visit:  The following assessments were completed by patient for this visit:  PHQ9:   PHQ-9 SCORE 9/28/2022 10/31/2022   PHQ-9 Total Score 6 15     GAD7:   DIONNE-7 SCORE 6/22/2022 6/22/2022 9/28/2022 10/31/2022   Total Score - 5 (mild anxiety) - -   Total Score 5 5 4 13     PROMIS 10-Global Health (only subscores and total score):   PROMIS-10 Scores Only 6/28/2022 7/19/2022 10/31/2022   Global Mental Health Score 11 11 13   Global Physical Health Score 15 16 14   PROMIS TOTAL - SUBSCORES 26 27 27         ASSESSMENT: Current Emotional / Mental Status (status of significant symptoms):   Risk status (Self / Other harm or suicidal ideation)   Patient denies current fears or concerns for personal safety.   Patient denies current or recent suicidal ideation or behaviors.   Patient denies current or recent homicidal ideation or behaviors.   Patient denies current or recent self injurious behavior or ideation.   Patient denies other safety concerns.   Patient reports there has been no change in risk factors since their " last session.     Patient reports there has been no change in protective factors since their last session.     Recommended that patient call 911 or go to the local ED should there be a change in any of these risk factors.     Appearance:   Appropriate    Eye Contact:   Good    Psychomotor Behavior: Normal    Attitude:   Cooperative    Orientation:   All   Speech    Rate / Production: Hyperverbal     Volume:  Normal    Mood:    Anxious  Depressed    Affect:    Worrisome    Thought Content:  Rumination    Thought Form:  Coherent    Insight:    Good      Medication Review:   No changes to current psychiatric medication(s)     Medication Compliance:   Yes     Changes in Health Issues:   None reported     Chemical Use Review:   Substance Use: Problem use continues with no change since last session, Not addressed in session        Tobacco Use: No current tobacco use.      Diagnosis:  1. Attention deficit hyperactivity disorder (ADHD), combined type    2. Mild episode of recurrent major depressive disorder (H)    3. Other specified anxiety disorders        Collateral Reports Completed:   Not Applicable    PLAN: (Patient Tasks / Therapist Tasks / Other)  Patient will create a list of coping skills that she is currently using.  We will discuss next session.         TAMY York [facility] 11/9/2022  Service Performed and Documented by [facility]-   Note reviewed and clinical supervision by TAMY Pham [facility] 11/9/2022    _____________________________________________________                                              Individual Treatment Plan    Patient's Name: Vidhi Tovar  YOB: 1980    Date of Creation: 11/9/2022  Date Treatment Plan Last Reviewed/Revised: 11/9/2022    DSM5 Diagnoses: Attention-Deficit/Hyperactivity Disorder  314.01 (F90.2) Combined presentation, 296.31 (F33.0) Major Depressive Disorder, Recurrent Episode, Mild _ or 300.09 (F41.8) Other Specified Anxiety Disorder   Psychosocial /  Contextual Factors: Patient is self-employed. Patient is single. Patient is taking care of her elderly parents.   PROMIS (reviewed every 90 days):   The following assessments were completed by patient for this visit:  PROMIS 10-Global Health (only subscores and total score):   PROMIS-10 Scores Only 6/28/2022 7/19/2022 10/31/2022   Global Mental Health Score 11 11 13   Global Physical Health Score 15 16 14   PROMIS TOTAL - SUBSCORES 26 27 27       Referral / Collaboration:  Referral to another professional/service is not indicated at this time..    Anticipated number of session for this episode of care: 9-12 sessions  Anticipation frequency of session: Weekly  Anticipated Duration of each session: 38-52 minutes  Treatment plan will be reviewed in 90 days or when goals have been changed.       MeasurableTreatment Goal(s) related to diagnosis / functional impairment(s)  Goal 1:Patient will work on stabilizing symptoms of ADHD.      Objective #A  Patient will learn about the diagnosis and symptoms of ADHD.   Status: New - Date: 11/9/2022     Intervention(s)  Therapist will teach about the diagnosis of ADHD.     Objective #B  Patient will learn 3 skills to improve organization.                        Status: New - Date: 11/9/2022     Intervention(s)  Therapist will teach organizational skills.      Objective #C   Patient will learn how ADHD can impact interpersonal relationships and social skills.   Status: New - Date: 11/9/2022     Intervention(s)  Therapist will teach about the symptoms of ADHD and how a neuro-divergent brain tries to build social connections.        Goal 2: Client will work on stabilizing anxiety symptoms as evidenced by DIONNE-7 scores (current is 13) and Self report.  Goal is to reduce by five points.      Objective #A Client will identify triggers of anxiety and process them in session.    Status: New - Date: 11/9/2022    Intervention(s)  Therapist will teach emotional recognition/identification by  assigning homework to journal with written exercises.    Objective #B  Client will identify initial signs or symptoms of anxiety.    Status: New - Date: 11/9/2022    Intervention(s)  Therapist will teach emotional regulation skills, such as deep breathing and mindfulness skills.    Objective #C  Client will learn about co-morbidities between ADHD and anxiety disorders.   Status: New - Date: 11/9/2022    Intervention(s)  Therapist will provide educational materials on anxiety disorders and ADHD.          Patient has reviewed and agreed to the above plan.      TAMY York  November 9, 2022  Service Performed and Documented by KEYSHA velasco plan reviewed and clinical supervision by TAMY Pham 11/9/2022

## 2022-11-14 ENCOUNTER — VIRTUAL VISIT (OUTPATIENT)
Dept: PSYCHOLOGY | Facility: CLINIC | Age: 42
End: 2022-11-14
Payer: COMMERCIAL

## 2022-11-14 DIAGNOSIS — F33.0 MILD EPISODE OF RECURRENT MAJOR DEPRESSIVE DISORDER (H): ICD-10-CM

## 2022-11-14 DIAGNOSIS — F41.8 OTHER SPECIFIED ANXIETY DISORDERS: ICD-10-CM

## 2022-11-14 DIAGNOSIS — F90.2 ATTENTION DEFICIT HYPERACTIVITY DISORDER (ADHD), COMBINED TYPE: Primary | ICD-10-CM

## 2022-11-14 PROCEDURE — 90834 PSYTX W PT 45 MINUTES: CPT | Mod: 95

## 2022-11-14 NOTE — PROGRESS NOTES
[facility]                                     Progress Note    Patient Name: Vidhi Tovar  Date: 11/14/2022         Service Type: Individual      Session Start Time: 11:00 AM  Session End Time: 11:48 AM     Session Length: 38-52 min    Session #: 3    Attendees: Client attended alone    Service Modality:  Video Visit:      Provider verified identity through the following two step process.  Patient provided:  Patient is known previously to provider    Telemedicine Visit: The patient's condition can be safely assessed and treated via synchronous audio and visual telemedicine encounter.      Reason for Telemedicine Visit: Patient has requested telehealth visit    Originating Site (Patient Location): Patient's home    Distant Site (Provider Location): Provider Remote Setting- Home Office    Consent:  The patient/guardian has verbally consented to: the potential risks and benefits of telemedicine (video visit) versus in person care; bill my insurance or make self-payment for services provided; and responsibility for payment of non-covered services.     Patient would like the video invitation sent by:  My Chart    Mode of Communication:  Video Conference [facility]    Distant Location (Provider):  Off-site    As the provider I attest to compliance with applicable laws and regulations related to telemedicine.    DATA  Interactive Complexity: No  Crisis: No        Progress Since Last Session (Related to Symptoms / Goals / Homework):   Symptoms: No change based on patient self-report. Patient endorses symptoms of little interest in doing things, feeling hopeless, sleep disturbance, feeling tired, overeating, low self-worth, difficulty concentrating, feeling anxious, difficulty controlling worry, worrying about different things, difficulty relaxing, restlessness, irritability, and feeling afraid something awful might happen.     Homework: Partially completed      Episode of Care Goals: Satisfactory  "progress - PREPARATION (Decided to change - considering how); Intervened by negotiating a change plan and determining options / strategies for behavior change, identifying triggers, exploring social supports, and working towards setting a date to begin behavior change     Current / Ongoing Stressors and Concerns:   Patient reports she is stressed about work. Patient reports she is concerned about her parents and them aging. Patient reports she is concerned about \"the world\". Patient reports she is concerned about her interpersonal relationships. Patient reports she is concerned about organization skills. Patient reports she is concerned about how her ADHD impacts social relationships.      Treatment Objective(s) Addressed in This Session:    Patient will learn about the diagnosis and symptoms of ADHD.     Patient will learn how ADHD can impact interpersonal relationships and social skills.    Client will learn about co-morbidities between ADHD and anxiety disorders.      Intervention:   Motivational Interviewing: supported patient in processing and exploring her list of coping skills that she is currently using. Validated patient's use of skills. Discussed symptoms of ADHD of forgetfulness and disorganization. Discussed how ADHD is a processing disorder. Discussed having symptoms of anxiety related to symptoms of ADHD, like forgetfulness. Supported patient in exploring feelings of rejection from others. Discussed how ADHD can impact social relationships and rejection sensitivity.     Assessments completed prior to visit:  The following assessments were completed by patient for this visit:  PHQ9:   PHQ-9 SCORE 9/28/2022 10/31/2022   PHQ-9 Total Score 6 15     GAD7:   DIONNE-7 SCORE 6/22/2022 6/22/2022 9/28/2022 10/31/2022   Total Score - 5 (mild anxiety) - -   Total Score 5 5 4 13     PROMIS 10-Global Health (only subscores and total score):   PROMIS-10 Scores Only 6/28/2022 7/19/2022 10/31/2022   Global Mental Health " Score 11 11 13   Global Physical Health Score 15 16 14   PROMIS TOTAL - SUBSCORES 26 27 27         ASSESSMENT: Current Emotional / Mental Status (status of significant symptoms):   Risk status (Self / Other harm or suicidal ideation)   Patient denies current fears or concerns for personal safety.   Patient denies current or recent suicidal ideation or behaviors.   Patient denies current or recent homicidal ideation or behaviors.   Patient denies current or recent self injurious behavior or ideation.   Patient denies other safety concerns.   Patient reports there has been no change in risk factors since their last session.     Patient reports there has been no change in protective factors since their last session.     Recommended that patient call 911 or go to the local ED should there be a change in any of these risk factors.     Appearance:   Appropriate    Eye Contact:   Good    Psychomotor Behavior: Normal    Attitude:   Cooperative    Orientation:   All   Speech    Rate / Production: Hyperverbal     Volume:  Normal    Mood:    Anxious  Depressed    Affect:    Worrisome    Thought Content:  Rumination    Thought Form:  Coherent    Insight:    Good      Medication Review:   No changes to current psychiatric medication(s)     Medication Compliance:   Yes     Changes in Health Issues:   None reported     Chemical Use Review:   Substance Use: Problem use continues with no change since last session, Not addressed in session        Tobacco Use: No current tobacco use.      Diagnosis:  1. Attention deficit hyperactivity disorder (ADHD), combined type    2. Mild episode of recurrent major depressive disorder (H)    3. Other specified anxiety disorders        Collateral Reports Completed:   Not Applicable    PLAN: (Patient Tasks / Therapist Tasks / Other)  Patient will add to her list of coping skills that she is currently using to cope with symptoms of ADHD.   We will discuss next session.         TAMY York  [facility] 11/14/2022  Service Performed and Documented by [facility]-   Note reviewed and clinical supervision by TAMY Pham [facility] 11/14/2022    _____________________________________________________                                              Individual Treatment Plan    Patient's Name: Vidhi Tovar  YOB: 1980    Date of Creation: 11/9/2022  Date Treatment Plan Last Reviewed/Revised: 11/9/2022    DSM5 Diagnoses: Attention-Deficit/Hyperactivity Disorder  314.01 (F90.2) Combined presentation, 296.31 (F33.0) Major Depressive Disorder, Recurrent Episode, Mild _ or 300.09 (F41.8) Other Specified Anxiety Disorder   Psychosocial / Contextual Factors: Patient is self-employed. Patient is single. Patient is taking care of her elderly parents.   PROMIS (reviewed every 90 days):   The following assessments were completed by patient for this visit:  PROMIS 10-Global Health (only subscores and total score):   PROMIS-10 Scores Only 6/28/2022 7/19/2022 10/31/2022   Global Mental Health Score 11 11 13   Global Physical Health Score 15 16 14   PROMIS TOTAL - SUBSCORES 26 27 27       Referral / Collaboration:  Referral to another professional/service is not indicated at this time..    Anticipated number of session for this episode of care: 9-12 sessions  Anticipation frequency of session: Weekly  Anticipated Duration of each session: 38-52 minutes  Treatment plan will be reviewed in 90 days or when goals have been changed.       MeasurableTreatment Goal(s) related to diagnosis / functional impairment(s)  Goal 1:Patient will work on stabilizing symptoms of ADHD.      Objective #A  Patient will learn about the diagnosis and symptoms of ADHD.   Status: New - Date: 11/9/2022     Intervention(s)  Therapist will teach about the diagnosis of ADHD.     Objective #B  Patient will learn 3 skills to improve organization.                        Status: New - Date: 11/9/2022     Intervention(s)  Therapist will teach  organizational skills.      Objective #C   Patient will learn how ADHD can impact interpersonal relationships and social skills.   Status: New - Date: 11/9/2022     Intervention(s)  Therapist will teach about the symptoms of ADHD and how a neuro-divergent brain tries to build social connections.        Goal 2: Client will work on stabilizing anxiety symptoms as evidenced by DIONNE-7 scores (current is 13) and Self report.  Goal is to reduce by five points.      Objective #A Client will identify triggers of anxiety and process them in session.    Status: New - Date: 11/9/2022    Intervention(s)  Therapist will teach emotional recognition/identification by assigning homework to journal with written exercises.    Objective #B  Client will identify initial signs or symptoms of anxiety.    Status: New - Date: 11/9/2022    Intervention(s)  Therapist will teach emotional regulation skills, such as deep breathing and mindfulness skills.    Objective #C  Client will learn about co-morbidities between ADHD and anxiety disorders.   Status: New - Date: 11/9/2022    Intervention(s)  Therapist will provide educational materials on anxiety disorders and ADHD.          Patient has reviewed and agreed to the above plan.      TAMY York [facility]  November 9, 2022  Service Performed and Documented by KEYSHA velasco plan reviewed and clinical supervision by TAMY Pham 11/9/2022

## 2022-11-30 ENCOUNTER — TELEPHONE (OUTPATIENT)
Dept: PSYCHOLOGY | Facility: CLINIC | Age: 42
End: 2022-11-30

## 2022-11-30 NOTE — TELEPHONE ENCOUNTER
Provider called patient to offer her an open session on Friday 12/2 at 10 AM. Provider gave patient the phone number for behavioral access to schedule and reminded her she can schedule through [employer] missy as well.

## 2022-12-02 ENCOUNTER — VIRTUAL VISIT (OUTPATIENT)
Dept: PSYCHOLOGY | Facility: CLINIC | Age: 42
End: 2022-12-02
Payer: COMMERCIAL

## 2022-12-02 DIAGNOSIS — F90.2 ATTENTION DEFICIT HYPERACTIVITY DISORDER (ADHD), COMBINED TYPE: Primary | ICD-10-CM

## 2022-12-02 DIAGNOSIS — F41.8 OTHER SPECIFIED ANXIETY DISORDERS: ICD-10-CM

## 2022-12-02 DIAGNOSIS — F33.0 MILD EPISODE OF RECURRENT MAJOR DEPRESSIVE DISORDER (H): ICD-10-CM

## 2022-12-02 PROCEDURE — 90834 PSYTX W PT 45 MINUTES: CPT | Mod: 95

## 2022-12-02 ASSESSMENT — ANXIETY QUESTIONNAIRES
5. BEING SO RESTLESS THAT IT IS HARD TO SIT STILL: NOT AT ALL
IF YOU CHECKED OFF ANY PROBLEMS ON THIS QUESTIONNAIRE, HOW DIFFICULT HAVE THESE PROBLEMS MADE IT FOR YOU TO DO YOUR WORK, TAKE CARE OF THINGS AT HOME, OR GET ALONG WITH OTHER PEOPLE: SOMEWHAT DIFFICULT
4. TROUBLE RELAXING: MORE THAN HALF THE DAYS
3. WORRYING TOO MUCH ABOUT DIFFERENT THINGS: SEVERAL DAYS
GAD7 TOTAL SCORE: 6
2. NOT BEING ABLE TO STOP OR CONTROL WORRYING: SEVERAL DAYS
1. FEELING NERVOUS, ANXIOUS, OR ON EDGE: SEVERAL DAYS
6. BECOMING EASILY ANNOYED OR IRRITABLE: SEVERAL DAYS
7. FEELING AFRAID AS IF SOMETHING AWFUL MIGHT HAPPEN: NOT AT ALL
GAD7 TOTAL SCORE: 6

## 2022-12-02 ASSESSMENT — PATIENT HEALTH QUESTIONNAIRE - PHQ9: SUM OF ALL RESPONSES TO PHQ QUESTIONS 1-9: 3

## 2022-12-02 NOTE — PROGRESS NOTES
[facility]                                     Progress Note    Patient Name: Vidhi Tovar  Date: 12/2/2022         Service Type: Individual      Session Start Time: 10:02 AM  Session End Time: 10:48 AM     Session Length: 38-52 min    Session #: 4    Attendees: Client attended alone    Service Modality:  Video Visit:      Provider verified identity through the following two step process.  Patient provided:  Patient is known previously to provider    Telemedicine Visit: The patient's condition can be safely assessed and treated via synchronous audio and visual telemedicine encounter.      Reason for Telemedicine Visit: Patient has requested telehealth visit    Originating Site (Patient Location): Patient's home    Distant Site (Provider Location): Provider Remote Setting- Home Office    Consent:  The patient/guardian has verbally consented to: the potential risks and benefits of telemedicine (video visit) versus in person care; bill my insurance or make self-payment for services provided; and responsibility for payment of non-covered services.     Patient would like the video invitation sent by:  My Chart    Mode of Communication:  Video Conference via [facility]    Distant Location (Provider):  Off-site    As the provider I attest to compliance with applicable laws and regulations related to telemedicine.    DATA  Interactive Complexity: No  Crisis: No        Progress Since Last Session (Related to Symptoms / Goals / Homework):   Symptoms: Improving based on PHQ-9 and DIONNE-7 scores and patient self-report. Patient endorses symptoms of  feeling hopeless, feeling tired, low self-worth, feeling anxious, difficulty controlling worry, worrying about different things, difficulty relaxing, and irritability.     Homework: Partially completed      Episode of Care Goals: Satisfactory progress - PREPARATION (Decided to change - considering how); Intervened by negotiating a change plan and determining  "options / strategies for behavior change, identifying triggers, exploring social supports, and working towards setting a date to begin behavior change     Current / Ongoing Stressors and Concerns:   Patient reports she is stressed about work. Patient reports she is concerned about her parents and them aging. Patient reports she is concerned about \"the world\". Patient reports she is concerned about her interpersonal relationships. Patient reports she is concerned about organization skills. Patient reports she is concerned about how her ADHD impacts social relationships. Patient reports she experienced an interpersonal conflict on her vacation. Patient reports it was nice to take a break from work on vacation.      Treatment Objective(s) Addressed in This Session:    Patient will learn about the diagnosis and symptoms of ADHD.     Patient will learn how ADHD can impact interpersonal relationships and social skills.    Client will learn about co-morbidities between ADHD and anxiety disorders.      Intervention:   Motivational Interviewing: supported patient in processing and exploring her experience on vacation. Validated patient's emotions. Supported patient in processing a conflict between her and her travel partner. Discussed rejection sensitivity and how ADHD can impact this. Supported patient in identifying traits of her healthy relationships. Discussed direct communication.     Assessments completed prior to visit:  The following assessments were completed by patient for this visit:  PHQ9:   PHQ-9 SCORE 9/28/2022 10/31/2022 12/2/2022   PHQ-9 Total Score 6 15 3     GAD7:   DIONNE-7 SCORE 6/22/2022 6/22/2022 9/28/2022 10/31/2022 12/2/2022   Total Score - 5 (mild anxiety) - - -   Total Score 5 5 4 13 6     PROMIS 10-Global Health (only subscores and total score):   PROMIS-10 Scores Only 6/28/2022 7/19/2022 10/31/2022   Global Mental Health Score 11 11 13   Global Physical Health Score 15 16 14   PROMIS TOTAL - " SUBSCORES 26 27 27         ASSESSMENT: Current Emotional / Mental Status (status of significant symptoms):   Risk status (Self / Other harm or suicidal ideation)   Patient denies current fears or concerns for personal safety.   Patient denies current or recent suicidal ideation or behaviors.   Patient denies current or recent homicidal ideation or behaviors.   Patient denies current or recent self injurious behavior or ideation.   Patient denies other safety concerns.   Patient reports there has been no change in risk factors since their last session.     Patient reports there has been no change in protective factors since their last session.     Recommended that patient call 911 or go to the local ED should there be a change in any of these risk factors.     Appearance:   Appropriate    Eye Contact:   Good    Psychomotor Behavior: Normal    Attitude:   Cooperative    Orientation:   All   Speech    Rate / Production: Hyperverbal     Volume:  Normal    Mood:    Anxious  Depressed    Affect:    Worrisome    Thought Content:  Rumination    Thought Form:  Coherent    Insight:    Good      Medication Review:   No changes to current psychiatric medication(s)     Medication Compliance:   Yes     Changes in Health Issues:   None reported     Chemical Use Review:   Substance Use: Problem use continues with no change since last session, Not addressed in session        Tobacco Use: No current tobacco use.      Diagnosis:  1. Attention deficit hyperactivity disorder (ADHD), combined type    2. Mild episode of recurrent major depressive disorder (H)    3. Other specified anxiety disorders        Collateral Reports Completed:   Not Applicable    PLAN: (Patient Tasks / Therapist Tasks / Other)  Patient will identify ways that her ADHD impacts her interpersonal relationships. Patient will journal about how empathy for herself and others is impacted by ADHD. We will discuss next session.         TAMY York  LICSW 12/2/2022    _____________________________________________________                                              Individual Treatment Plan    Patient's Name: Vidhi Tovar  YOB: 1980    Date of Creation: 11/9/2022  Date Treatment Plan Last Reviewed/Revised: 11/9/2022    DSM5 Diagnoses: Attention-Deficit/Hyperactivity Disorder  314.01 (F90.2) Combined presentation, 296.31 (F33.0) Major Depressive Disorder, Recurrent Episode, Mild _ or 300.09 (F41.8) Other Specified Anxiety Disorder   Psychosocial / Contextual Factors: Patient is self-employed. Patient is single. Patient is taking care of her elderly parents.   PROMIS (reviewed every 90 days):   The following assessments were completed by patient for this visit:  PROMIS 10-Global Health (only subscores and total score):   PROMIS-10 Scores Only 6/28/2022 7/19/2022 10/31/2022   Global Mental Health Score 11 11 13   Global Physical Health Score 15 16 14   PROMIS TOTAL - SUBSCORES 26 27 27       Referral / Collaboration:  Referral to another professional/service is not indicated at this time..    Anticipated number of session for this episode of care: 9-12 sessions  Anticipation frequency of session: Weekly  Anticipated Duration of each session: 38-52 minutes  Treatment plan will be reviewed in 90 days or when goals have been changed.       MeasurableTreatment Goal(s) related to diagnosis / functional impairment(s)  Goal 1:Patient will work on stabilizing symptoms of ADHD.      Objective #A  Patient will learn about the diagnosis and symptoms of ADHD.   Status: New - Date: 11/9/2022     Intervention(s)  Therapist will teach about the diagnosis of ADHD.     Objective #B  Patient will learn 3 skills to improve organization.                        Status: New - Date: 11/9/2022     Intervention(s)  Therapist will teach organizational skills.      Objective #C   Patient will learn how ADHD can impact interpersonal relationships and social skills.    Status: New - Date: 11/9/2022     Intervention(s)  Therapist will teach about the symptoms of ADHD and how a neuro-divergent brain tries to build social connections.        Goal 2: Client will work on stabilizing anxiety symptoms as evidenced by DIONNE-7 scores (current is 13) and Self report.  Goal is to reduce by five points.      Objective #A Client will identify triggers of anxiety and process them in session.    Status: New - Date: 11/9/2022    Intervention(s)  Therapist will teach emotional recognition/identification by assigning homework to journal with written exercises.    Objective #B  Client will identify initial signs or symptoms of anxiety.    Status: New - Date: 11/9/2022    Intervention(s)  Therapist will teach emotional regulation skills, such as deep breathing and mindfulness skills.    Objective #C  Client will learn about co-morbidities between ADHD and anxiety disorders.   Status: New - Date: 11/9/2022    Intervention(s)  Therapist will provide educational materials on anxiety disorders and ADHD.          Patient has reviewed and agreed to the above plan.      TAMY York LIC  November 9, 2022    Trent Ceballos  LIC  12/2/2022

## 2022-12-07 ENCOUNTER — OFFICE VISIT (OUTPATIENT)
Dept: FAMILY MEDICINE | Facility: CLINIC | Age: 42
End: 2022-12-07
Payer: COMMERCIAL

## 2022-12-07 ENCOUNTER — VIRTUAL VISIT (OUTPATIENT)
Dept: PSYCHOLOGY | Facility: CLINIC | Age: 42
End: 2022-12-07
Payer: COMMERCIAL

## 2022-12-07 VITALS
TEMPERATURE: 98.9 F | HEART RATE: 94 BPM | WEIGHT: 181.2 LBS | OXYGEN SATURATION: 96 % | DIASTOLIC BLOOD PRESSURE: 77 MMHG | SYSTOLIC BLOOD PRESSURE: 138 MMHG | BODY MASS INDEX: 28.44 KG/M2 | HEIGHT: 67 IN

## 2022-12-07 DIAGNOSIS — F33.0 MILD EPISODE OF RECURRENT MAJOR DEPRESSIVE DISORDER (H): ICD-10-CM

## 2022-12-07 DIAGNOSIS — F41.8 OTHER SPECIFIED ANXIETY DISORDERS: ICD-10-CM

## 2022-12-07 DIAGNOSIS — L98.9 NON-HEALING SKIN LESION OF NOSE: Primary | ICD-10-CM

## 2022-12-07 DIAGNOSIS — F90.2 ATTENTION DEFICIT HYPERACTIVITY DISORDER (ADHD), COMBINED TYPE: Primary | ICD-10-CM

## 2022-12-07 PROCEDURE — 90834 PSYTX W PT 45 MINUTES: CPT | Mod: 95

## 2022-12-07 RX ORDER — LEVONORGESTREL 19.5 MG/1
1 INTRAUTERINE DEVICE INTRAUTERINE ONCE
COMMUNITY
End: 2023-06-14

## 2022-12-07 NOTE — PROGRESS NOTES
"Assessment & Plan   Vidhi was seen today for derm problem.    Diagnoses and all orders for this visit:    Non-healing skin lesion of nose  -     Adult Dermatology Referral  Concern for possible SCC vs BCC. Referred to derm.    RTC CANDI Ryan, NP   ______________________________________    Subjective   Mindy is a 42 year old patient here today for Derm Problem (Spot at the end of the nose, )    Non-healing spot on the end of her nose  Present for the last year  Just looked light a pink bump, but recently scabbed and then the scab fell off  Had a small amount of pus come out, no bleeding  Worried about skin cancer    Do you need any refills on your Medications today? No      Medical, surgical, family and social histories reviewed and updated as indicated.   Medications and allergies reviewed and updated as indicated.       ROS  Review Of Systems  See HPI      General Physical Exam:  Vitals: /77   Pulse 94   Temp 98.9  F (37.2  C) (Oral)   Ht 1.699 m (5' 6.9\")   Wt 82.2 kg (181 lb 3.2 oz)   SpO2 96%   BMI 28.46 kg/m    Physical Exam  Vitals and nursing note reviewed.   Constitutional:       General: She is not in acute distress.     Appearance: Normal appearance. She is not ill-appearing.   HENT:      Head: Normocephalic and atraumatic.   Eyes:      General: Lids are normal.      Conjunctiva/sclera: Conjunctivae normal.   Pulmonary:      Effort: Pulmonary effort is normal.   Skin:     Comments: Pink papule on tip of nares, no scaling or bleeding. Telangiectasias present on the papule.    Neurological:      General: No focal deficit present.      Mental Status: She is alert and oriented to person, place, and time.      Gait: Gait is intact.   Psychiatric:         Mood and Affect: Mood normal.         Behavior: Behavior normal.         Thought Content: Thought content normal.         Judgment: Judgment normal.       "

## 2022-12-07 NOTE — NURSING NOTE
"ROOM:1    Preferred Name: Mindy     42 year old  Chief Complaint   Patient presents with     Derm Problem     Spot at the end of the nose,        Blood pressure 138/77, pulse 94, temperature 98.9  F (37.2  C), temperature source Oral, height 1.699 m (5' 6.9\"), weight 82.2 kg (181 lb 3.2 oz), SpO2 96 %. Body mass index is 28.46 kg/m .      There is no problem list on file for this patient.      Wt Readings from Last 2 Encounters:   12/07/22 82.2 kg (181 lb 3.2 oz)   10/05/22 81.5 kg (179 lb 11.2 oz)     BP Readings from Last 3 Encounters:   12/07/22 138/77   10/05/22 130/84   07/06/22 110/73       No Known Allergies    Current Outpatient Medications   Medication     ALPRAZolam (XANAX) 0.25 MG tablet     levonorgestrel (KYLEENA) 19.5 MG IUD     atovaquone-proguanil (MALARONE) 250-100 MG tablet     valACYclovir (VALTREX) 1000 mg tablet     valACYclovir HCl (VALTREX PO)     No current facility-administered medications for this visit.       Social History     Tobacco Use     Smoking status: Former     Smokeless tobacco: Never   Vaping Use     Vaping Use: Never used   Substance Use Topics     Alcohol use: Not Currently     Comment: former     Drug use: Yes     Types: Marijuana, Psilocybin     Comment: occasional use       Honoring Choices - Health Care Directive Guide offered to patient at time of visit.    Health Maintenance Due   Topic Date Due     ADVANCE CARE PLANNING  Never done     DEPRESSION ACTION PLAN  Never done     HEPATITIS B IMMUNIZATION (2 of 3 - Hep B Twinrix 3-dose series) 11/15/2014       Immunization History   Administered Date(s) Administered     COVID-19 Vaccine 12+ (Pfizer 2022) 04/08/2022     Influenza Vaccine >6 months (Alfuria,Fluzone) 10/05/2022     Influenza Vaccine, 6+MO IM (QUADRIVALENT W/PRESERVATIVES) 11/01/2019, 10/13/2020     TDAP Vaccine (Boostrix) 06/17/2019       No results found for: PAP      Recent Labs   Lab Test 06/21/22  0807 05/19/21  0000   A1C 5.6  --    *  --    HDL 41*  " --    TRIG 268* 196*   CR 0.81  --    GFRESTIMATED >90  --    POTASSIUM 4.1  --    TSH 3.23  --        PHQ-2 ( 1999 Pfizer) 12/7/2022 10/31/2022   Q1: Little interest or pleasure in doing things 1 1   Q2: Feeling down, depressed or hopeless 1 1   PHQ-2 Score 2 2   Q1: Little interest or pleasure in doing things - Several days   Q2: Feeling down, depressed or hopeless - Several days   PHQ-2 Score - 2       PHQ-9 SCORE 9/28/2022 10/31/2022 12/2/2022   PHQ-9 Total Score 6 15 3       DIONNE-7 SCORE 9/28/2022 10/31/2022 12/2/2022   Total Score - - -   Total Score 4 13 6       No flowsheet data found.      Abigail Edmondson, [facility]  December 7, 2022 7:59 AM

## 2022-12-07 NOTE — PROGRESS NOTES
[facility]                                     Progress Note    Patient Name: Vidhi Tovar  Date: 12/7/2022         Service Type: Individual      Session Start Time: 10:31 AM  Session End Time: 11:20 AM     Session Length: 38-52 min    Session #: 5    Attendees: Client attended alone    Service Modality:  Video Visit:      Provider verified identity through the following two step process.  Patient provided:  Patient is known previously to provider    Telemedicine Visit: The patient's condition can be safely assessed and treated via synchronous audio and visual telemedicine encounter.      Reason for Telemedicine Visit: Patient has requested telehealth visit    Originating Site (Patient Location): Patient's home    Distant Site (Provider Location): Provider Remote Setting- Home Office    Consent:  The patient/guardian has verbally consented to: the potential risks and benefits of telemedicine (video visit) versus in person care; bill my insurance or make self-payment for services provided; and responsibility for payment of non-covered services.     Patient would like the video invitation sent by:  My Chart    Mode of Communication:  [facility] Conference [facility]    Distant Location (Provider):  Off-site    As the provider I attest to compliance with applicable laws and regulations related to telemedicine.    DATA  Interactive Complexity: No  Crisis: No        Progress Since Last Session (Related to Symptoms / Goals / Homework):   Symptoms: No change based on patient self-report. Patient endorses symptoms of  feeling hopeless, feeling tired, low self-worth, feeling anxious, difficulty controlling worry, worrying about different things, difficulty relaxing, and irritability.     Homework: Partially completed      Episode of Care Goals: Satisfactory progress - ACTION (Actively working towards change); Intervened by reinforcing change plan / affirming steps taken     Current / Ongoing Stressors  "and Concerns:   Patient reports she is stressed about work. Patient reports she is concerned about her parents and them aging. Patient reports she is concerned about \"the world\". Patient reports she is concerned about her interpersonal relationships. Patient reports she is concerned about organization skills. Patient reports she is concerned about how her ADHD impacts social relationships. Patient reports she experienced an interpersonal conflict on her vacation. Patient reports feeling concerned about taking a trip with her family. Patient reports concerns about rejection sensitivity.      Treatment Objective(s) Addressed in This Session:    Patient will learn about the diagnosis and symptoms of ADHD.     Patient will learn how ADHD can impact interpersonal relationships and social skills.    Client will learn about co-morbidities between ADHD and anxiety disorders.      Intervention:   Motivational Interviewing: supported patient in processing and exploring an experience of rejection sensitivity this week. Validated patient's emotions. Supported patient in exploring how she can communicate her feelings and needs to others. Discussed clear and direct communication skills. Supported patient in identifying her social needs.    Supported patient in processing and exploring concerns related to traveling with her family. Validated patient's emotions. Discussed taking breaks as needed to help herself regulate. Supported patient in identifying ways that she can take a break and check in with herself to know when to take it. Discussed how the body hold stress and emotions. Taught body scan skill.     Assessments completed prior to visit:  The following assessments were completed by patient for this visit:  PHQ9:   PHQ-9 SCORE 9/28/2022 10/31/2022 12/2/2022   PHQ-9 Total Score 6 15 3     GAD7:   DIONNE-7 SCORE 6/22/2022 6/22/2022 9/28/2022 10/31/2022 12/2/2022   Total Score - 5 (mild anxiety) - - -   Total Score 5 5 4 13 6 "     PROMIS 10-Global Health (only subscores and total score):   PROMIS-10 Scores Only 6/28/2022 7/19/2022 10/31/2022   Global Mental Health Score 11 11 13   Global Physical Health Score 15 16 14   PROMIS TOTAL - SUBSCORES 26 27 27         ASSESSMENT: Current Emotional / Mental Status (status of significant symptoms):   Risk status (Self / Other harm or suicidal ideation)   Patient denies current fears or concerns for personal safety.   Patient denies current or recent suicidal ideation or behaviors.   Patient denies current or recent homicidal ideation or behaviors.   Patient denies current or recent self injurious behavior or ideation.   Patient denies other safety concerns.   Patient reports there has been no change in risk factors since their last session.     Patient reports there has been no change in protective factors since their last session.     Recommended that patient call 911 or go to the local ED should there be a change in any of these risk factors.     Appearance:   Appropriate    Eye Contact:   Good    Psychomotor Behavior: Normal    Attitude:   Cooperative    Orientation:   All   Speech    Rate / Production: Hyperverbal     Volume:  Normal    Mood:    Anxious  Depressed    Affect:    Worrisome    Thought Content:  Rumination    Thought Form:  Coherent    Insight:    Good      Medication Review:   No changes to current psychiatric medication(s)     Medication Compliance:   Yes     Changes in Health Issues:   None reported     Chemical Use Review:   Substance Use: Problem use continues with no change since last session, Not addressed in session        Tobacco Use: No current tobacco use.      Diagnosis:  1. Attention deficit hyperactivity disorder (ADHD), combined type    2. Mild episode of recurrent major depressive disorder (H)    3. Other specified anxiety disorders        Collateral Reports Completed:   Not Applicable    PLAN: (Patient Tasks / Therapist Tasks / Other)  Patient will practice a body  scan daily. Patient will be mindful about whether that reduces the amount of stress she is holding in her body. Patient will practice taking breaks as needed while traveling with her family. We will discuss next session.         TAMY York [facility] 12/7/2022    _____________________________________________________                                              Individual Treatment Plan    Patient's Name: Vidhi Tovar  YOB: 1980    Date of Creation: 11/9/2022  Date Treatment Plan Last Reviewed/Revised: 11/9/2022    DSM5 Diagnoses: Attention-Deficit/Hyperactivity Disorder  314.01 (F90.2) Combined presentation, 296.31 (F33.0) Major Depressive Disorder, Recurrent Episode, Mild _ or 300.09 (F41.8) Other Specified Anxiety Disorder   Psychosocial / Contextual Factors: Patient is self-employed. Patient is single. Patient is taking care of her elderly parents.   PROMIS (reviewed every 90 days):   The following assessments were completed by patient for this visit:  PROMIS 10-Global Health (only subscores and total score):   PROMIS-10 Scores Only 6/28/2022 7/19/2022 10/31/2022   Global Mental Health Score 11 11 13   Global Physical Health Score 15 16 14   PROMIS TOTAL - SUBSCORES 26 27 27       Referral / Collaboration:  Referral to another professional/service is not indicated at this time..    Anticipated number of session for this episode of care: 9-12 sessions  Anticipation frequency of session: Weekly  Anticipated Duration of each session: 38-52 minutes  Treatment plan will be reviewed in 90 days or when goals have been changed.       MeasurableTreatment Goal(s) related to diagnosis / functional impairment(s)  Goal 1:Patient will work on stabilizing symptoms of ADHD.      Objective #A  Patient will learn about the diagnosis and symptoms of ADHD.   Status: New - Date: 11/9/2022     Intervention(s)  Therapist will teach about the diagnosis of ADHD.     Objective #B  Patient will learn 3 skills to  improve organization.                        Status: New - Date: 11/9/2022     Intervention(s)  Therapist will teach organizational skills.      Objective #C   Patient will learn how ADHD can impact interpersonal relationships and social skills.   Status: New - Date: 11/9/2022     Intervention(s)  Therapist will teach about the symptoms of ADHD and how a neuro-divergent brain tries to build social connections.        Goal 2: Client will work on stabilizing anxiety symptoms as evidenced by DIONNE-7 scores (current is 13) and Self report.  Goal is to reduce by five points.      Objective #A Client will identify triggers of anxiety and process them in session.    Status: New - Date: 11/9/2022    Intervention(s)  Therapist will teach emotional recognition/identification by assigning homework to journal with written exercises.    Objective #B  Client will identify initial signs or symptoms of anxiety.    Status: New - Date: 11/9/2022    Intervention(s)  Therapist will teach emotional regulation skills, such as deep breathing and mindfulness skills.    Objective #C  Client will learn about co-morbidities between ADHD and anxiety disorders.   Status: New - Date: 11/9/2022    Intervention(s)  Therapist will provide educational materials on anxiety disorders and ADHD.          Patient has reviewed and agreed to the above plan.      TAMY York LICSW  November 9, 2022    Trent Ceballos  LICSW  12/15/2022

## 2022-12-07 NOTE — PATIENT INSTRUCTIONS
[facility]  Address: [address], [place] [id number]  Phone: [phone]

## 2022-12-13 NOTE — PROGRESS NOTES
"[facility]   [facility]      Behavioral Health Clinician Progress Note    Patient Name: Vidhi Tovar  \"Mindy\"          Service Type:  Individual      Service Location:   MyChart / Email (patient reached)     Session Start Time: 830am  Session End Time: 852am      Session Length: 16 - 37      Attendees: Patient     Service Modality:  Video Visit:      Provider verified identity through the following two step process.  Patient provided:  Patient  and Patient address    Telemedicine Visit: The patient's condition can be safely assessed and treated via synchronous audio and visual telemedicine encounter.      Reason for Telemedicine Visit: Services only offered telehealth    Originating Site (Patient Location): Patient's home    Distant Site (Provider Location): Provider Remote Setting- Home Office    Consent:  The patient/guardian has verbally consented to: the potential risks and benefits of telemedicine (video visit) versus in person care; bill my insurance or make self-payment for services provided; and responsibility for payment of non-covered services.     Patient would like the video invitation sent by:  My Chart    Mode of Communication:  Video Conference via [employer]well    Distant Location (Provider):  Off-site    As the provider I attest to compliance with applicable laws and regulations related to telemedicine.    Visit Activities (Refresh list every visit): [facility] Only    Diagnostic Assessment Date: 2022 by Bibiana Cochran PsyD, LP at [facility]  Treatment Plan Review Date: 3/14/2023  See Flowsheets for today's PHQ-9 and DIONNE-7 results  Previous PHQ-9:   PHQ-9 SCORE 2022 10/31/2022 2022   PHQ-9 Total Score 6 15 3     Previous DIONEN-7:   DIONNE-7 SCORE 2022 10/31/2022 2022   Total Score - - -   Total Score 4 13 6       CHIN LEVEL:  No flowsheet data found.    DATA  Extended Session (60+ minutes): " "No  Interactive Complexity: No  Crisis: No  [facility] Patient: No    Treatment Objective(s) Addressed in This Session:  Target Behavior(s): improve ability to function in relationships    Anxiety: will experience a reduction in anxiety    Current Stressors / Issues:   update: Pt was dx with ADHD, depression and anxiety by Bibiana Cochran PsyD. Pt reports that she has had depression and anxiety for a long time and was only recently dx with ADHD. She's been noticing that she experiences, time blindness, rejection dysphoria, masking, jaw clenching, challenges in her relationships, impulse control around shopping, self-medicating with alcohol (manage anxiety in social situations, reduced energy the following day which allows her to function better). [facility] explored with pt how she's been over compensating throughout the years. She's noticed as she's become overwhelmed and exhausted in trying to function day to day.  She's had to \"recaliberate\" her relationships due to energy loss which has resulted in changes in those relationships. Grief.    Stresses: adjusting to changes in her relationships  Appetite: unremarkable  Sleep: interrupted  Therapy: Chaya CARRILLO at [facility], Mikaela Cochran   ROBERT: hx of etoh abuse, used to self medicate. Current use 4 days per week, 5 drinks at a sitting (Adrian and joselyn or red wine). Pt reports that her mother is concerned but doesn't say anything. Brother has been in ROBERT treatment  Preg: No  Goals: \"regular sleep, desire to do things, ability to work out, more mental energy\"  Most important: Pt reports that she takes xanax for sleep or when she's will be engaging in anxiety related events like flying or meetings. Consult about psychiatric medication, doesn't want to do anything unnecessary but doesn't want to not do something that might be helpful. Can she take ADHD med PRN?       Progress on Treatment Objective(s) / Homework:  No improvement - ACTION (Actively working towards " change); Intervened by reinforcing change plan / affirming steps taken    Motivational Interviewing    MI Intervention: Co-Developed Goal: improve relationships, Expressed Empathy/Understanding, Open-ended questions, Reflections: simple and complex, Change talk (evoked) and Reframe     Change Talk Expressed by the Patient: Desire to change Ability to change Reasons to change Need to change Committment to change Activation Taking steps    Provider Response to Change Talk: E - Evoked more info from patient about behavior change, A - Affirmed patient's thoughts, decisions, or attempts at behavior change, R - Reflected patient's change talk and S - Summarized patient's change talk statements      Care Plan review completed: Yes    Medication Review:  No changes to current psychiatric medication(s)    Medication Compliance:  Yes    Changes in Health Issues:   None reported    Chemical Use Review:   Substance Use: Problem use continues with no change since last session, Stage of Change: Contemplation        Tobacco Use: No current tobacco use.      Assessment: Current Emotional / Mental Status (status of significant symptoms):  Risk status (Self / Other harm or suicidal ideation)  Patient denies a history of suicidal ideation, suicide attempts, self-injurious behavior, homicidal ideation, homicidal behavior and and other safety concerns  Patient denies current fears or concerns for personal safety.  Patient denies current or recent suicidal ideation or behaviors.  Patient denies current or recent homicidal ideation or behaviors.  Patient denies current or recent self injurious behavior or ideation.  Patient denies other safety concerns.  A safety and risk management plan has not been developed at this time, however patient was encouraged to call [facility] should there be a change in any of these risk factors.    Appearance:   Appropriate   Eye Contact:   Good   Psychomotor Behavior: Normal    Attitude:   Cooperative   Orientation:   All  Speech   Rate / Production: Normal    Volume:  Normal   Mood:    Anxious  Depressed   Affect:    Appropriate  Tearful  Thought Content:  Clear   Thought Form:  Coherent  Logical   Insight:    Good     Diagnoses:  1. Generalized anxiety disorder    2. Depression, major, recurrent, moderate (H)    3. Attention deficit hyperactivity disorder (ADHD), combined type        Collateral Reports Completed:  Collaborated with [facility] team    Plan: (Homework, other):  Patient was given information about behavioral services and encouraged to schedule a follow up appointment with the clinic [facility] in 1 month.  She was also given information about mental health symptoms and treatment options .  CD Recommendations: [facility] will continue to monitor use. Marisabel Chowdary [facility]         ______________________________________________________________________    Integrated Primary Care Behavioral Health Treatment Plan    Patient's Name: Vidhi Tovar  YOB: 1980    Date of Creation: 12/14/2022  Date Treatment Plan Last Reviewed/Revised: pending    DSM5 Diagnoses: 300.02 (F41.1) Generalized Anxiety Disorder  Psychosocial / Contextual Factors: relationship changes  PROMIS (reviewed every 90 days):     Referral / Collaboration:  Referral to another professional/service is not indicated at this time..    Anticipated number of session for this episode of care: 10-12  Anticipation frequency of session: Monthly  Anticipated Duration of each session: 16-37 minutes  Treatment plan will be reviewed in 90 days or when goals have been changed.       MeasurableTreatment Goal(s) related to diagnosis / functional impairment(s)  Goal 1: Patient will experience improved anxiety    I will know I've met my goal when regular sleep, desire to do things, ability to work out, more mental energy.      Objective #A (Patient Action)    Patient will use at least 4 coping skills for anxiety management in the  next 4 weeks.  Status: New - Date: 12/14/2022     Intervention(s)  Therapist will teach emotional regulation skills. to manage anxiety.        Patient has reviewed and agreed to the above plan.      LORENA Peoples  December 14, 2022

## 2022-12-14 ENCOUNTER — VIRTUAL VISIT (OUTPATIENT)
Dept: PSYCHIATRY | Facility: CLINIC | Age: 42
End: 2022-12-14
Payer: COMMERCIAL

## 2022-12-14 ENCOUNTER — VIRTUAL VISIT (OUTPATIENT)
Dept: BEHAVIORAL HEALTH | Facility: CLINIC | Age: 42
End: 2022-12-14
Payer: COMMERCIAL

## 2022-12-14 DIAGNOSIS — F90.2 ATTENTION DEFICIT HYPERACTIVITY DISORDER (ADHD), COMBINED TYPE: ICD-10-CM

## 2022-12-14 DIAGNOSIS — F33.1 DEPRESSION, MAJOR, RECURRENT, MODERATE (H): ICD-10-CM

## 2022-12-14 DIAGNOSIS — F41.9 ANXIETY: ICD-10-CM

## 2022-12-14 DIAGNOSIS — F90.0 ADHD (ATTENTION DEFICIT HYPERACTIVITY DISORDER), INATTENTIVE TYPE: Primary | ICD-10-CM

## 2022-12-14 DIAGNOSIS — F41.1 GENERALIZED ANXIETY DISORDER: Primary | ICD-10-CM

## 2022-12-14 DIAGNOSIS — F32.A DEPRESSION, UNSPECIFIED DEPRESSION TYPE: ICD-10-CM

## 2022-12-14 PROCEDURE — 99205 OFFICE O/P NEW HI 60 MIN: CPT | Mod: 95 | Performed by: NURSE PRACTITIONER

## 2022-12-14 PROCEDURE — 90832 PSYTX W PT 30 MINUTES: CPT | Mod: 95 | Performed by: SOCIAL WORKER

## 2022-12-14 RX ORDER — BUPROPION HYDROCHLORIDE 150 MG/1
150 TABLET ORAL EVERY MORNING
Qty: 30 TABLET | Refills: 1 | Status: SHIPPED | OUTPATIENT
Start: 2022-12-14 | End: 2023-01-10

## 2022-12-14 RX ORDER — ALPRAZOLAM 0.25 MG
0.25 TABLET ORAL 3 TIMES DAILY PRN
Qty: 10 TABLET | Refills: 0 | Status: SHIPPED | OUTPATIENT
Start: 2022-12-14 | End: 2023-02-14

## 2022-12-14 NOTE — PROGRESS NOTES
PSYCHIATRIC DIAGNOSTIC ASSESSMENT      Name:  Vidhi Tovar  : 1980    Mindy is a 42 year old who is being evaluated via a billable video visit.      How would you like to obtain your AVS? MyChart  If the video visit is dropped, the invitation should be resent by: Text to cell phone: [phone]  Will anyone else be joining your video visit? No      Telemedicine Visit: The patient's condition can be safely assessed and treated via synchronous audio and visual telemedicine encounter.      Reason for Telemedicine Visit: COVID 19 pandemic and the social and physical recommendations by the CDC and MD.      Originating Site (Patient Location): Patient's home    Distant Site (Provider Location): Provider Remote Setting    Consent:  The patient/guardian has verbally consented to: the potential risks and benefits of telemedicine (video visit or phone) versus in person care; bill my insurance or make self-payment for services provided; and responsibility for payment of non-covered services.     Mode of Communication:  [employer] video platform     As the provider I attest to compliance with applicable laws and regulations related to telemedicine.    IDENTIFICATION   Referred by: Tracy Ryan NP [facility]     Patient Care Team:  Tracy Ryan NP as PCP - General (Primary Care - [facility])  Bibiana Cochran, PhD LP as Assigned Behavioral Health Provider  Chaya Harmon MSW as Therapist  Tracy Ryan NP as Nurse Practitioner (Primary Care - [facility])  Therapist: ngaged in individual therapy for the past 2 years    History was provided by patient  who were  good historian(s).    Patient attended the session alone    RECORDS AVAILABLE FOR REVIEW: EHR records through [employer] .  In addition, reviewed the assessment today completed by [facility], [facility]al [facility] Consultant     Psychiatric testing completed within [facility] Diane Cochran  Diagnosis include:  Major Depressive Disorder,  Recurrent, Mild   Other Anxiety Disorder  Ms. Tovar presented with symptoms of depression and anxiety. She outlined adverse childhood events. She described her relationship with her father as emotionally and physically distant. She stated that her relationship with her mother was good yet there were no boundaries between them which was inappropriate, and she had to provide her mother with social-emotional support. She asserted that mental health symptoms emerged during her youth. She recalled crying spells and feeling like no one liked her. She acknowledged symptoms of social anxiety yet denied excessive generalized worries. She stated that her mother and partner tried to support her, but she could not be consoled. She reported that mood symptoms ebbed and flowed throughout the years. She highlighted she can perform and get through despite symptoms being present. She indicated that mood symptoms worsened after megan COVID-19 with increased fatigue and decreased motivation. When administered the MMPI-II and symptoms screeners, scores were clinically significant.      Attention Deficit Hyperactivity Disorder  Ms. Tovar met criterion for ADHD. She recalled symptoms emerging at the age of 8 years old. She indicated she had difficulty starting and completing homework and managing their emotions. Ms. Tovar s Childhood Symptoms forms from the [employer] Reports, as completed by her and her parents were mixed. She outlined clinically significant impairment in childhood from ADHD symptoms, but her parents did not. In adulthood, Ms. Tovar indicated she has been struggling to focus and complete tasks at home and work. She acknowledged that at work she is easily bored and distracted, had difficulty getting along with others, has trouble organizing and problems learning new tasks/information. The results of the remaining [employer] Reports as completed by only her also were mixed. She outlined clinically significant  "impairment from ADHD symptoms in the areas of current and executive functioning. When administered cognitive measures, the five items that are sensitive to ADHD symptoms fell in the average to the high average range.      Alcohol Use Disorder  History of Stimulant Use                                                 CHIEF COMPLAINT   Patient is a 42 year old,  White Not  or  female  who presents for initial psychiatric evaluation. Referred by   their Primary Care Provider: Tracy Ryan NP to the [facility] ([facility]) for evaluation of depression, anxiety and attentional problems.  Our psychiatry providers act as a specialty service for Primary Care Providers in the [facility] who seek to optimize medications for unstable patients.  Once medications have been optimized, our providers discharge the patient back to the referring Primary Care Provider for ongoing medication management.  This type of system allows our providers to serve a high volume of patients.      Note by PCP day of referral:  \"Recent diagnosis of ADHD, depression and anxiety. Not of any current medications     HISTORY OF PRESENT ILLNESS   Per [facility], Marisabel Chowdary, during today's team-based visit:  \"MH update: Pt was dx with ADHD, depression and anxiety by Bibiana Cochran PsyD. Pt reports that she has had depression and anxiety for a long time and only recently with ADHD. She's been noticing that she experiences, time blindness, rejection dysphoria, masking, jaw clenching, challenges in her relationships, impulse control around shopping, self-medicating with alcohol (manage anxiety in social situations, reduced energy the following day which allows her to function better). [facility] explored with pt how she's been over compensating throughout the years. She's noticed as she's become overwhelmed and exhausted in trying to function day to day she's had to \"recaliberate\" her relationships which has " "resulted in changes in those relationships. Grief.    Stresses: adjusting to changes in her relationships  Appetite: unremarkable  Sleep: interrupted  [facility]  ROBERT: hx of etoh abuse, used to self medicate. Current use 4 days per week, 5 drinks at a sitting (Adrian and joselyn or red wine). Pt reports that her mother is concerned but doesn't say anything. Brother has been in ROBERT treatment  Preg: No  Goals: \"regular sleep, desire to do things, ability to work out, more mental energy\"  Most important: Pt reports that she takes xanax for sleep or when she's will be engaging in anxiety related events like flying or meetings. Consult about psychiatric medication, doesn't want to do anything unnecessary but doesn't want to not do something that might be helpful. Can she take ADHD med PRN?\"    Reports past diagnosis of depression, anxiety and ADHD with recent psych testing within [facility].  Reports she has typically stayed away from medications.  She was referred for psychiatric testing due to poor concentration and inattention.    Ms. Tovar indicated she has been e. She stated she has been prescribed Xanax to manage anxiety symptoms; diagnostic assessment has not been completed. She stated that her therapist recommended an evaluation to assess  baseline  functioning and she expressed concern regarding  impulse control.  She questioned if ADHD is present because she  zones out  and has difficulty completing tasks. She highlighted she was given academic accommodations as a youth without a formal diagnosis. She acknowledged that symptoms impair her functioning at home and work as well as in social relationships.    Elementary school:  At age 8 teachers reached out to mother to have tested for ADHD but mother did not follow through.    [employer]: report card stated chatting a lot, academics was adequate but doesn't recall doing much homework  [employer] High: essentially the " same as [employer]    Endorses often or very often experiencing the following:   -having trouble wrapping up the final details for a project, once the challenging parts have been done?  NO, because she doesn't get paid for work.  For other things in life, she was a procrastinator.  -Having difficulty getting things in order when they have to do a task that requires organization YES, SPECIFICALLY WHEN KEEPING HOUSE, NEEDS REMINDERS  -Having problems remembering appointments or obligations NO, USING THE CALENDAR  -Fidgets or squirms with hands or feet when you has to sit down for a long time PLAYING WITH HAIR, LOOKING OFF IN DIFFERENT DIRECTIONS  -When a task that requires a lot of thought, often avoid or delay getting started ALWAYS  -Feeling overly active and compelled to do things, as if driven by a motor IN SOME SITUATIONS   -Making careless mistakes when has to work on a boring or difficult project ALWAYS  -Having difficulty keeping attention when doing boring or repetitive work ALWAYS  -Having difficulty concentrating on what people say to them, even when they are speaking directly at them ALWAYS  -misplacing or have difficulty finding things at home or at work NO  -Distracted by activity or noise around you ALWAYS  -Leaving their seat in meetings or other situations in which there is an expection to remain seated NO  -Feeling restless or fidgety SOMETIMES  -Having difficulty unwinding and relaxing with time to themselves HISTORICALLY, HAS USED ALCOHOL TO UNWIND  -Talking too much in social situations ALWAYS  -Finding themselves finishing the sentences of the people they are talking to, before  they can finish them themselves ALWAYS, TRYING TO NOT DO THIS  -Having difficulty waiting your turn in situations when turn taking is required NO  - Interrupting others when they are busy NO        PSYCHIATRIC HISTORY:   Previous psychiatry: denies   Previous therapist: currently  History of Psychiatric Hospitalizations:    - Inpatient: denies    - IOP/PHP/Day treatment: denies    History of Suicidal Ideation: denies   History of Suicide Attempts:  Denies     History of Self-injurious Behavior:  Denies   History of Violence/Aggression: denies    History of Commitment?  Denies    Electroconvulsive Therapy (ECT) or Transcranial Magnetic Stimulation (TMS): denies    PharmacogenomicTesting (such as GeneSight):      PSYCHIATRIC REVIEW OF SYSTEMS:   Sleep: when going to sleep at times cannot stop her mind from racing         Depression:     Lack of interest, Change in energy level, Change in appetite, Psychomotor slowing or agitation, Feelings of hopelessness, Feelings of helplessness, Irritability, Feeling sad, down, or depressed and Withdrawn  Mera:             No Symptoms  Psychosis:       No Symptoms  Anxiety:           Social anxiety, Sleep disturbance, Poor concentration, Irritability and Anger outbursts. Anxiety with flying.  Anxiety with performance.    Panic:              Palpitations, Sense of impending doom and Vertigo (2x/wk)  Post Traumatic Stress Disorder:  No Symptoms  Eating Disorder:          No Symptoms  ADD / ADHD:              Inattentive, Difficulties listening, Poor task completion, Poor organizational skills, Distractibility, Forgetful, Interrupts, Intrudes, Impulsive and Restlessness/fidgety  Conduct Disorder:       No symptoms  Autism Spectrum Disorder:     No symptoms  Obsessive Compulsive Disorder:       No Symptoms     ASSESSMENT SCALES:  PHQ-9 SCORE 9/28/2022 10/31/2022 12/2/2022   PHQ-9 Total Score 6 15 3       Last PHQ-9 12/2/2022   1.  Little interest or pleasure in doing things 0   2.  Feeling down, depressed, or hopeless 1   3.  Trouble falling or staying asleep, or sleeping too much 0   4.  Feeling tired or having little energy 1   5.  Poor appetite or overeating 0   6.  Feeling bad about yourself 1   7.  Trouble concentrating 0   8.  Moving slowly or restless 0   Q9: Thoughts of better off  dead/self-harm past 2 weeks 0   PHQ-9 Total Score 3   Difficulty at work, home, or with people Somewhat difficult     PHQ9 score is 3 indicating minimal depression.  Suicidal ideation:  Denies    DIONNE-7 SCORE 9/28/2022 10/31/2022 12/2/2022   Total Score - - -   Total Score 4 13 6     DIONNE-7   [employer], 2002; Used with Permission) 6/22/2022 6/22/2022 9/28/2022 10/31/2022 12/2/2022   1. Feeling nervous, anxious, or on edge 1 1 1 2 1   2. Not being able to stop or control worrying 1 1 0 1 1   3. Worrying too much about different things 0 0 0 1 1   4. Trouble relaxing 0 0 1 2 2   5. Being so restless that it is hard to sit still 1 1 1 1 0   6. Becoming easily annoyed or irritable 2 2 1 3 1   7. Feeling afraid, as if something awful might happen 0 0 0 3 0   DIONNE-7 Total Score 5 5 4 13 6   If you checked any problems, how difficult have they made it for you to do your work, take care of things at home, or get along with other people? - - - Very difficult Somewhat difficult     GAD7 score is is 6 indicating mild anxiety.     A 12-item WHODAS 2.0 assessment was completed by the patient today and recorded in EPIC.    WHODAS 2.0 Total Score 6/22/2022 12/14/2022   Total Score 26 22   Total Score MyChart 26 22       All other ROS negative.     FAMILY, MEDICAL, SURGICAL HISTORY REVIEWED.  MEDICATION HAVE BEEN REVIEWED AND ARE CURRENT TO THE BEST OF MY KNOWLEDGE AND ABILITY.  Produce political ads, which varies in intensity    MEDICATIONS                                                                                                Current Outpatient Medications   Medication Sig     ALPRAZolam (XANAX) 0.25 MG tablet Take 1 tablet (0.25 mg) by mouth 3 times daily as needed for anxiety     levonorgestrel (KYLEENA) 19.5 MG IUD 1 each by Intrauterine route once     valACYclovir (VALTREX) 1000 mg tablet Take 2 tablets (2,000 mg) by mouth 2 times daily (Patient taking differently: Take 2,000 mg by mouth as needed)     No current  "facility-administered medications for this visit.       DRUG MONITORING:  [place] Prescription Monitoring Program [place] controlled substances in the last [place] in [place]:  [place] review was not needed today..    CURRENT MEDICATION SIDE EFFECTS REPORTED:  none    NOTES ABOUT CURRENT PSYCHOTROPIC MEDICATIONS:   Alprazolam 0.25 mg, getting small quantity and using appropriately     PAST PSYCHOTROPIC MEDICATIONS:  none    VITALS   There were no vitals taken for this visit.     BP Readings from Last 1 Encounters:   12/07/22 138/77     Pulse Readings from Last 1 Encounters:   12/07/22 94     Wt Readings from Last 1 Encounters:   12/07/22 82.2 kg (181 lb 3.2 oz)     Ht Readings from Last 1 Encounters:   12/07/22 1.699 m (5' 6.9\")     Estimated body mass index is 28.46 kg/m  as calculated from the following:    Height as of 12/7/22: 1.699 m (5' 6.9\").    Weight as of 12/7/22: 82.2 kg (181 lb 3.2 oz).      PERTINENT HISTORY     There is no problem list on file for this patient.     Denial of significant medical history. No familial history of cardiac deaths     Seizures or Head Injury: Denies history of head injury. Denies history of seizures.       No past surgical history on file.     SOCIAL HISTORY  younger of two children born to her parents. She was raised in [place], [place] along with her older brother. She acknowledged that her parents  marriage was unhealthy, and her mother told her she was  dissatisfied  with the relationship; she denied witnessing incidents of domestic violence. She disclosed that her father was emotionally and physically distant.       Relationship status: single  Children: denies   Highest education level was college graduate.    Service: denies   Employment status: full time     Trauma history: Denies    LEGAL:  Denies      SUBSTANCE USE HISTORY  Social History     Tobacco Use     Smoking status: Former     Smokeless tobacco: Never   Substance " Use Topics     Alcohol use: Not Currently     Comment: former       Caffeine:  Unremarkable   Ms. Tovar outlined a problematic pattern of alcohol and cocaine use. She reported she consumed alcohol and smoked cannabis for the first time at the age of 13 years old. She indicated that at the age of 16 years old, she was drinking alcohol regularly with peers. 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 She highlighted that her mother expressed the belief that the illness kept her from becoming an alcoholic. She acknowledged she returned to drinking after treatment. She asserted that while residing in [place], her alcohol use increased. She reported that when she transferred to [place] her alcohol use decreased because she did not have as many friends. 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 She stated that throughout the years she has discontinued use for times. She highlighted that on 05/08/2022, she stopped drinking alcohol but two weeks ago, she drank 5 days in a row. She recognized she has been medicating something with alcohol. She disclosed she abused Adderall in college and from the ages of 26 to 34 years old, she abused cocaine. She reported that her use of cocaine in her mid-twenties occurred every workday.     Chemical dependency history: Patient has not received chemical dependency treatment in the past       Family History   Problem Relation Age of Onset     Hypertension Father      Substance Abuse Maternal Grandmother      Diabetes Maternal Grandmother      Cancer Maternal Grandfather      Cancer Paternal Grandmother      Substance Abuse Paternal Grandfather      Depression Brother             PERTINENT FAMILY PSYCHIATRIC HISTORY NOTES     Maternal: negative family history of diagnosed psychiatric illness.   Paternal: negative family history  of diagnosed psychiatric illness.   Siblings: Depression in her brother     Substance use history in family: Substance Abuse in her maternal grandmother and paternal grandfather     Family suicide history: not discussed   Medications family responded to: Unknown    Based on the clinical interview, there  are not indications of current substance use. Continue to monitor.  Motivational interviewing utilized. Currently in the stage of Maintenance (Maintaining the behavior change) , Discussed morbidity and mortality of continued substance abuse.  and Encouraged sobriety supports in the community. .     LABS & IMAGING                                                                                                                   Recent Labs   Lab Test 06/21/22  0807   HGB 13.0     Recent Labs   Lab Test 06/21/22  0807   *   POTASSIUM 4.1   CHLORIDE 103   CO2 19*   GLC 81   DOC 8.9   BUN 7.3   CR 0.81   GFRESTIMATED >90     Recent Labs   Lab Test 06/21/22  0807   CHOL 199   *   HDL 41*   TRIG 268*   A1C 5.6     Recent Labs   Lab Test 06/21/22  0807   TSH 3.23        ALLERGY & IMMUNIZATIONS     No Known Allergies        MEDICAL REVIEW OF SYSTEMS:   Ten system review was completed with pertinent positives noted above    MENTAL STATUS EXAM:   General/Constitutional:  Appearance:   awake, alert, adequately groomed, appeared stated age and no apparent distress  Attitude:    cooperative   Eye Contact:  good  Musculoskeletal:  Psychomotor Behavior:  no evidence of tardive dyskinesia, dystonia, or tics from the head up  Psychiatric:  Speech:  clear, coherent, regular rate, rhythm, and volume,   No pressure speech noted.  Associations:  no loose associations  Thought Process:   logical, linear and goal oriented  Thought Content:    No evidence of suicidal ideation or homicidal ideation, no evidence of psychotic thought, no auditory hallucinations present and no visual hallucinations present  Mood:  depressed  Affect:   full range/stable (normal variation of emotions during exam) and was congruent to speech content.  Insight:  good  Judgment:  intact, adequate for safety  Impulse Control:  intact  Neurological:  Oriented to:  person, place, time, and situation  Attention Span and Concentration:  Able to attend to the interview      Language: intact     Recent and Remote Memory:  Intact to interview. Not formally assessed. No amnesia.    Fund of Knowledge: appropriate      SAFETY   Feels safe in home: YES     Suicidal ideation: Denies  History of suicide attempts:  No   Hx of impulsivity: No   Hope for the future: present    Hx of Command hallucinations or current psychosis: None endorsed    History of Self-injurious behaviors: denies   Family member  by suicide:  not discussed      SAFETY ASSESSMENT:   Based on all available evidence including the factors cited above, overall Risk for harm is low and is appropriate for outpatient level of care.   Recommended that patient call 911 or go to the local ED should there be a change in any of these risk factors.          LANGUAGE OR COMMUNICATION BARRIERS   Are there language or communication issues or need for modification in treatment? NO     Are there ethnic, cultural or Yazidi factors that may be relevant for therapy? NO      Client identified their preferred language to be fluent English in conversational context  Does the client need the assistance of an  or other support involved in therapy? NO       DSM 5 DIAGNOSIS:   Attention-Deficit/Hyperactivity Disorder  314.00 (F90.0) Predominantly inattentive presentation per formal psych testing  296.31 (F33.0) Major Depressive Disorder, Recurrent Episode, Mild _  300.09 (F41.8) Other Specified Anxiety Disorder          MEDICAL COMORBIDITY IMPACTING CLINICAL PICTURE:  None noted.       ASSESSMENT AND PLAN    Vidhi Tovar is a 42 year old White Not  or  female presenting for psychiatric evaluation and  medication management through [facility].  Information is obtained from patient and available records.  Reports history of depression, anxiety and newly formal diagnosis of ADHD.  Denies prior psychiatric hospitalizations. No history of suicidal thoughts or attempts. No history of self-injurious behaviors. No identified genetic load for psychiatric illnesses. Grew up in an intact home with all basic needs being met.         Problem List Items Addressed This Visit        Behavioral     Start bupropion  mg targeting depression and executive functioning impairment (inability to focus, impaired organization and planning, reduced working memory). Using small amount of alprazolam for panic appropriately    Follow-up in 4 weeks            ADHD (attention deficit hyperactivity disorder), inattentive type - Primary    Relevant Medications    buPROPion (WELLBUTRIN XL) 150 MG 24 hr tablet    Depression, unspecified depression type    Relevant Medications    buPROPion (WELLBUTRIN XL) 150 MG 24 hr tablet    ALPRAZolam (XANAX) 0.25 MG tablet   Other Visit Diagnoses     Anxiety        Relevant Medications    buPROPion (WELLBUTRIN XL) 150 MG 24 hr tablet    ALPRAZolam (XANAX) 0.25 MG tablet           CONSULTS/REFERRALS:   Continue therapy   Coordinate care with therapist as needed    MEDICAL:   None at this time  Coordinate care with PCP (Tracy Ryan) as needed  Follow up with primary care provider as planned or for acute medical concerns.    PSYCHOEDUCATION:  Medication side effects and alternatives reviewed. Health promotion activities recommended and reviewed today. All questions addressed. Education and counseling completed regarding risks and benefits of medications and psychotherapy options.  Consent provided by patient/guardian  Call the psychiatric nurse line with medication questions or concerns at [phone].  MyChart may be used to communicate with your provider, but this is  not intended to be used for emergencies.  BEERS CRITERIA: The American Geriatrics Society (AGS) released its second updated and expanded Beers Criteria - lists of potentially inappropriate medications for older adults - and one of the most frequently cited reference tools in the field of geriatrics. The Society also unveiled a suite of new  resources - including a list of alternative therapies for potentially inappropriate medications and more detailed guidance on best practices for implementing AGS recommendations.  Discussed with client.   Obsorb.gov is information for patients.  It is run by the [employer] of [employer] and it contains information about all disorders, diseases and all medications.      COMMUNITY RESOURCES:    CRISIS NUMBERS: Provided in AVS 2022  National Suicide Prevention Lifeline: 1-898-475-TALK (016-130-1074)  TRAN.SL/resources for a list of additional resources (SOS)            [facility] - [phone]   Urgent Care Adult Mental [facility]-[phone] mobile unit/  crisis line  [facility] -[phone]   COPE  [place] Mobile Team -[phone] (adults)/ [phone] (child)  Poison Control Center - 1-700.512.8259    OR  go to nearest ER  Crisis Text Line for any crisis  send this-   To: 819422   [facility]  [phone]  National Suicide Prevention Lifeline: 639.916.1074 (TTY: 500.323.4355). Call anytime for help.  (www.suicidepreventionlifeline.org)  National [place] on Mental Illness (www.jenn.org): 235-469-1475 or 884-862-7124.   Mental Health Association (www.mentalhealth.org): 911.607.7008 or 933-046-5562.  [place] Crisis Text Line: Text [place] to 199624  Suicide LifeLine Chat: suicideKenshoo.org/chat    ADMINISTRATIVE BILLIN min spent on the date of the encounter in chart review, patient visit, review of tests, documentation, care coordination, and/or  discussion with other providers about the issues documented above.    Video/Phone Start Time:  0855  Video/Phone End Time:  0941    Greater than 50% of time was spent in counseling and coordination of care regarding above diagnoses and treatment plan.     Patient Status:  Our psychiatry providers act as a specialty service for Primary Care Providers in the [facility] that seek to optimize medications for unstable patients.  Once medications have been optimized, our providers discharge the patient back to the referring Primary Care Provider for ongoing medication management.  This type of system allows our providers to serve a high volume of patients. At this time  Patient will continue to be seen for ongoing consultation and stabilization.    Signed:   Venice Alejandra, TOMMY, APRN, FMMARVINP-[facility] ([facility])   Chart documentation done in part with Dragon Voice Recognition software.  Although reviewed after completion, some word and grammatical errors may remain.

## 2022-12-14 NOTE — PATIENT INSTRUCTIONS
**For crisis resources, please see the information at the end of this document**     Thank you for coming to the [facility] [place] [facility].    TREATMENT PLAN:    MEDICATIONS:   - Start bupropion  mg targeting depression and executive functioning impairment (inability to focus, impaired organization and planning, reduced working memory). Using small amount of alprazolam for panic appropriately       CONSULTS/REFERRALS:   Continue therapy     LABS/PROCEDURES: none at present   Please call your [facility] clinic and ask for a lab only appointment at your earliest convenience.  If your results are reassuring or normal they will be mailed to you or sent through [email or web address] within 7 days. If the lab tests need quick action we will call you with the results. The phone number we will call with results is # [phone].      FOLLOW UP: Schedule an appointment with me in four weeks  or sooner as needed.  The intake team should be calling you to schedule.  If you dont hear from them, or they were unable to reach you, please call [phone] to schedule.  Follow up with primary care provider as planned or for acute medical concerns.  Call the psychiatric nurse line with medication questions or concerns at [phone].      Medication Refills:  If you need any refills please call your pharmacy and they will contact us. Our fax number for refills is [phone]. Please allow three business for refill processing. If you need to  your refill at a new pharmacy, please contact the new pharmacy directly. The new pharmacy will help you get your medications transferred.     [employer] Assistance [phone]  Financial Assistance [phone]  [facility] Billing [phone]  Central Billing Office, [facility]: [phone]  [facility] Billing [phone]  Medical Records [phone]  [facility] Patient Bill of Rights [email or web address]        MENTAL HEALTH CRISIS RESOURCES:  For a emergency help, please call 911 or go to the nearest Emergency Department.     Emergency Walk-In Options:   EmPATH Unit @ [facility] ([place]): [phone] - Specialized mental health emergency area designed to be calming  [facility] ([place]): [phone]  [facility] ([place]): [phone]  [facility]): [phone]    National Crisis Information: Call 988 Suicide and Crisis Lifeline  Crisis Text Line (free 24/7):  call **CRISIS (**995432) Crisis or use the texting option by texting 807479.   National Suicide Prevention Lifeline: Call 988  Poison Control Center: 5-162-733-3536  Trans Lifeline: 5-927-344-5302 - Hotline for transgender people of all ages  The [employer]: 4-202-670-3255 - Hotline for LGBT youth   List of all [facility] resources:   https://[place].gov/dhs/people-we-serve/adults/health-care/mental-health/resources/crisis-contacts.jsp    For Non-Emergency Support:   Fast Tracker: Mental Health & Substance Use Disorder Resources -   https://www.fasttrackermn.org/       Again thank you for choosing [facility] [place] [facility] and please let us know how we can best partner with you to improve you and your family's health.    You may be receiving a survey regarding this appointment. We would love to have your feedback, both positive and negative. The survey is done by an external company, so your answers are anonymous.

## 2022-12-14 NOTE — ASSESSMENT & PLAN NOTE
Start bupropion  mg targeting depression and executive functioning impairment (inability to focus, impaired organization and planning, reduced working memory). Using small amount of alprazolam for panic appropriately    Follow-up in 4 weeks

## 2022-12-20 ENCOUNTER — VIRTUAL VISIT (OUTPATIENT)
Dept: PSYCHOLOGY | Facility: CLINIC | Age: 42
End: 2022-12-20
Payer: COMMERCIAL

## 2022-12-20 DIAGNOSIS — F41.8 OTHER SPECIFIED ANXIETY DISORDERS: ICD-10-CM

## 2022-12-20 DIAGNOSIS — F33.0 MILD EPISODE OF RECURRENT MAJOR DEPRESSIVE DISORDER (H): ICD-10-CM

## 2022-12-20 DIAGNOSIS — F90.2 ATTENTION DEFICIT HYPERACTIVITY DISORDER (ADHD), COMBINED TYPE: Primary | ICD-10-CM

## 2022-12-20 PROCEDURE — 90832 PSYTX W PT 30 MINUTES: CPT | Mod: 95

## 2022-12-20 NOTE — PROGRESS NOTES
[facility]                                     Progress Note    Patient Name: Vidhi Tovar  Date: 12/20/2022         Service Type: Individual      Session Start Time: 10:31 AM  Session End Time: 10:57 AM     Session Length: 16-37 min    Session #: 6    Attendees: Client attended alone    Service Modality:  Video Visit:      Provider verified identity through the following two step process.  Patient provided:  Patient is known previously to provider    Telemedicine Visit: The patient's condition can be safely assessed and treated via synchronous audio and visual telemedicine encounter.      Reason for Telemedicine Visit: Patient has requested telehealth visit    Originating Site (Patient Location): Patient's home    Distant Site (Provider Location): Provider Remote Setting- Home Office    Consent:  The patient/guardian has verbally consented to: the potential risks and benefits of telemedicine (video visit) versus in person care; bill my insurance or make self-payment for services provided; and responsibility for payment of non-covered services.     Patient would like the video invitation sent by:  My Chart    Mode of Communication:  Video Conference via [employer] includes the [facility]    Distant Location (Provider):  Off-site    As the provider I attest to compliance with applicable laws and regulations related to telemedicine.    DATA  Interactive Complexity: No  Crisis: No        Progress Since Last Session (Related to Symptoms / Goals / Homework):   Symptoms: No change based on patient self-report. Patient endorses symptoms of  feeling hopeless, feeling tired, low self-worth, feeling anxious, difficulty controlling worry, worrying about different things, difficulty relaxing, and irritability.     Homework: Partially completed      Episode of Care Goals: Satisfactory progress - ACTION (Actively working towards change); Intervened by reinforcing change plan / affirming steps taken     Current / Ongoing Stressors  "and Concerns:   Patient reports she is stressed about work. Patient reports she is concerned about her parents and them aging. Patient reports she is concerned about \"the world\". Patient reports she is concerned about her interpersonal relationships. Patient reports she is concerned about organization skills. Patient reports she is concerned about how her ADHD impacts social relationships. Patient reports she experienced an interpersonal conflict on her vacation. Patient reports feeling concerned about taking a trip with her family. Patient reports concerns about rejection sensitivity. Patient reports she is concerned about her brother and his family.      Treatment Objective(s) Addressed in This Session:    Patient will learn about the diagnosis and symptoms of ADHD.     Patient will learn how ADHD can impact interpersonal relationships and social skills.    Client will learn about co-morbidities between ADHD and anxiety disorders.    Client will identify triggers of anxiety and process them in session.       Intervention:   Motivational Interviewing: supported patient in processing and exploring her concerns about her brother and his family. Validated patient's emotions. Discussed how patient is not responsible for others' emotions and behaviors. Supported patient in exploring what she would like to say to her brother. Supported patient in exploring what her goal with this conversation is. Provider encouraged patient to speak with her psychiatrist about medication concerns. Patient requested to leave session early due to a family emergency.     Assessments completed prior to visit:  The following assessments were completed by patient for this visit:  PHQ9:   PHQ-9 SCORE 9/28/2022 10/31/2022 12/2/2022   PHQ-9 Total Score 6 15 3     GAD7:   DIONNE-7 SCORE 6/22/2022 6/22/2022 9/28/2022 10/31/2022 12/2/2022   Total Score - 5 (mild anxiety) - - -   Total Score 5 5 4 13 6     PROMIS 10-Global Health (only subscores and " total score):   PROMIS-10 Scores Only 6/28/2022 7/19/2022 10/31/2022 12/14/2022   Global Mental Health Score 11 11 13 12   Global Physical Health Score 15 16 14 16   PROMIS TOTAL - SUBSCORES 26 27 27 28         ASSESSMENT: Current Emotional / Mental Status (status of significant symptoms):   Risk status (Self / Other harm or suicidal ideation)   Patient denies current fears or concerns for personal safety.   Patient denies current or recent suicidal ideation or behaviors.   Patient denies current or recent homicidal ideation or behaviors.   Patient denies current or recent self injurious behavior or ideation.   Patient denies other safety concerns.   Patient reports there has been no change in risk factors since their last session.     Patient reports there has been no change in protective factors since their last session.     Recommended that patient call 911 or go to the local ED should there be a change in any of these risk factors.     Appearance:   Appropriate    Eye Contact:   Good    Psychomotor Behavior: Normal    Attitude:   Cooperative    Orientation:   All   Speech    Rate / Production: Hyperverbal     Volume:  Normal    Mood:    Anxious  Depressed  Sad    Affect:    Tearful Worrisome    Thought Content:  Rumination    Thought Form:  Coherent    Insight:    Good      Medication Review:   Changes to psychiatric medications, see updated Medication List in EPIC.      Medication Compliance:   Yes     Changes in Health Issues:   None reported     Chemical Use Review:   Substance Use: Problem use continues with no change since last session, Not addressed in session        Tobacco Use: No current tobacco use.      Diagnosis:  1. Attention deficit hyperactivity disorder (ADHD), combined type    2. Mild episode of recurrent major depressive disorder (H)    3. Other specified anxiety disorders        Collateral Reports Completed:   Not Applicable    PLAN: (Patient Tasks / Therapist Tasks / Other)  Patient will  practice taking breaks when feeling overwhelmed. Patient will perform a self-care activity daily. Patient will speak with her psychiatrist about medication concerns if needed.  We will discuss next session.         TAMY York [facility] 12/20/2022    _____________________________________________________                                              Individual Treatment Plan    Patient's Name: Vidhi Tovar  YOB: 1980    Date of Creation: 11/9/2022  Date Treatment Plan Last Reviewed/Revised: 11/9/2022    DSM5 Diagnoses: Attention-Deficit/Hyperactivity Disorder  314.01 (F90.2) Combined presentation, 296.31 (F33.0) Major Depressive Disorder, Recurrent Episode, Mild _ or 300.09 (F41.8) Other Specified Anxiety Disorder   Psychosocial / Contextual Factors: Patient is self-employed. Patient is single. Patient is taking care of her elderly parents.   PROMIS (reviewed every 90 days):   The following assessments were completed by patient for this visit:  PROMIS 10-Global Health (only subscores and total score):   PROMIS-10 Scores Only 6/28/2022 7/19/2022 10/31/2022 12/14/2022   Global Mental Health Score 11 11 13 12   Global Physical Health Score 15 16 14 16   PROMIS TOTAL - SUBSCORES 26 27 27 28       Referral / Collaboration:  Referral to another professional/service is not indicated at this time..    Anticipated number of session for this episode of care: 9-12 sessions  Anticipation frequency of session: Weekly  Anticipated Duration of each session: 38-52 minutes  Treatment plan will be reviewed in 90 days or when goals have been changed.       MeasurableTreatment Goal(s) related to diagnosis / functional impairment(s)  Goal 1:Patient will work on stabilizing symptoms of ADHD.      Objective #A  Patient will learn about the diagnosis and symptoms of ADHD.   Status: New - Date: 11/9/2022     Intervention(s)  Therapist will teach about the diagnosis of ADHD.     Objective #B  Patient will learn 3  skills to improve organization.                        Status: New - Date: 11/9/2022     Intervention(s)  Therapist will teach organizational skills.      Objective #C   Patient will learn how ADHD can impact interpersonal relationships and social skills.   Status: New - Date: 11/9/2022     Intervention(s)  Therapist will teach about the symptoms of ADHD and how a neuro-divergent brain tries to build social connections.        Goal 2: Client will work on stabilizing anxiety symptoms as evidenced by DIONNE-7 scores (current is 13) and Self report.  Goal is to reduce by five points.      Objective #A Client will identify triggers of anxiety and process them in session.    Status: New - Date: 11/9/2022    Intervention(s)  Therapist will teach emotional recognition/identification by assigning homework to journal with written exercises.    Objective #B  Client will identify initial signs or symptoms of anxiety.    Status: New - Date: 11/9/2022    Intervention(s)  Therapist will teach emotional regulation skills, such as deep breathing and mindfulness skills.    Objective #C  Client will learn about co-morbidities between ADHD and anxiety disorders.   Status: New - Date: 11/9/2022    Intervention(s)  Therapist will provide educational materials on anxiety disorders and ADHD.          Patient has reviewed and agreed to the above plan.      TAMY York LICSW  November 9, 2022    Trent Ceballos  LICSW  12/23/2022

## 2023-01-03 ENCOUNTER — VIRTUAL VISIT (OUTPATIENT)
Dept: PSYCHOLOGY | Facility: CLINIC | Age: 43
End: 2023-01-03
Payer: COMMERCIAL

## 2023-01-03 DIAGNOSIS — F33.0 MILD EPISODE OF RECURRENT MAJOR DEPRESSIVE DISORDER (H): ICD-10-CM

## 2023-01-03 DIAGNOSIS — F90.2 ATTENTION DEFICIT HYPERACTIVITY DISORDER (ADHD), COMBINED TYPE: Primary | ICD-10-CM

## 2023-01-03 DIAGNOSIS — F41.8 OTHER SPECIFIED ANXIETY DISORDERS: ICD-10-CM

## 2023-01-03 PROCEDURE — 90834 PSYTX W PT 45 MINUTES: CPT | Mod: 95

## 2023-01-03 ASSESSMENT — ANXIETY QUESTIONNAIRES
3. WORRYING TOO MUCH ABOUT DIFFERENT THINGS: SEVERAL DAYS
5. BEING SO RESTLESS THAT IT IS HARD TO SIT STILL: SEVERAL DAYS
2. NOT BEING ABLE TO STOP OR CONTROL WORRYING: NOT AT ALL
GAD7 TOTAL SCORE: 5
7. FEELING AFRAID AS IF SOMETHING AWFUL MIGHT HAPPEN: SEVERAL DAYS
1. FEELING NERVOUS, ANXIOUS, OR ON EDGE: SEVERAL DAYS
GAD7 TOTAL SCORE: 5
4. TROUBLE RELAXING: NOT AT ALL
IF YOU CHECKED OFF ANY PROBLEMS ON THIS QUESTIONNAIRE, HOW DIFFICULT HAVE THESE PROBLEMS MADE IT FOR YOU TO DO YOUR WORK, TAKE CARE OF THINGS AT HOME, OR GET ALONG WITH OTHER PEOPLE: SOMEWHAT DIFFICULT
6. BECOMING EASILY ANNOYED OR IRRITABLE: SEVERAL DAYS

## 2023-01-03 ASSESSMENT — PATIENT HEALTH QUESTIONNAIRE - PHQ9: SUM OF ALL RESPONSES TO PHQ QUESTIONS 1-9: 6

## 2023-01-03 NOTE — PROGRESS NOTES
[facility]                                     Progress Note    Patient Name: Vidhi Tovar  Date: 1/3/2023         Service Type: Individual      Session Start Time: 11:30 AM  Session End Time: 12:19 PM     Session Length: 38-52 min    Session #: 7    Attendees: Client attended alone    Service Modality:  Video Visit:      Provider verified identity through the following two step process.  Patient provided:  Patient is known previously to provider    Telemedicine Visit: The patient's condition can be safely assessed and treated via synchronous audio and visual telemedicine encounter.      Reason for Telemedicine Visit: Patient has requested telehealth visit    Originating Site (Patient Location): Patient's home    Distant Site (Provider Location): Provider Remote Setting- Home Office    Consent:  The patient/guardian has verbally consented to: the potential risks and benefits of telemedicine (video visit) versus in person care; bill my insurance or make self-payment for services provided; and responsibility for payment of non-covered services.     Patient would like the video invitation sent by:  My Chart    Mode of Communication:  [facility]    Distant Location (Provider):  Off-site    As the provider I attest to compliance with applicable laws and regulations related to telemedicine.    DATA  Interactive Complexity: No  Crisis: No        Progress Since Last Session (Related to Symptoms / Goals / Homework):   Symptoms: Worsening symptoms of depression and no change in symptoms of anxiety based on PHQ-9 and DIONNE-7 scores and patient self-report. Patient endorses symptoms of little interest in doing things, feeling hopeless, fluctuating appetite,  low self-worth, difficulty concentrating, psychomotor agitation, feeling anxious, worrying about different things, restlessness, feeling afraid something awful might happen, and irritability.     Homework: Partially  "completed      Episode of Care Goals: Satisfactory progress - ACTION (Actively working towards change); Intervened by reinforcing change plan / affirming steps taken     Current / Ongoing Stressors and Concerns:   Patient reports she is stressed about work. Patient reports she is concerned about her parents and them aging. Patient reports she is concerned about \"the world\". Patient reports she is concerned about her interpersonal relationships. Patient reports she is concerned about organization skills. Patient reports she is concerned about how her ADHD impacts social relationships. Patient reports she experienced an interpersonal conflict on her vacation.Patient reports concerns about rejection sensitivity. Patient reports she is concerned about her brother and his family. Patient reports she is concerned about the role she plays in her family dynamic.      Treatment Objective(s) Addressed in This Session:    Patient will learn about the diagnosis and symptoms of ADHD.     Patient will learn how ADHD can impact interpersonal relationships and social skills.    Client will learn about co-morbidities between ADHD and anxiety disorders.    Client will identify triggers of anxiety and process them in session.       Intervention:   DBT: taught about the 3 states of mind. Coached patient in labeling her state of mind.   Motivational Interviewing: supported patient in processing and exploring an internal conflict. Validated patient's emotions. Discussed how patient is not responsible for others' emotions and behaviors. Discussed how ADHD can shape emotional reactions to stimuli. Supported patient in exploring avoidant behaviors. Discussed how behaviors are learned. Supported patient in exploring what she learned from her parents regarding emotion regulation and conflict resolution. Reviewed the importance of self-care.     Assessments completed prior to visit:  The following assessments were completed by patient for this " visit:  PHQ9:   PHQ-9 SCORE 9/28/2022 10/31/2022 12/2/2022 1/3/2023   PHQ-9 Total Score 6 15 3 6     GAD7:   DIONNE-7 SCORE 6/22/2022 6/22/2022 9/28/2022 10/31/2022 12/2/2022 1/3/2023   Total Score - 5 (mild anxiety) - - - -   Total Score 5 5 4 13 6 5     PROMIS 10-Global Health (only subscores and total score):   PROMIS-10 Scores Only 6/28/2022 7/19/2022 10/31/2022 12/14/2022   Global Mental Health Score 11 11 13 12   Global Physical Health Score 15 16 14 16   PROMIS TOTAL - SUBSCORES 26 27 27 28         ASSESSMENT: Current Emotional / Mental Status (status of significant symptoms):   Risk status (Self / Other harm or suicidal ideation)   Patient denies current fears or concerns for personal safety.   Patient denies current or recent suicidal ideation or behaviors.   Patient denies current or recent homicidal ideation or behaviors.   Patient denies current or recent self injurious behavior or ideation.   Patient denies other safety concerns.   Patient reports there has been no change in risk factors since their last session.     Patient reports there has been no change in protective factors since their last session.     Recommended that patient call 911 or go to the local ED should there be a change in any of these risk factors.     Appearance:   Appropriate    Eye Contact:   Good    Psychomotor Behavior: Normal    Attitude:   Cooperative    Orientation:   All   Speech    Rate / Production: Hyperverbal     Volume:  Normal    Mood:    Anxious  Depressed  Sad    Affect:    Worrisome    Thought Content:  Rumination    Thought Form:  Coherent    Insight:    Good      Medication Review:   No changes to current psychiatric medication(s)     Medication Compliance:   Yes     Changes in Health Issues:   None reported     Chemical Use Review:   Substance Use: Problem use continues with no change since last session, Not addressed in session        Tobacco Use: No current tobacco use.      Diagnosis:  1. Attention deficit  hyperactivity disorder (ADHD), combined type    2. Mild episode of recurrent major depressive disorder (H)    3. Other specified anxiety disorders        Collateral Reports Completed:   Not Applicable    PLAN: (Patient Tasks / Therapist Tasks / Other)  Patient will practice labeling her state of mind daily. Patient will perform self-care activities daily.   We will discuss next session.         TAMY York LICSW 1/3/2023    _____________________________________________________                                              Individual Treatment Plan    Patient's Name: Vidhi Tovar  YOB: 1980    Date of Creation: 11/9/2022  Date Treatment Plan Last Reviewed/Revised: 11/9/2022    DSM5 Diagnoses: Attention-Deficit/Hyperactivity Disorder  314.01 (F90.2) Combined presentation, 296.31 (F33.0) Major Depressive Disorder, Recurrent Episode, Mild _ or 300.09 (F41.8) Other Specified Anxiety Disorder   Psychosocial / Contextual Factors: Patient is self-employed. Patient is single. Patient is taking care of her elderly parents.   PROMIS (reviewed every 90 days):   The following assessments were completed by patient for this visit:  PROMIS 10-Global Health (only subscores and total score):   PROMIS-10 Scores Only 6/28/2022 7/19/2022 10/31/2022 12/14/2022   Global Mental Health Score 11 11 13 12   Global Physical Health Score 15 16 14 16   PROMIS TOTAL - SUBSCORES 26 27 27 28       Referral / Collaboration:  Referral to another professional/service is not indicated at this time..    Anticipated number of session for this episode of care: 9-12 sessions  Anticipation frequency of session: Weekly  Anticipated Duration of each session: 38-52 minutes  Treatment plan will be reviewed in 90 days or when goals have been changed.       MeasurableTreatment Goal(s) related to diagnosis / functional impairment(s)  Goal 1:Patient will work on stabilizing symptoms of ADHD.      Objective #A  Patient will learn about  the diagnosis and symptoms of ADHD.   Status: New - Date: 11/9/2022     Intervention(s)  Therapist will teach about the diagnosis of ADHD.     Objective #B  Patient will learn 3 skills to improve organization.                        Status: New - Date: 11/9/2022     Intervention(s)  Therapist will teach organizational skills.      Objective #C   Patient will learn how ADHD can impact interpersonal relationships and social skills.   Status: New - Date: 11/9/2022     Intervention(s)  Therapist will teach about the symptoms of ADHD and how a neuro-divergent brain tries to build social connections.        Goal 2: Client will work on stabilizing anxiety symptoms as evidenced by DIONNE-7 scores (current is 13) and Self report.  Goal is to reduce by five points.      Objective #A Client will identify triggers of anxiety and process them in session.    Status: New - Date: 11/9/2022    Intervention(s)  Therapist will teach emotional recognition/identification by assigning homework to journal with written exercises.    Objective #B  Client will identify initial signs or symptoms of anxiety.    Status: New - Date: 11/9/2022    Intervention(s)  Therapist will teach emotional regulation skills, such as deep breathing and mindfulness skills.    Objective #C  Client will learn about co-morbidities between ADHD and anxiety disorders.   Status: New - Date: 11/9/2022    Intervention(s)  Therapist will provide educational materials on anxiety disorders and ADHD.          Patient has reviewed and agreed to the above plan.      TAMY York LICSW  November 9, 2022    Trent Ceballos  LICSW  1/6/2023

## 2023-01-06 NOTE — PROGRESS NOTES
"[facility]   Drew 10, 2023      Behavioral Health Clinician Progress Note    Patient Name: Vidhi Tovar  \"Mindy\"          Service Type:  Individual      Service Location:   MyChart / Email (patient reached)     Session Start Time: 1230pm  Session End Time: 1246pm      Session Length: 16 - 37      Attendees: Patient     Service Modality:  Video Visit:      Provider verified identity through the following two step process.  Patient provided:  Patient  and Patient address    Telemedicine Visit: The patient's condition can be safely assessed and treated via synchronous audio and visual telemedicine encounter.      Reason for Telemedicine Visit: Services only offered telehealth    Originating Site (Patient Location): Patient's home    Distant Site (Provider Location): Provider Remote Setting- Home Office    Consent:  The patient/guardian has verbally consented to: the potential risks and benefits of telemedicine (video visit) versus in person care; bill my insurance or make self-payment for services provided; and responsibility for payment of non-covered services.     Patient would like the video invitation sent by:  My Chart    Mode of Communication:  Video Conference via [facility]    Distant Location (Provider):  Off-site    As the provider I attest to compliance with applicable laws and regulations related to telemedicine.    Visit Activities (Refresh list every visit): [facility] Only    Diagnostic Assessment Date: 2022 by Bibiana Cochran PsyD, LP at [facility]  Treatment Plan Review Date: 3/14/2023  See Flowsheets for today's PHQ-9 and DIONNE-7 results  Previous PHQ-9:   PHQ-9 SCORE 2022 1/3/2023 2023   PHQ-9 Total Score MyChart - - 6 (Mild depression)   PHQ-9 Total Score 3 6 6     Previous DIONNE-7:   DIONNE-7 SCORE 10/31/2022 2022 1/3/2023   Total Score - - -   Total Score 13 6 5       CHIN LEVEL:  No flowsheet data " found.    DATA  Extended Session (60+ minutes): No  Interactive Complexity: No  Crisis: No  [facility] Patient: No    Treatment Objective(s) Addressed in This Session:  Target Behavior(s): improve ability to function in relationships    Anxiety: will experience a reduction in anxiety    Current Stressors / Issues:   update: Pt reports that she's been taking wellbutrin for 3 weeks. States that she has has been more motivated, more focused. Irritability is more intense, less patient and having trouble finding words. She's unsure if this because of the medication or because she's doing therapy and becoming more self aware. [facility] validated and normalized. Pt's brother's drinking is causing stress for her because his wife calls her for help (they live in [place]). She recognizes that she needs to set stronger boundaries with him and has informed him that she will no longer rescue him. [facility] explored importance of speaking to his wife as well about the boundaries she (pt) is setting with her brother and with her (W-not call pt and ask her to rescue him). Challenges include not wanting to be alienated from them.    Stresses: brother's drinking behavior  Appetite: unremarkable  Sleep: stays up on purpose and recognizes that she should be getting to bed earlier  Therapy: Chaya CARRILLO at [facility] Mikaela Dhaliwal  ROBERT: states that she feels like she's drinking too much wine at night and would like to reduce her use  Preg: No  Most important: med update    Progress on Treatment Objective(s) / Homework:  Satisfactory progress - ACTION (Actively working towards change); Intervened by reinforcing change plan / affirming steps taken    Motivational Interviewing    MI Intervention: Co-Developed Goal: improve relationships, Expressed Empathy/Understanding, Open-ended questions, Reflections: simple and complex, Change talk (evoked) and Reframe     Change Talk Expressed by the Patient: Desire to change Ability to change Reasons  to change Need to change Committment to change Activation Taking steps    Provider Response to Change Talk: E - Evoked more info from patient about behavior change, A - Affirmed patient's thoughts, decisions, or attempts at behavior change, R - Reflected patient's change talk and S - Summarized patient's change talk statements      Care Plan review completed: Yes    Medication Review:  No changes to current psychiatric medication(s)    Medication Compliance:  Yes    Changes in Health Issues:   None reported    Chemical Use Review:   Substance Use: Problem use continues with no change since last session, Stage of Change: Contemplation        Tobacco Use: No current tobacco use.      Assessment: Current Emotional / Mental Status (status of significant symptoms):  Risk status (Self / Other harm or suicidal ideation)  Patient denies a history of suicidal ideation, suicide attempts, self-injurious behavior, homicidal ideation, homicidal behavior and and other safety concerns  Patient denies current fears or concerns for personal safety.  Patient denies current or recent suicidal ideation or behaviors.  Patient denies current or recent homicidal ideation or behaviors.  Patient denies current or recent self injurious behavior or ideation.  Patient denies other safety concerns.  A safety and risk management plan has not been developed at this time, however patient was encouraged to call [place] [phone] should there be a change in any of these risk factors.    Appearance:   Appropriate   Eye Contact:   Good   Psychomotor Behavior: Normal   Attitude:   Cooperative   Orientation:   All  Speech   Rate / Production: Normal    Volume:  Normal   Mood:    Normal  Affect:    Appropriate  Tearful  Thought Content:  Clear   Thought Form:  Coherent  Logical   Insight:    Good     Diagnoses:  1. Generalized anxiety disorder    2. Depression, major, recurrent, moderate (H)    3. Attention deficit hyperactivity disorder (ADHD),  combined type        Collateral Reports Completed:  Collaborated with [facility] team    Plan: (Homework, other):  Patient was given information about behavioral services and encouraged to schedule a follow up appointment with the clinic [facility] in 1 month.  She was also given information about mental health symptoms and treatment options .  CD Recommendations: [facility] will continue to monitor use. Marisabel Gonsalestamia [facility]         ______________________________________________________________________    Integrated Primary Care Behavioral Health Treatment Plan    Patient's Name: Vidhi Tovar  YOB: 1980    Date of Creation: 12/14/2022  Date Treatment Plan Last Reviewed/Revised: pending    DSM5 Diagnoses: 300.02 (F41.1) Generalized Anxiety Disorder  Psychosocial / Contextual Factors: relationship changes  PROMIS (reviewed every 90 days):     Referral / Collaboration:  Referral to another professional/service is not indicated at this time..    Anticipated number of session for this episode of care: 10-12  Anticipation frequency of session: Monthly  Anticipated Duration of each session: 16-37 minutes  Treatment plan will be reviewed in 90 days or when goals have been changed.       MeasurableTreatment Goal(s) related to diagnosis / functional impairment(s)  Goal 1: Patient will experience improved anxiety    I will know I've met my goal when regular sleep, desire to do things, ability to work out, more mental energy.      Objective #A (Patient Action)    Patient will use at least 4 coping skills for anxiety management in the next 4 weeks.  Status: New - Date: 12/14/2022     Intervention(s)  Therapist will teach emotional regulation skills. to manage anxiety.        Patient has reviewed and agreed to the above plan.      Marisabel Chowdary, [facility]  Drew 10, 2023

## 2023-01-09 ASSESSMENT — PATIENT HEALTH QUESTIONNAIRE - PHQ9
SUM OF ALL RESPONSES TO PHQ QUESTIONS 1-9: 6
SUM OF ALL RESPONSES TO PHQ QUESTIONS 1-9: 6
10. IF YOU CHECKED OFF ANY PROBLEMS, HOW DIFFICULT HAVE THESE PROBLEMS MADE IT FOR YOU TO DO YOUR WORK, TAKE CARE OF THINGS AT HOME, OR GET ALONG WITH OTHER PEOPLE: SOMEWHAT DIFFICULT

## 2023-01-10 ENCOUNTER — VIRTUAL VISIT (OUTPATIENT)
Dept: PSYCHIATRY | Facility: CLINIC | Age: 43
End: 2023-01-10
Payer: COMMERCIAL

## 2023-01-10 ENCOUNTER — VIRTUAL VISIT (OUTPATIENT)
Dept: BEHAVIORAL HEALTH | Facility: CLINIC | Age: 43
End: 2023-01-10
Payer: COMMERCIAL

## 2023-01-10 DIAGNOSIS — F90.0 ADHD (ATTENTION DEFICIT HYPERACTIVITY DISORDER), INATTENTIVE TYPE: ICD-10-CM

## 2023-01-10 DIAGNOSIS — F90.2 ATTENTION DEFICIT HYPERACTIVITY DISORDER (ADHD), COMBINED TYPE: ICD-10-CM

## 2023-01-10 DIAGNOSIS — F41.1 GENERALIZED ANXIETY DISORDER: Primary | ICD-10-CM

## 2023-01-10 DIAGNOSIS — F33.1 DEPRESSION, MAJOR, RECURRENT, MODERATE (H): ICD-10-CM

## 2023-01-10 DIAGNOSIS — F33.1 MODERATE EPISODE OF RECURRENT MAJOR DEPRESSIVE DISORDER (H): Primary | ICD-10-CM

## 2023-01-10 PROCEDURE — 90832 PSYTX W PT 30 MINUTES: CPT | Mod: 95 | Performed by: SOCIAL WORKER

## 2023-01-10 PROCEDURE — 99213 OFFICE O/P EST LOW 20 MIN: CPT | Mod: 95 | Performed by: NURSE PRACTITIONER

## 2023-01-10 RX ORDER — BUPROPION HYDROCHLORIDE 150 MG/1
150 TABLET ORAL EVERY MORNING
Qty: 30 TABLET | Refills: 0 | Status: SHIPPED | OUTPATIENT
Start: 2023-01-10 | End: 2023-02-14

## 2023-01-10 NOTE — PATIENT INSTRUCTIONS
**For crisis resources, please see the information at the end of this document**     Thank you for coming to the [facility] MENTAL HEALTH & ADDICTION [facility].    TREATMENT PLAN:    MEDICATIONS:   - continue bupropion  mg       CONSULTS/REFERRALS:   Continue therapy     LABS/PROCEDURES: none at present   Please call your [place] clinic and ask for a lab only appointment at your earliest convenience.  If your results are reassuring or normal they will be mailed to you or sent through [employer] within 7 days. If the lab tests need quick action we will call you with the results. The phone number we will call with results is # [phone].      FOLLOW UP: Schedule an appointment with me in six weeks  or sooner as needed.  The intake team should be calling you to schedule.  If you dont hear from them, or they were unable to reach you, please call [phone] to schedule.  Follow up with primary care provider as planned or for acute medical concerns.  Call the psychiatric nurse line with medication questions or concerns at [phone].      Medication Refills:  If you need any refills please call your pharmacy and they will contact us. Our fax number for refills is [phone]. Please allow three business for refill processing. If you need to  your refill at a new pharmacy, please contact the new pharmacy directly. The new pharmacy will help you get your medications transferred.     [facility] Assistance [phone]  Financial Assistance [phone]  [facility] Billing [phone]  Central Billing Office, [facility]: [phone]  [place] Billing [phone]  Medical Records [phone]  [place] Patient Bill of Rights [email or web address]       MENTAL HEALTH CRISIS RESOURCES:  For a emergency help, please call 911 or go to the nearest Emergency Department.     Emergency Walk-In Options:   EmPATH Unit @ [facility] ([place]):  [phone] - Specialized mental health emergency area designed to be calming  [facility] ([place]): [phone]  [facility] ([place]): [phone]  [facility] ([place]): [phone]    National Crisis Information: Call 988 Suicide and Crisis Lifeline  Crisis Text Line (free 24/7):  call **CRISIS (**625646) Crisis or use the texting option by texting 411925.   National Suicide Prevention Lifeline: Call 988  Poison Control Center: 8-731-484-1603  Trans Lifeline: 7-324-647-7376 - Hotline for transgender people of all ages  The [employer] Project: 2-790-063-3895 - Hotline for LGBT youth   List of all [facility] resources:   https://[place].gov/dhs/people-we-serve/adults/health-care/mental-health/resources/crisis-contacts.jsp    For Non-Emergency Support:   Fast Tracker: Mental Health & Substance Use Disorder Resources -   https://www.fasttrackermn.org/       Again thank you for choosing [facility] M[facility] and please let us know how we can best partner with you to improve you and your family's health.    You may be receiving a survey regarding this appointment. We would love to have your feedback, both positive and negative. The survey is done by an external company, so your answers are anonymous.

## 2023-01-10 NOTE — PROGRESS NOTES
PSYCHIATRIC MEDICATION FOLLOW UP APPT     Name:  Vidhi Tovar  : 1980      Vidhi Tovar is a 42 year old female who is being evaluated via a billable video visit.      How would you like to obtain your AVS? MyChart  If the video visit is dropped, the invitation should be resent by: Text to cell phone: [phone]  Will anyone else be joining your video visit? No     [place] If not at [place] address below, please ask where they are in case of an emergency situation arises during the appointment.  [address]  [facility] [address]        Telemedicine Visit: The patient's condition can be safely assessed and treated via synchronous audio and visual telemedicine encounter.       Reason for Telemedicine Visit: COVID 19 pandemic and the social and physical recommendations by the CDC and MDH.       Originating Site (Patient Location): Patient's home     Distant Site (Provider Location): Provider Remote Setting     Consent:  The patient/guardian has verbally consented to: the potential risks and benefits of telemedicine (video visit or phone) versus in person care; bill my insurance or make self-payment for services provided; and responsibility for payment of non-covered services.      Mode of Communication:  [employer] video platform      As the provider I attest to compliance with applicable laws and regulations related to telemedicine.     IDENTIFICATION   Referred by: Tracy Ryan NP [facility]      Patient Care Team:  Tracy Ryan NP as PCP - General (Primary Care - [facility])  Bibiana Cochran, PhD LP as Assigned Behavioral Health Provider  Chaya Harmon MSW as Therapist  Tracy Ryan NP as Nurse Practitioner (Primary Care - [facility])  Therapist: raheel in individual therapy for the past 2 years      Patient attended the phone/video session alone.    Last seen for outpatient psychiatry Return Visit on 2022.      FOLLOWING PLAN PUT INTO PLACE: Start  bupropion  mg targeting depression and executive functioning impairment (inability to focus, impaired organization and planning, reduced working memory). Using small amount of alprazolam for panic appropriately     Follow-up in 4 weeks      INTERIM HISTORY     COMMUNICATIONS FROM PATIENT VIA:  none    RECORDS AVAILABLE FOR REVIEW: EHR records through [facility]  and previous psychiatric progress note.  In addition, reviewed the assessment completed by Marisabel Chowdary [facility], dated today        Psychiatric testing completed within [facility]  Diagnosis include:  Major Depressive Disorder, Recurrent, Mild   Other Anxiety Disorder   Attention Deficit Hyperactivity Disorder   Alcohol Use Disorder  History of Stimulant Use      HISTORY OF PRESENT ILLNESS   [facility] referral for psychiatric medication consult in December 2022.  Reports history of depression, anxiety and newly formal diagnosis of ADHD through psychiatric testing within [facility].  Denies prior psychiatric hospitalizations. No history of suicidal thoughts or attempts. No history of self-injurious behaviors. No identified genetic load for psychiatric illnesses. Grew up in an intact home with all basic needs being met.     Reports she has typically stayed away from medications.  She was referred for psychiatric testing due to poor concentration and inattention.    Ms. Tovar indicated she has been e. She stated she has been prescribed Xanax to manage anxiety symptoms; diagnostic assessment has not been completed. She stated that her therapist recommended an evaluation to assess  baseline  functioning and she expressed concern regarding  impulse control.  She questioned if ADHD is present because she  zones out  and has difficulty completing tasks. She highlighted she was given academic accommodations as a youth without a formal diagnosis. She acknowledged that symptoms impair her functioning at home and work as well as in social relationships.      Elementary school:  At age 8 teachers reached out to mother to have tested for ADHD but mother did not follow through.    [employer]: report card stated chatting a lot, academics was adequate but doesn't recall doing much homework  [employer]: essentially the same as [employer]     Endorses often or very often experiencing the following:   -having trouble wrapping up the final details for a project, once the challenging parts have been done?  NO, because she doesn't get paid for work.  For other things in life, she was a procrastinator.  -Having difficulty getting things in order when they have to do a task that requires organization YES, SPECIFICALLY WHEN KEEPING HOUSE, NEEDS REMINDERS  -Having problems remembering appointments or obligations NO, USING THE CALENDAR  -Fidgets or squirms with hands or feet when you has to sit down for a long time PLAYING WITH HAIR, LOOKING OFF IN DIFFERENT DIRECTIONS  -When a task that requires a lot of thought, often avoid or delay getting started ALWAYS  -Feeling overly active and compelled to do things, as if driven by a motor IN SOME SITUATIONS   -Making careless mistakes when has to work on a boring or difficult project ALWAYS  -Having difficulty keeping attention when doing boring or repetitive work ALWAYS  -Having difficulty concentrating on what people say to them, even when they are speaking directly at them ALWAYS  -misplacing or have difficulty finding things at home or at work NO  -Distracted by activity or noise around you ALWAYS  -Leaving their seat in meetings or other situations in which there is an expection to remain seated NO  -Feeling restless or fidgety SOMETIMES  -Having difficulty unwinding and relaxing with time to themselves HISTORICALLY, HAS USED ALCOHOL TO UNWIND  -Talking too much in social situations ALWAYS  -Finding themselves finishing the sentences of the people they are talking to, before  they can finish them themselves ALWAYS, TRYING TO NOT DO  "THIS  -Having difficulty waiting your turn in situations when turn taking is required NO  - Interrupting others when they are busy NO         FAMILY, MEDICAL, SURGICAL HISTORY REVIEWED.  MEDICATION HAVE BEEN REVIEWED AND ARE CURRENT TO THE BEST OF MY KNOWLEDGE AND ABILITY.  Produce political ads, which varies in intensity      MEDICATIONS                                                                                                Current Outpatient Medications   Medication Sig     ALPRAZolam (XANAX) 0.25 MG tablet Take 1 tablet (0.25 mg) by mouth 3 times daily as needed for anxiety     buPROPion (WELLBUTRIN XL) 150 MG 24 hr tablet Take 1 tablet (150 mg) by mouth every morning     levonorgestrel (KYLEENA) 19.5 MG IUD 1 each by Intrauterine route once     No current facility-administered medications for this visit.        NOTES ABOUT CURRENT PSYCHOTROPIC MEDICATIONS:   Alprazolam 0.25 mg, getting small quantity and using appropriately  Bupropion  mg     PAST PSYCHOTROPIC MEDICATIONS:  none      TODAY PATIENT REPORTS THE FOLLOWING PSYCHIATRIC ROS:   Per [facility], Marisabel Chowdary, during today's team-based visit:  \"MH update: Pt reports that she's been taking wellbutrin for 3 weeks. States that she has has been more motivated, more focused. Irritability is more intense, less patient and having trouble finding words. She's unsure if this because of the medication or because she's doing therapy and becoming more self aware. [facility] validated and normalized. Pt's brother's drinking is causing stress for her because his wife calls her for help (they live in [facility] [place] [facility] [place]). She recognizes that she needs to set stronger boundaries with him and has informed him that she will no longer rescue him. [facility] explored importance of speaking to his wife as well about the boundaries she (pt) is setting with her brother and with her (W-not call pt and ask her to rescue him). Challenges include not wanting to be alienated from them.  " "  Stresses: brother's drinking behavior  Appetite: unremarkable  Sleep: stays up on purpose and recognizes that she should be getting to bed earlier  Therapy: Chaya CARRILLO at [facility], Mikaela Cochran  ROBERT: states that she feels like she's drinking too much wine at night and would like to reduce her use  Preg: No  Most important: med update\"     EXERCISE: Adequate  SIDE EFFECTS:   tolerating medications without reported side effects  COMPLIANCE:   states Adherent to medication regimen  REPORTS THE FOLLOWING NEW MEDICAL ISSUES:   None    PROBLEM: ADHD: Improving       PROBLEM: DEPRESSION: Improving    Last PHQ-9 1/9/2023   1.  Little interest or pleasure in doing things 1   2.  Feeling down, depressed, or hopeless 1   3.  Trouble falling or staying asleep, or sleeping too much 1   4.  Feeling tired or having little energy 1   5.  Poor appetite or overeating 0   6.  Feeling bad about yourself 1   7.  Trouble concentrating 1   8.  Moving slowly or restless 0   Q9: Thoughts of better off dead/self-harm past 2 weeks 0   PHQ-9 Total Score 6   Difficulty at work, home, or with people -     PHQ-9 SCORE 12/2/2022 1/3/2023 1/9/2023   PHQ-9 Total Score MyChart - - 6 (Mild depression)   PHQ-9 Total Score 3 6 6     PHQ9 score is 6 indicating mild depression.   Suicidal ideation:  No        PERTINENT PAST MEDICAL AND SURGICAL HISTORY     Past Medical History:   Diagnosis Date     ADHD (attention deficit hyperactivity disorder)      Depressive disorder      Fear of flying      Herpes simplex virus infection      Rosacea      Vitamin D deficiency      Vitiligo        VITALS     BP Readings from Last 1 Encounters:   12/07/22 138/77     Pulse Readings from Last 1 Encounters:   12/07/22 94     Wt Readings from Last 1 Encounters:   12/07/22 82.2 kg (181 lb 3.2 oz)     Ht Readings from Last 1 Encounters:   12/07/22 1.699 m (5' 6.9\")     Estimated body mass index is 28.46 kg/m  as calculated from the following:    Height as " "of 12/7/22: 1.699 m (5' 6.9\").    Weight as of 12/7/22: 82.2 kg (181 lb 3.2 oz).    LABS & IMAGING                                                                                                                   Recent Labs   Lab Test 06/21/22  0807   HGB 13.0     Recent Labs   Lab Test 06/21/22  0807   *   POTASSIUM 4.1   CHLORIDE 103   CO2 19*   GLC 81   DOC 8.9   BUN 7.3   CR 0.81   GFRESTIMATED >90     Recent Labs   Lab Test 06/21/22  0807   CHOL 199   *   HDL 41*   TRIG 268*   A1C 5.6     Recent Labs   Lab Test 06/21/22  0807   TSH 3.23        ALLERGY & IMMUNIZATIONS     No Known Allergies    MEDICAL REVIEW OF SYSTEMS:   Ten system review was completed with pertinent positives noted     MENTAL STATUS EXAM:      General/Constitutional:  Appearance:   awake, alert, adequately groomed, appeared stated age and no apparent distress  Attitude:    cooperative   Eye Contact:  good  Musculoskeletal:  Psychomotor Behavior:  no evidence of tardive dyskinesia, dystonia, or tics from the head up  Psychiatric:  Speech:  clear, coherent, regular rate, rhythm, and volume,   No pressure speech noted.  Associations:  no loose associations  Thought Process:   logical, linear and goal oriented  Thought Content:    No evidence of suicidal ideation or homicidal ideation, no evidence of psychotic thought, no auditory hallucinations present and no visual hallucinations present  Mood:  improving but some increase in irritable, quick to react to minimal triggers that is pointed.  Affect:  full range/stable (normal variation of emotions during exam) and was congruent to speech content.  Insight:  good  Judgment:  intact, adequate for safety  Impulse Control:  intact  Neurological:  Oriented to:  person, place, time, and situation  Attention Span and Concentration:  Able to attend to the interview      Language: intact     Recent and Remote Memory:  Intact to interview. Not formally assessed. No amnesia.    Fund of " Knowledge: appropriate       SAFETY   Feels safe in home: YES     Suicidal ideation: Denies  History of suicide attempts:  No   Hx of impulsivity: No   Hope for the future: present    Hx of Command hallucinations or current psychosis: None endorsed    History of Self-injurious behaviors: denies   Family member  by suicide:  not discussed       SAFETY ASSESSMENT:   Based on all available evidence including the factors cited above, overall Risk for harm is low and is appropriate for outpatient level of care.   Recommended that patient call 911 or go to the local ED should there be a change in any of these risk factors      DSM 5 DIAGNOSIS:   Attention-Deficit/Hyperactivity Disorder  314.00 (F90.0) Predominantly inattentive presentation per formal psych testing  296.31 (F33.0) Major Depressive Disorder, Recurrent Episode, Mild _  300.09 (F41.8) Other Specified Anxiety Disorder         MEDICAL COMORBIDITY IMPACTING CLINICAL PICTURE:  None noted.       ASSESSMENT AND PLAN      Problem List as of 1/10/2023 Reviewed: 1/10/2023 12:58 PM by Venice Alejandra APRN CNP          Noted       Behavioral    Last System Assessment & Plan 1/10/2023 Virtual Visit Edited 1/10/2023  1:00 PM by Venice Alejandra APRN CNP     Tolerating the bupropion  mg with positive effect on increased motivation, attention and energy.  Monitoring to see if irritable, quick to react to minimal triggers is increased or if it is coincidental.     Follow-up six weeks          1. ADHD (attention deficit hyperactivity disorder), inattentive type 2022     Relevant Medications     buPROPion (WELLBUTRIN XL) 150 MG 24 hr tablet        CONSULTS/REFERRALS:   Continue therapy   Coordinate care with therapist as needed     MEDICAL:   None at this time  Coordinate care with PCP (Tracy Ryan) as needed  Follow up with primary care provider as planned or for acute medical concerns.     PSYCHOEDUCATION:  Medication side effects and  alternatives reviewed. Health promotion activities recommended and reviewed today. All questions addressed. Education and counseling completed regarding risks and benefits of medications and psychotherapy options.  Consent provided by patient/guardian  Call the psychiatric nurse line with medication questions or concerns at [phone].  [employer] II[employer] may be used to communicate with your provider, but this is not intended to be used for emergencies.  BEERS CRITERIA: The American Geriatrics Society (AGS) released its second updated and expanded Beers Criteria - lists of potentially inappropriate medications for older adults - and one of the most frequently cited reference tools in the field of geriatrics. The Society also unveiled a suite of new  resources - including a list of alternative therapies for potentially inappropriate medications and more detailed guidance on best practices for implementing AGS recommendations.  Discussed with client.   SocialProof.gov is information for patients.  It is run by the [employer] and it contains information about all disorders, diseases and all medications.       COMMUNITY RESOURCES:    CRISIS NUMBERS: Provided in AVS 12/14/2022  National Suicide Prevention Lifeline: 2-606-964-TALK (384-285-8508)  Partschannel/resources for a list of additional resources (SOS)            [facility] - [phone]   Urgent Care Adult Mental Cpikxu-[phone] mobile unit/ 24/7 crisis line  [facility] -[phone]   COPE 24/7 [place] Mobile Team -[phone] (adults)/ [phone] (child)  Poison Control Center - 1-524.611.6220    OR  go to nearest ER  Crisis Text Line for any crisis 24/7 send this-   To: 535282   [facility] ([facility]  [phone]  National Suicide Prevention Lifeline: 249.867.4245 (TTY: 317.815.9754). Call anytime for help.  (www.suicidepreventionlifeline.org)  National [place] on Mental  Illness (www.jenn.org): 753-380-8682 or 118-238-3685.   Mental Health Association (www.mentalhealth.org): 829.188.7921 or 417-800-5848.  [place] Crisis Text Line: Text [place] to 820295  Suicide LifeLine Chat: suicidepreventionManzamaline.org/chat      ADMINISTRATIVE BILLIN minutes spent interviewing patient, reviewing referral documents, obtaining and reviewing outside records, communication with other health specialists, and preparing this report on today's date    Video/Phone Start Time: 1250  Video/Phone End Time: 1:06 PM    Patient Status:  Patient will continue to be seen for ongoing consultation and stabilization.    Signed:   Venice Alejandra, MSN, APRN, FMHNP-[facility] ([facility])   Chart documentation done in part with Dragon Voice Recognition software.  Although reviewed after completion, some word and grammatical errors may remain.

## 2023-01-10 NOTE — ASSESSMENT & PLAN NOTE
Tolerating the bupropion  mg with positive effect on increased motivation, attention and energy.  Monitoring to see if irritable, quick to react to minimal triggers is increased or if it is coincidental.     Follow-up six weeks

## 2023-01-25 ENCOUNTER — VIRTUAL VISIT (OUTPATIENT)
Dept: PSYCHOLOGY | Facility: CLINIC | Age: 43
End: 2023-01-25
Payer: COMMERCIAL

## 2023-01-25 DIAGNOSIS — F41.8 OTHER SPECIFIED ANXIETY DISORDERS: ICD-10-CM

## 2023-01-25 DIAGNOSIS — F33.0 MILD EPISODE OF RECURRENT MAJOR DEPRESSIVE DISORDER (H): ICD-10-CM

## 2023-01-25 DIAGNOSIS — F90.2 ATTENTION DEFICIT HYPERACTIVITY DISORDER (ADHD), COMBINED TYPE: Primary | ICD-10-CM

## 2023-01-25 PROCEDURE — 90834 PSYTX W PT 45 MINUTES: CPT | Mod: 95

## 2023-01-25 NOTE — PROGRESS NOTES
[facility]                                     Progress Note    Patient Name: Vidhi Tovar  Date: 1/25/2023         Service Type: Individual      Session Start Time: 9:30 AM  Session End Time: 10:17 PM     Session Length: 38-52 min    Session #: 8    Attendees: Client attended alone    Service Modality:  Video Visit:      Provider verified identity through the following two step process.  Patient provided:  Patient is known previously to provider    Telemedicine Visit: The patient's condition can be safely assessed and treated via synchronous audio and visual telemedicine encounter.      Reason for Telemedicine Visit: Patient has requested telehealth visit    Originating Site (Patient Location): Patient's home    Distant Site (Provider Location): Provider Remote Setting- Home Office    Consent:  The patient/guardian has verbally consented to: the potential risks and benefits of telemedicine (video visit) versus in person care; bill my insurance or make self-payment for services provided; and responsibility for payment of non-covered services.     Patient would like the video invitation sent by:  My Chart    Mode of Communication:  Video Conference via [facility]    Distant Location (Provider):  Off-site    As the provider I attest to compliance with applicable laws and regulations related to telemedicine.    DATA  Interactive Complexity: No  Crisis: No        Progress Since Last Session (Related to Symptoms / Goals / Homework):   Symptoms: No change based on patient self-report. Patient endorses symptoms of little interest in doing things, feeling hopeless, fluctuating appetite,  low self-worth, difficulty concentrating, psychomotor agitation, feeling anxious, worrying about different things, restlessness, feeling afraid something awful might happen, and irritability.     Homework: Partially completed      Episode of Care Goals: Satisfactory progress - ACTION (Actively working towards change);  "Intervened by reinforcing change plan / affirming steps taken     Current / Ongoing Stressors and Concerns:    Patient reports she is concerned about her parents and them aging. Patient reports she is concerned about \"the world\". Patient reports she is concerned about her interpersonal relationships. Patient reports she is concerned about organization skills. Patient reports she is concerned about how her ADHD impacts social relationships.Patient reports concerns about rejection sensitivity. Patient reports she is concerned about her brother and his family. Patient reports she is concerned about the role she plays in her family dynamic.      Treatment Objective(s) Addressed in This Session:    Patient will learn about the diagnosis and symptoms of ADHD.     Patient will learn how ADHD can impact interpersonal relationships and social skills.    Client will learn about co-morbidities between ADHD and anxiety disorders.    Client will identify triggers of anxiety and process them in session.       Intervention:   Motivational Interviewing: supported patient in processing and exploring her vacation. Validated patient's emotions. Supported patient in exploring what it was like to travel with strangers versus friends. Reviewed rejection sensitivity and how ADHD can impact social interactions. Supported patient in exploring how she was able to show up authentically with strangers. Discussed patient's level of physical activity on her vacation. Discussed how physical activity can reduce symptoms of ADHD. Supported patient in exploring how physical activity can act as a coping skills for symptoms of ADHD.     Assessments completed prior to visit:  The following assessments were completed by patient for this visit:  PHQ9:   PHQ-9 SCORE 9/28/2022 10/31/2022 12/2/2022 1/3/2023 1/9/2023   PHQ-9 Total Score MyChart - - - - 6 (Mild depression)   PHQ-9 Total Score 6 15 3 6 6     GAD7:   DIONNE-7 SCORE 6/22/2022 6/22/2022 9/28/2022 " 10/31/2022 12/2/2022 1/3/2023   Total Score - 5 (mild anxiety) - - - -   Total Score 5 5 4 13 6 5     PROMIS 10-Global Health (only subscores and total score):   PROMIS-10 Scores Only 6/28/2022 7/19/2022 10/31/2022 12/14/2022   Global Mental Health Score 11 11 13 12   Global Physical Health Score 15 16 14 16   PROMIS TOTAL - SUBSCORES 26 27 27 28         ASSESSMENT: Current Emotional / Mental Status (status of significant symptoms):   Risk status (Self / Other harm or suicidal ideation)   Patient denies current fears or concerns for personal safety.   Patient denies current or recent suicidal ideation or behaviors.   Patient denies current or recent homicidal ideation or behaviors.   Patient denies current or recent self injurious behavior or ideation.   Patient denies other safety concerns.   Patient reports there has been no change in risk factors since their last session.     Patient reports there has been no change in protective factors since their last session.     Recommended that patient call 911 or go to the local ED should there be a change in any of these risk factors.     Appearance:   Appropriate    Eye Contact:   Good    Psychomotor Behavior: Normal    Attitude:   Cooperative    Orientation:   All   Speech    Rate / Production: Hyperverbal     Volume:  Normal    Mood:    Anxious  Depressed  Sad    Affect:    Worrisome    Thought Content:  Rumination    Thought Form:  Coherent    Insight:    Good      Medication Review:   No changes to current psychiatric medication(s)     Medication Compliance:   Yes     Changes in Health Issues:   None reported     Chemical Use Review:   Substance Use: Problem use continues with no change since last session, Not addressed in session        Tobacco Use: No current tobacco use.      Diagnosis:  1. Attention deficit hyperactivity disorder (ADHD), combined type    2. Mild episode of recurrent major depressive disorder (H)    3. Other specified anxiety disorders         Collateral Reports Completed:   Not Applicable    PLAN: (Patient Tasks / Therapist Tasks / Other)  Patient will journal about how physical activity is a coping skill for symptoms of ADHD. Patient will be mindful of what symptoms increase/decrease once she is moving less.   We will discuss next session.         TAMY York [facility] 1/25/2023    _____________________________________________________                                              Individual Treatment Plan    Patient's Name: Vidhi Tovar  YOB: 1980    Date of Creation: 11/9/2022  Date Treatment Plan Last Reviewed/Revised: 11/9/2022    DSM5 Diagnoses: Attention-Deficit/Hyperactivity Disorder  314.01 (F90.2) Combined presentation, 296.31 (F33.0) Major Depressive Disorder, Recurrent Episode, Mild _ or 300.09 (F41.8) Other Specified Anxiety Disorder   Psychosocial / Contextual Factors: Patient is self-employed. Patient is single. Patient is taking care of her elderly parents.   PROMIS (reviewed every 90 days):   The following assessments were completed by patient for this visit:  PROMIS 10-Global Health (only subscores and total score):   PROMIS-10 Scores Only 6/28/2022 7/19/2022 10/31/2022 12/14/2022   Global Mental Health Score 11 11 13 12   Global Physical Health Score 15 16 14 16   PROMIS TOTAL - SUBSCORES 26 27 27 28       Referral / Collaboration:  Referral to another professional/service is not indicated at this time..    Anticipated number of session for this episode of care: 9-12 sessions  Anticipation frequency of session: Weekly  Anticipated Duration of each session: 38-52 minutes  Treatment plan will be reviewed in 90 days or when goals have been changed.       MeasurableTreatment Goal(s) related to diagnosis / functional impairment(s)  Goal 1:Patient will work on stabilizing symptoms of ADHD.      Objective #A  Patient will learn about the diagnosis and symptoms of ADHD.   Status: New - Date: 11/9/2022      Intervention(s)  Therapist will teach about the diagnosis of ADHD.     Objective #B  Patient will learn 3 skills to improve organization.                        Status: New - Date: 11/9/2022     Intervention(s)  Therapist will teach organizational skills.      Objective #C   Patient will learn how ADHD can impact interpersonal relationships and social skills.   Status: New - Date: 11/9/2022     Intervention(s)  Therapist will teach about the symptoms of ADHD and how a neuro-divergent brain tries to build social connections.        Goal 2: Client will work on stabilizing anxiety symptoms as evidenced by DIONNE-7 scores (current is 13) and Self report.  Goal is to reduce by five points.      Objective #A Client will identify triggers of anxiety and process them in session.    Status: New - Date: 11/9/2022    Intervention(s)  Therapist will teach emotional recognition/identification by assigning homework to journal with written exercises.    Objective #B  Client will identify initial signs or symptoms of anxiety.    Status: New - Date: 11/9/2022    Intervention(s)  Therapist will teach emotional regulation skills, such as deep breathing and mindfulness skills.    Objective #C  Client will learn about co-morbidities between ADHD and anxiety disorders.   Status: New - Date: 11/9/2022    Intervention(s)  Therapist will provide educational materials on anxiety disorders and ADHD.          Patient has reviewed and agreed to the above plan.      TAMY York LICSW  November 9, 2022    Trent Ceballos  LICSW  1/27/2023

## 2023-01-27 ENCOUNTER — OFFICE VISIT (OUTPATIENT)
Dept: FAMILY MEDICINE | Facility: CLINIC | Age: 43
End: 2023-01-27
Payer: COMMERCIAL

## 2023-01-27 VITALS
HEIGHT: 67 IN | SYSTOLIC BLOOD PRESSURE: 126 MMHG | OXYGEN SATURATION: 97 % | DIASTOLIC BLOOD PRESSURE: 81 MMHG | HEART RATE: 69 BPM | TEMPERATURE: 98.7 F | BODY MASS INDEX: 28.41 KG/M2 | WEIGHT: 181 LBS

## 2023-01-27 DIAGNOSIS — H92.01 RIGHT EAR PAIN: Primary | ICD-10-CM

## 2023-01-27 NOTE — NURSING NOTE
"ROOM:3  GISELE CHAN    Preferred Name: Mindy     How did you hear about us?  Current Patient    42 year old  Chief Complaint   Patient presents with     Ear Problem     Possible ear infection, right ear, four days       Blood pressure 126/81, pulse 69, temperature 98.7  F (37.1  C), temperature source Oral, height 1.699 m (5' 6.9\"), weight 82.1 kg (181 lb), SpO2 97 %. Body mass index is 28.43 kg/m .  BP completed using cuff size:        Patient Active Problem List   Diagnosis     ADHD (attention deficit hyperactivity disorder), inattentive type     Depression, unspecified depression type       Wt Readings from Last 2 Encounters:   01/27/23 82.1 kg (181 lb)   12/07/22 82.2 kg (181 lb 3.2 oz)     BP Readings from Last 3 Encounters:   01/27/23 126/81   12/07/22 138/77   10/05/22 130/84       No Known Allergies    Current Outpatient Medications   Medication     ALPRAZolam (XANAX) 0.25 MG tablet     buPROPion (WELLBUTRIN XL) 150 MG 24 hr tablet     levonorgestrel (KYLEENA) 19.5 MG IUD     No current facility-administered medications for this visit.       Social History     Tobacco Use     Smoking status: Former     Smokeless tobacco: Never   Vaping Use     Vaping Use: Never used   Substance Use Topics     Alcohol use: Not Currently     Comment: former     Drug use: Yes     Types: Marijuana, Psilocybin     Comment: occasional use       Honoring Choices - Health Care Directive Guide offered to patient at time of visit.    Health Maintenance Due   Topic Date Due     ADVANCE CARE PLANNING  Never done     DEPRESSION ACTION PLAN  Never done     HEPATITIS B IMMUNIZATION (2 of 3 - Hep B Twinrix 3-dose series) 11/15/2014       Immunization History   Administered Date(s) Administered     COVID-19 Vaccine 12+ (Pfizer 2022) 04/08/2022     Influenza Vaccine >6 months (Alfuria,Fluzone) 10/05/2022     Influenza Vaccine, 6+MO IM (QUADRIVALENT W/PRESERVATIVES) 11/01/2019, 10/13/2020     TDAP Vaccine (Boostrix) 06/17/2019       No " results found for: PAP    Recent Labs   Lab Test 06/21/22  0807 05/19/21  0000   A1C 5.6  --    *  --    HDL 41*  --    TRIG 268* 196*   CR 0.81  --    GFRESTIMATED >90  --    POTASSIUM 4.1  --    TSH 3.23  --        PHQ-2 ( 1999 Pfizer) 12/14/2022 12/7/2022   Q1: Little interest or pleasure in doing things 1 1   Q2: Feeling down, depressed or hopeless 1 1   PHQ-2 Score 2 2   Q1: Little interest or pleasure in doing things Several days -   Q2: Feeling down, depressed or hopeless Several days -   PHQ-2 Score 2 -       PHQ-9 SCORE 10/31/2022 12/2/2022 1/3/2023 1/9/2023   PHQ-9 Total Score MyChart - - - 6 (Mild depression)   PHQ-9 Total Score 15 3 6 6       DIONNE-7 SCORE 10/31/2022 12/2/2022 1/3/2023   Total Score - - -   Total Score 13 6 5       No flowsheet data found.    Renetta Mena    January 27, 2023 10:01 AM

## 2023-01-27 NOTE — PROGRESS NOTES
"Assessment & Plan   Vidhi was seen today for ear problem.    Diagnoses and all orders for this visit:    Right ear pain  -No signs of otitis media or middle ear effusion. Mild erythema superior to TM. Likely due to recent URI. Symptoms have improved last 2 days.   Plan: PRN ibuprofen/tylenol for discomfort. Will likely resolve in the next 1-2 weeks    RTC PRN      Tracy Ryan, NP   ______________________________________    Subjective   Mindy is a 42 year old patient here today for Ear Problem (Possible ear infection, right ear, four days)    Recently returned from vacation  [place]  Recently had a cold with some congestion  Right ear was painful for 4 days, better the last 2 days  No fever or chills  No changes in hearing  No dizziness  Wants to make sure no infection    Do you need any refills on your Medications today? No    Medical, surgical, family and social histories reviewed and updated as indicated.   Medications and allergies reviewed and updated as indicated.       ROS  Review Of Systems  See HPI      General Physical Exam:  Vitals: /81   Pulse 69   Temp 98.7  F (37.1  C) (Oral)   Ht 1.699 m (5' 6.9\")   Wt 82.1 kg (181 lb)   SpO2 97%   BMI 28.43 kg/m    Physical Exam  Vitals and nursing note reviewed.   Constitutional:       General: She is not in acute distress.     Appearance: Normal appearance. She is not ill-appearing.   HENT:      Head: Normocephalic and atraumatic.      Right Ear: Ear canal and external ear normal. No middle ear effusion. There is no impacted cerumen. Tympanic membrane is erythematous (mild). Tympanic membrane is not injected, retracted or bulging.      Left Ear: Tympanic membrane, ear canal and external ear normal.   Eyes:      General: Lids are normal.      Conjunctiva/sclera: Conjunctivae normal.   Pulmonary:      Effort: Pulmonary effort is normal.   Neurological:      General: No focal deficit present.      Mental Status: She is alert and oriented " to person, place, and time.      Gait: Gait is intact.   Psychiatric:         Mood and Affect: Mood normal.         Behavior: Behavior normal.         Thought Content: Thought content normal.         Judgment: Judgment normal.

## 2023-02-01 ENCOUNTER — VIRTUAL VISIT (OUTPATIENT)
Dept: PSYCHOLOGY | Facility: CLINIC | Age: 43
End: 2023-02-01
Payer: COMMERCIAL

## 2023-02-01 DIAGNOSIS — F41.8 OTHER SPECIFIED ANXIETY DISORDERS: ICD-10-CM

## 2023-02-01 DIAGNOSIS — F33.0 MILD EPISODE OF RECURRENT MAJOR DEPRESSIVE DISORDER (H): ICD-10-CM

## 2023-02-01 DIAGNOSIS — F90.2 ATTENTION DEFICIT HYPERACTIVITY DISORDER (ADHD), COMBINED TYPE: Primary | ICD-10-CM

## 2023-02-01 PROCEDURE — 90834 PSYTX W PT 45 MINUTES: CPT | Mod: 95

## 2023-02-01 ASSESSMENT — ANXIETY QUESTIONNAIRES
GAD7 TOTAL SCORE: 0
1. FEELING NERVOUS, ANXIOUS, OR ON EDGE: NOT AT ALL
GAD7 TOTAL SCORE: 0
6. BECOMING EASILY ANNOYED OR IRRITABLE: NOT AT ALL
4. TROUBLE RELAXING: NOT AT ALL
7. FEELING AFRAID AS IF SOMETHING AWFUL MIGHT HAPPEN: NOT AT ALL
3. WORRYING TOO MUCH ABOUT DIFFERENT THINGS: NOT AT ALL
7. FEELING AFRAID AS IF SOMETHING AWFUL MIGHT HAPPEN: NOT AT ALL
2. NOT BEING ABLE TO STOP OR CONTROL WORRYING: NOT AT ALL
GAD7 TOTAL SCORE: 0
5. BEING SO RESTLESS THAT IT IS HARD TO SIT STILL: NOT AT ALL

## 2023-02-01 ASSESSMENT — COLUMBIA-SUICIDE SEVERITY RATING SCALE - C-SSRS
2. HAVE YOU ACTUALLY HAD ANY THOUGHTS OF KILLING YOURSELF?: NO
1. SINCE LAST CONTACT, HAVE YOU WISHED YOU WERE DEAD OR WISHED YOU COULD GO TO SLEEP AND NOT WAKE UP?: NO
ATTEMPT SINCE LAST CONTACT: NO
6. HAVE YOU EVER DONE ANYTHING, STARTED TO DO ANYTHING, OR PREPARED TO DO ANYTHING TO END YOUR LIFE?: NO
TOTAL  NUMBER OF ABORTED OR SELF INTERRUPTED ATTEMPTS SINCE LAST CONTACT: NO
TOTAL  NUMBER OF INTERRUPTED ATTEMPTS SINCE LAST CONTACT: NO
SUICIDE, SINCE LAST CONTACT: NO

## 2023-02-01 ASSESSMENT — PATIENT HEALTH QUESTIONNAIRE - PHQ9
SUM OF ALL RESPONSES TO PHQ QUESTIONS 1-9: 2
SUM OF ALL RESPONSES TO PHQ QUESTIONS 1-9: 2

## 2023-02-01 NOTE — PROGRESS NOTES
[facility]                                     Progress Note    Patient Name: Vidhi Tovar  Date: 2/1/2023         Service Type: Individual      Session Start Time: 9:30 AM  Session End Time: 10:16 PM     Session Length: 38-52 min    Session #: 9    Attendees: Client attended alone    Service Modality:  Video Visit:      Provider verified identity through the following two step process.  Patient provided:  Patient is known previously to provider    Telemedicine Visit: The patient's condition can be safely assessed and treated via synchronous audio and visual telemedicine encounter.      Reason for Telemedicine Visit: Patient has requested telehealth visit    Originating Site (Patient Location): Patient's home    Distant Site (Provider Location): Provider Remote Setting- Home Office    Consent:  The patient/guardian has verbally consented to: the potential risks and benefits of telemedicine (video visit) versus in person care; bill my insurance or make self-payment for services provided; and responsibility for payment of non-covered services.     Patient would like the video invitation sent by:  My Chart    Mode of Communication:  Video Conference [facility]    Distant Location (Provider):  Off-site    As the provider I attest to compliance with applicable laws and regulations related to telemedicine.    DATA  Interactive Complexity: No  Crisis: No        Progress Since Last Session (Related to Symptoms / Goals / Homework):   Symptoms: Improving based on PHQ-9 and DIONNE-7 scores and patient self-report. Patient endorses symptoms of feeling tired, and difficulty concentrating.     Homework: Partially completed      Episode of Care Goals: Satisfactory progress - ACTION (Actively working towards change); Intervened by reinforcing change plan / affirming steps taken     Current / Ongoing Stressors and Concerns:    Patient reports she is concerned about her parents and them aging. Patient reports  "she is concerned about \"the world\". Patient reports she is concerned about her interpersonal relationships. Patient reports she is concerned about organization skills. Patient reports she is concerned about how her ADHD impacts social relationships.Patient reports concerns about rejection sensitivity. Patient reports she is concerned about her brother and his family. Patient reports she is concerned about the role she plays in her family dynamic. Patient reports she is concerned about anger.      Treatment Objective(s) Addressed in This Session:    Patient will learn about the diagnosis and symptoms of ADHD.     Patient will learn how ADHD can impact interpersonal relationships and social skills.    Client will learn about co-morbidities between ADHD and anxiety disorders.    Client will identify triggers of anxiety and process them in session.       Intervention:   Motivational Interviewing: supported patient in processing and exploring her new frame of mind since returning from vacation. Validated patient's emotions. Supported patient in exploring what has changed since returning from vacation. Supported patient in exploring how understanding her symptoms of ADHD has added to this change. Supported patient in exploring anger. Discussed anger as a secondary emotion. Discussed the body's physical reaction to anger. Discussed doing the opposite of the body's natural reaction to anger to help reduce the emotion.    Reviewed treatment plan and goals with patient.     Assessments completed prior to visit:  The following assessments were completed by patient for this visit:  PHQ9:   PHQ-9 SCORE 9/28/2022 10/31/2022 12/2/2022 1/3/2023 1/9/2023 2/1/2023   PHQ-9 Total Score MyChart - - - - 6 (Mild depression) 2 (Minimal depression)   PHQ-9 Total Score 6 15 3 6 6 2     GAD7:   DIONNE-7 SCORE 6/22/2022 6/22/2022 9/28/2022 10/31/2022 12/2/2022 1/3/2023 2/1/2023   Total Score - 5 (mild anxiety) - - - - 0 (minimal anxiety)   Total " Score 5 5 4 13 6 5 0     PROMIS 10-Global Health (only subscores and total score):   PROMIS-10 Scores Only 6/28/2022 7/19/2022 10/31/2022 12/14/2022 2/1/2023   Global Mental Health Score 11 11 13 12 15   Global Physical Health Score 15 16 14 16 16   PROMIS TOTAL - SUBSCORES 26 27 27 28 31         ASSESSMENT: Current Emotional / Mental Status (status of significant symptoms):   Risk status (Self / Other harm or suicidal ideation)   Patient denies current fears or concerns for personal safety.   Patient denies current or recent suicidal ideation or behaviors.   Patient denies current or recent homicidal ideation or behaviors.   Patient denies current or recent self injurious behavior or ideation.   Patient denies other safety concerns.   Patient reports there has been no change in risk factors since their last session.     Patient reports there has been no change in protective factors since their last session.     Recommended that patient call 911 or go to the local ED should there be a change in any of these risk factors.     Appearance:   Appropriate    Eye Contact:   Good    Psychomotor Behavior: Normal    Attitude:   Cooperative    Orientation:   All   Speech    Rate / Production: Hyperverbal     Volume:  Normal    Mood:    Anxious  Depressed  Sad    Affect:    Worrisome    Thought Content:  Rumination    Thought Form:  Coherent    Insight:    Good      Medication Review:   No current psychiatric medications prescribed     Medication Compliance:   Yes     Changes in Health Issues:   None reported     Chemical Use Review:   Substance Use: Problem use continues with no change since last session, Not addressed in session        Tobacco Use: No current tobacco use.      Diagnosis:  1. Attention deficit hyperactivity disorder (ADHD), combined type    2. Mild episode of recurrent major depressive disorder (H)    3. Other specified anxiety disorders        Collateral Reports Completed:   Not Applicable    PLAN: (Patient  Tasks / Therapist Tasks / Other)  Patient will practice doing to opposite of her physical reaction to anger (ie. Relaxing muscles for muscle tension and changing body temperature).   We will discuss next session.         TAMY York [facility] 2/1/2023    _____________________________________________________                                              Individual Treatment Plan    Patient's Name: Vidhi Tovar  YOB: 1980    Date of Creation: 11/9/2022  Date Treatment Plan Last Reviewed/Revised: 2/1/2023    DSM5 Diagnoses: Attention-Deficit/Hyperactivity Disorder  314.01 (F90.2) Combined presentation, 296.31 (F33.0) Major Depressive Disorder, Recurrent Episode, Mild _ or 300.09 (F41.8) Other Specified Anxiety Disorder   Psychosocial / Contextual Factors: Patient is self-employed. Patient is single. Patient is taking care of her elderly parents.   PROMIS (reviewed every 90 days):   The following assessments were completed by patient for this visit:  PROMIS 10-Global Health (only subscores and total score):   PROMIS-10 Scores Only 6/28/2022 7/19/2022 10/31/2022 12/14/2022   Global Mental Health Score 11 11 13 12   Global Physical Health Score 15 16 14 16   PROMIS TOTAL - SUBSCORES 26 27 27 28       Referral / Collaboration:  Referral to another professional/service is not indicated at this time.    Anticipated number of session for this episode of care: 6-9 sessions  Anticipation frequency of session: Every other week  Anticipated Duration of each session: 38-52 minutes  Treatment plan will be reviewed in 90 days or when goals have been changed.       MeasurableTreatment Goal(s) related to diagnosis / functional impairment(s)  Goal 1:Patient will work on stabilizing symptoms of ADHD.      Objective #A  Patient will learn about the diagnosis and symptoms of ADHD.   Status: Continued - Date(s): 2/1/2023     Intervention(s)  Therapist will teach about the diagnosis of ADHD.     Objective #B  Patient  will learn 3 skills to improve organization.                        Status: Continued - Date(s): 2/1/2023     Intervention(s)  Therapist will teach organizational skills.      Objective #C   Patient will learn how ADHD can impact interpersonal relationships and social skills.   Status: Continued - Date(s): 2/1/2023     Intervention(s)  Therapist will teach about the symptoms of ADHD and how a neuro-divergent brain tries to build social connections.        Goal 2: Client will work on stabilizing anxiety symptoms as evidenced by DIONNE-7 scores (current is 0) and Self report.  Goal is to maintain a score under 5.      Objective #A Client will identify triggers of anxiety and process them in session.    Status: Continued - Date(s): 2/1/2023    Intervention(s)  Therapist will teach emotional recognition/identification by assigning homework to journal with written exercises.    Objective #B  Client will identify initial signs or symptoms of anxiety.    Status: Continued - Date(s): 2/1/2023    Intervention(s)  Therapist will teach emotional regulation skills, such as deep breathing and mindfulness skills.    Objective #C  Client will learn about co-morbidities between ADHD and anxiety disorders.   Status: Continued - Date(s): 2/1/2023    Intervention(s)  Therapist will provide educational materials on anxiety disorders and ADHD.          Patient has reviewed and agreed to the above plan.      TAMY York LICSW  November 9, 2022 Reviewed 2/1/2023    Trent Ceballos  [facility]  2/3/2023    Answers for HPI/ROS submitted by the patient on 2/1/2023  PHQ9 TOTAL SCORE: 2  DIONNE 7 TOTAL SCORE: 0

## 2023-02-14 ENCOUNTER — MYC REFILL (OUTPATIENT)
Dept: PSYCHIATRY | Facility: CLINIC | Age: 43
End: 2023-02-14
Payer: COMMERCIAL

## 2023-02-14 DIAGNOSIS — F90.0 ADHD (ATTENTION DEFICIT HYPERACTIVITY DISORDER), INATTENTIVE TYPE: ICD-10-CM

## 2023-02-14 DIAGNOSIS — F41.9 ANXIETY: ICD-10-CM

## 2023-02-14 RX ORDER — BUPROPION HYDROCHLORIDE 150 MG/1
150 TABLET ORAL EVERY MORNING
Qty: 30 TABLET | Refills: 0 | Status: CANCELLED | OUTPATIENT
Start: 2023-02-14

## 2023-02-14 RX ORDER — ALPRAZOLAM 0.25 MG
0.25 TABLET ORAL 3 TIMES DAILY PRN
Qty: 10 TABLET | Refills: 0 | Status: CANCELLED | OUTPATIENT
Start: 2023-02-14

## 2023-02-14 NOTE — TELEPHONE ENCOUNTER
Date of Last Office Visit: 1/10/23  Date of Next Office Visit: 2/24/23  No shows since last visit: 0  Cancellations since last visit: 0    Medication requested: buPROPion (WELLBUTRIN XL) 150 MG 24 hr tablet  Date last ordered: 1/10/23 Qty: 30 Refills: 0    Medication requested: ALPRAZolam (XANAX) 0.25 MG tablet  Date last ordered: 12/14/22 Qty: 10 Refills: 0     Review of [place] ?: Not a delegate for this provider    Lapse in medication adherence greater than 5 days?: No  If yes, call patient and gather details: NA  Medication refill request verified as identical to current order?: :Yes  Result of Last DAM, VPA, Li+ Level, CBC, or Carbamazepine Level (at or since last visit): N/A    Last visit treatment plan:     TREATMENT PLAN:     MEDICATIONS:   - continue bupropion  mg        CONSULTS/REFERRALS:   Continue therapy      LABS/PROCEDURES: none at present   Please call your [place] clinic and ask for a lab only appointment at your earliest convenience.  If your results are reassuring or normal they will be mailed to you or sent through [employer] within 7 days. If the lab tests need quick action we will call you with the results. The phone number we will call with results is # [phone].        FOLLOW UP: Schedule an appointment with me in six weeks  or sooner as needed.  The intake team should be calling you to schedule.  If you dont hear from them, or they were unable to reach you, please call [phone] to schedule.  Follow up with primary care provider as planned or for acute medical concerns.  Call the psychiatric nurse line with medication questions or concerns at [phone].    []Medication refilled per  Medication Refill in Ambulatory Care  policy.  [x]Medication unable to be refilled by RN due to criteria not met as indicated below:    []Eligibility - not seen in the last year   []Supervision - no future appointment   []Compliance - no shows, cancellations or lapse in therapy   []Verification -  order discrepancy   [x]Controlled medication   [x]Medication not included in policy   []90-day supply request   []Other

## 2023-02-15 ENCOUNTER — VIRTUAL VISIT (OUTPATIENT)
Dept: PSYCHOLOGY | Facility: CLINIC | Age: 43
End: 2023-02-15
Payer: COMMERCIAL

## 2023-02-15 DIAGNOSIS — F41.8 OTHER SPECIFIED ANXIETY DISORDERS: ICD-10-CM

## 2023-02-15 DIAGNOSIS — F90.2 ATTENTION DEFICIT HYPERACTIVITY DISORDER (ADHD), COMBINED TYPE: Primary | ICD-10-CM

## 2023-02-15 DIAGNOSIS — F33.0 MILD EPISODE OF RECURRENT MAJOR DEPRESSIVE DISORDER (H): ICD-10-CM

## 2023-02-15 PROCEDURE — 90834 PSYTX W PT 45 MINUTES: CPT | Mod: VID

## 2023-02-15 ASSESSMENT — PATIENT HEALTH QUESTIONNAIRE - PHQ9
SUM OF ALL RESPONSES TO PHQ QUESTIONS 1-9: 1
SUM OF ALL RESPONSES TO PHQ QUESTIONS 1-9: 1
10. IF YOU CHECKED OFF ANY PROBLEMS, HOW DIFFICULT HAVE THESE PROBLEMS MADE IT FOR YOU TO DO YOUR WORK, TAKE CARE OF THINGS AT HOME, OR GET ALONG WITH OTHER PEOPLE: NOT DIFFICULT AT ALL

## 2023-02-15 ASSESSMENT — ANXIETY QUESTIONNAIRES
7. FEELING AFRAID AS IF SOMETHING AWFUL MIGHT HAPPEN: NOT AT ALL
4. TROUBLE RELAXING: NOT AT ALL
GAD7 TOTAL SCORE: 2
2. NOT BEING ABLE TO STOP OR CONTROL WORRYING: NOT AT ALL
6. BECOMING EASILY ANNOYED OR IRRITABLE: SEVERAL DAYS
1. FEELING NERVOUS, ANXIOUS, OR ON EDGE: NOT AT ALL
IF YOU CHECKED OFF ANY PROBLEMS ON THIS QUESTIONNAIRE, HOW DIFFICULT HAVE THESE PROBLEMS MADE IT FOR YOU TO DO YOUR WORK, TAKE CARE OF THINGS AT HOME, OR GET ALONG WITH OTHER PEOPLE: SOMEWHAT DIFFICULT
GAD7 TOTAL SCORE: 2
5. BEING SO RESTLESS THAT IT IS HARD TO SIT STILL: NOT AT ALL
3. WORRYING TOO MUCH ABOUT DIFFERENT THINGS: SEVERAL DAYS

## 2023-02-15 NOTE — PROGRESS NOTES
[facility]                                     Progress Note    Patient Name: Vidhi Tovar  Date: 2/15/2023         Service Type: Individual      Session Start Time: 8:31 AM  Session End Time: 9:17 PM     Session Length: 38-52 min    Session #:  10    Attendees: Client attended alone    Service Modality:  Video Visit:      Provider verified identity through the following two step process.  Patient provided:  Patient is known previously to provider    Telemedicine Visit: The patient's condition can be safely assessed and treated via synchronous audio and visual telemedicine encounter.      Reason for Telemedicine Visit: Patient has requested telehealth visit    Originating Site (Patient Location): Patient's home    Distant Site (Provider Location): Provider Remote Setting- Home Office    Consent:  The patient/guardian has verbally consented to: the potential risks and benefits of telemedicine (video visit) versus in person care; bill my insurance or make self-payment for services provided; and responsibility for payment of non-covered services.     Patient would like the video invitation sent by:  My Chart    Mode of Communication:  Video Conference [facility]    Distant Location (Provider):  Off-site    As the provider I attest to compliance with applicable laws and regulations related to telemedicine.    DATA  Interactive Complexity: No  Crisis: No        Progress Since Last Session (Related to Symptoms / Goals / Homework):   Symptoms: Improving based on PHQ-9 and DIONNE-7 scores and patient self-report. Patient endorses symptoms of sleep disturbance,worrying about different things, and irritability.     Homework: Partially completed      Episode of Care Goals: Satisfactory progress - ACTION (Actively working towards change); Intervened by reinforcing change plan / affirming steps taken     Current / Ongoing Stressors and Concerns:    Patient reports she is concerned about her parents and  "them aging. Patient reports she is concerned about \"the world\". Patient reports she is concerned about her interpersonal relationships. Patient reports she is concerned about organization skills. Patient reports she is concerned about how her ADHD impacts social relationships.Patient reports concerns about rejection sensitivity. Patient reports she is concerned about her brother and his family. Patient reports she is concerned about the role she plays in her family dynamic. Patient reports she is concerned about anger.      Treatment Objective(s) Addressed in This Session:    Patient will learn about the diagnosis and symptoms of ADHD.     Patient will learn how ADHD can impact interpersonal relationships and social skills.    Client will learn about co-morbidities between ADHD and anxiety disorders.    Client will identify triggers of anxiety and process them in session.       Intervention:   Motivational Interviewing: supported patient in processing and exploring decreased motivation levels. Validated patient's concerns. Supported patient in exploring what has worked in the past. Discussed using external motivators to help increase internal motivation. Discussed pairing something she is unmotivated to do with something she is motivated to do. Discussed reducing symptoms of anxiety as a motivator. Discussed how forgetfulness can impact motivation levels.     Assessments completed prior to visit:  The following assessments were completed by patient for this visit:  PHQ9:   PHQ-9 SCORE 9/28/2022 10/31/2022 12/2/2022 1/3/2023 1/9/2023 2/1/2023 2/15/2023   PHQ-9 Total Score MyChart - - - - 6 (Mild depression) 2 (Minimal depression) 1 (Minimal depression)   PHQ-9 Total Score 6 15 3 6 6 2 1     GAD7:   DIONNE-7 SCORE 6/22/2022 6/22/2022 9/28/2022 10/31/2022 12/2/2022 1/3/2023 2/1/2023   Total Score - 5 (mild anxiety) - - - - 0 (minimal anxiety)   Total Score 5 5 4 13 6 5 0     PROMIS 10-Global Health (only subscores and " total score):   PROMIS-10 Scores Only 6/28/2022 7/19/2022 10/31/2022 12/14/2022 2/1/2023   Global Mental Health Score 11 11 13 12 15   Global Physical Health Score 15 16 14 16 16   PROMIS TOTAL - SUBSCORES 26 27 27 28 31         ASSESSMENT: Current Emotional / Mental Status (status of significant symptoms):   Risk status (Self / Other harm or suicidal ideation)   Patient denies current fears or concerns for personal safety.   Patient denies current or recent suicidal ideation or behaviors.   Patient denies current or recent homicidal ideation or behaviors.   Patient denies current or recent self injurious behavior or ideation.   Patient denies other safety concerns.   Patient reports there has been no change in risk factors since their last session.     Patient reports there has been no change in protective factors since their last session.     Recommended that patient call 911 or go to the local ED should there be a change in any of these risk factors.     Appearance:   Appropriate    Eye Contact:   Good    Psychomotor Behavior: Normal    Attitude:   Cooperative    Orientation:   All   Speech    Rate / Production: Hyperverbal     Volume:  Normal    Mood:    Anxious  Depressed    Affect:    Worrisome    Thought Content:  Rumination    Thought Form:  Coherent    Insight:    Good      Medication Review:   No current psychiatric medications prescribed     Medication Compliance:   Yes     Changes in Health Issues:   None reported     Chemical Use Review:   Substance Use: Problem use continues with no change since last session, Not addressed in session        Tobacco Use: No current tobacco use.      Diagnosis:  1. Attention deficit hyperactivity disorder (ADHD), combined type    2. Mild episode of recurrent major depressive disorder (H)    3. Other specified anxiety disorders        Collateral Reports Completed:   Not Applicable    PLAN: (Patient Tasks / Therapist Tasks / Other)  Patient will practice using external  motivators to help increase motivation.    We will discuss next session.         Chaya Harmon, TAMY LICSW 2/15/2023  _____________________________________________________                                              Individual Treatment Plan    Patient's Name: Vidhi Tovar  YOB: 1980    Date of Creation: 11/9/2022  Date Treatment Plan Last Reviewed/Revised: 2/1/2023    DSM5 Diagnoses: Attention-Deficit/Hyperactivity Disorder  314.01 (F90.2) Combined presentation, 296.31 (F33.0) Major Depressive Disorder, Recurrent Episode, Mild _ or 300.09 (F41.8) Other Specified Anxiety Disorder   Psychosocial / Contextual Factors: Patient is self-employed. Patient is single. Patient is taking care of her elderly parents.   PROMIS (reviewed every 90 days):   The following assessments were completed by patient for this visit:  PROMIS 10-Global Health (only subscores and total score):   PROMIS-10 Scores Only 6/28/2022 7/19/2022 10/31/2022 12/14/2022 2/1/2023   Global Mental Health Score 11 11 13 12 15   Global Physical Health Score 15 16 14 16 16   PROMIS TOTAL - SUBSCORES 26 27 27 28 31       Referral / Collaboration:  Referral to another professional/service is not indicated at this time.    Anticipated number of session for this episode of care: 6-9 sessions  Anticipation frequency of session: Every other week  Anticipated Duration of each session: 38-52 minutes  Treatment plan will be reviewed in 90 days or when goals have been changed.       MeasurableTreatment Goal(s) related to diagnosis / functional impairment(s)  Goal 1:Patient will work on stabilizing symptoms of ADHD.      Objective #A  Patient will learn about the diagnosis and symptoms of ADHD.   Status: Continued - Date(s): 2/1/2023     Intervention(s)  Therapist will teach about the diagnosis of ADHD.     Objective #B  Patient will learn 3 skills to improve organization.                        Status: Continued - Date(s): 2/1/2023      Intervention(s)  Therapist will teach organizational skills.      Objective #C   Patient will learn how ADHD can impact interpersonal relationships and social skills.   Status: Continued - Date(s): 2/1/2023     Intervention(s)  Therapist will teach about the symptoms of ADHD and how a neuro-divergent brain tries to build social connections.        Goal 2: Client will work on stabilizing anxiety symptoms as evidenced by DIONNE-7 scores (current is 0) and Self report.  Goal is to maintain a score under 5.      Objective #A Client will identify triggers of anxiety and process them in session.    Status: Continued - Date(s): 2/1/2023    Intervention(s)  Therapist will teach emotional recognition/identification by assigning homework to journal with written exercises.    Objective #B  Client will identify initial signs or symptoms of anxiety.    Status: Continued - Date(s): 2/1/2023    Intervention(s)  Therapist will teach emotional regulation skills, such as deep breathing and mindfulness skills.    Objective #C  Client will learn about co-morbidities between ADHD and anxiety disorders.   Status: Continued - Date(s): 2/1/2023    Intervention(s)  Therapist will provide educational materials on anxiety disorders and ADHD.          Patient has reviewed and agreed to the above plan.      TAMY York LICSW  November 9, 2022 Reviewed 2/1/2023    Trent Ceballos  LICSW 2/17/2023    Answers for HPI/ROS submitted by the patient on 2/1/2023  PHQ9 TOTAL SCORE: 2  DIONNE 7 TOTAL SCORE: 0

## 2023-03-22 ENCOUNTER — VIRTUAL VISIT (OUTPATIENT)
Dept: PSYCHOLOGY | Facility: CLINIC | Age: 43
End: 2023-03-22
Payer: COMMERCIAL

## 2023-03-22 DIAGNOSIS — F41.8 OTHER SPECIFIED ANXIETY DISORDERS: ICD-10-CM

## 2023-03-22 DIAGNOSIS — F90.2 ATTENTION DEFICIT HYPERACTIVITY DISORDER (ADHD), COMBINED TYPE: Primary | ICD-10-CM

## 2023-03-22 DIAGNOSIS — F33.0 MILD EPISODE OF RECURRENT MAJOR DEPRESSIVE DISORDER (H): ICD-10-CM

## 2023-03-22 PROCEDURE — 90834 PSYTX W PT 45 MINUTES: CPT | Mod: VID

## 2023-03-22 ASSESSMENT — ANXIETY QUESTIONNAIRES
IF YOU CHECKED OFF ANY PROBLEMS ON THIS QUESTIONNAIRE, HOW DIFFICULT HAVE THESE PROBLEMS MADE IT FOR YOU TO DO YOUR WORK, TAKE CARE OF THINGS AT HOME, OR GET ALONG WITH OTHER PEOPLE: SOMEWHAT DIFFICULT
GAD7 TOTAL SCORE: 6
7. FEELING AFRAID AS IF SOMETHING AWFUL MIGHT HAPPEN: NOT AT ALL
1. FEELING NERVOUS, ANXIOUS, OR ON EDGE: MORE THAN HALF THE DAYS
5. BEING SO RESTLESS THAT IT IS HARD TO SIT STILL: NOT AT ALL
4. TROUBLE RELAXING: SEVERAL DAYS
GAD7 TOTAL SCORE: 6
6. BECOMING EASILY ANNOYED OR IRRITABLE: MORE THAN HALF THE DAYS
2. NOT BEING ABLE TO STOP OR CONTROL WORRYING: NOT AT ALL
3. WORRYING TOO MUCH ABOUT DIFFERENT THINGS: SEVERAL DAYS

## 2023-03-22 NOTE — PROGRESS NOTES
"[facility]                                     Progress Note    Patient Name: Vidhi Tovar  Date: 3/22/2023         Service Type: Individual      Session Start Time: 10:30 AM  Session End Time: 11:15 PM     Session Length: 38-52 min    Session #:  11    Attendees: Client attended alone    Service Modality:  Video Visit:      Provider verified identity through the following two step process.  Patient provided:  Patient is known previously to provider    Telemedicine Visit: The patient's condition can be safely assessed and treated via synchronous audio and visual telemedicine encounter.      Reason for Telemedicine Visit: Patient has requested telehealth visit    Originating Site (Patient Location): Patient's home    Distant Site (Provider Location): Provider Remote Setting- Home Office    Consent:  The patient/guardian has verbally consented to: the potential risks and benefits of telemedicine (video visit) versus in person care; bill my insurance or make self-payment for services provided; and responsibility for payment of non-covered services.     Patient would like the video invitation sent by:  My Chart    Mode of Communication:  Video Conference via [employer]    Distant Location (Provider):  Off-site    As the provider I attest to compliance with applicable laws and regulations related to telemedicine.    DATA  Interactive Complexity: No  Crisis: No        Progress Since Last Session (Related to Symptoms / Goals / Homework):   Symptoms: Worsening based on PHQ-9 and DIONNE-7 scores and patient self-report.     Homework: Partially completed      Episode of Care Goals: Satisfactory progress - ACTION (Actively working towards change); Intervened by reinforcing change plan / affirming steps taken     Current / Ongoing Stressors and Concerns:    Patient reports she is concerned about her parents and them aging. Patient reports she is concerned about \"the world\". Patient reports she is concerned " about her interpersonal relationships. Patient reports she is concerned about organization skills. Patient reports she is concerned about how her ADHD impacts social relationships.Patient reports concerns about rejection sensitivity. Patient reports she is concerned about her brother and his family. Patient reports she is concerned about the role she plays in her family dynamic. Patient reports she is concerned about anger.      Treatment Objective(s) Addressed in This Session:    Patient will learn about the diagnosis and symptoms of ADHD.     Patient will learn how ADHD can impact interpersonal relationships and social skills.    Client will learn about co-morbidities between ADHD and anxiety disorders.    Client will identify triggers of anxiety and process them in session.       Intervention:   DBT: taught the TIPP skill. Discussed how temperature changing is doing the opposite of what the body naturally wants to do when irritated or angry.   Motivational Interviewing: supported patient in processing and exploring the last month. Validated patient's emotions. Supported patient in exploring increase irritability. Discussed the transition from alone time to traveling with others. Supported patient in exploring the benefits of alone time for her. Discussed over versus under stimulation. Discussed using the TIPP skill to change temperature when feeling irritable or angry.     Assessments completed prior to visit:  The following assessments were completed by patient for this visit:  PHQ9:   PHQ-9 SCORE 10/31/2022 12/2/2022 1/3/2023 1/9/2023 2/1/2023 2/15/2023 3/22/2023   PHQ-9 Total Score MyChart - - - 6 (Mild depression) 2 (Minimal depression) 1 (Minimal depression) 6 (Mild depression)   PHQ-9 Total Score 15 3 6 6 2 1 6     GAD7:   DIONNE-7 SCORE 9/28/2022 10/31/2022 12/2/2022 1/3/2023 2/1/2023 2/15/2023 3/22/2023   Total Score - - - - 0 (minimal anxiety) - -   Total Score 4 13 6 5 0 2 6     PROMIS 10-Global Health  (only subscores and total score):   PROMIS-10 Scores Only 6/28/2022 7/19/2022 10/31/2022 12/14/2022 2/1/2023   Global Mental Health Score 11 11 13 12 15   Global Physical Health Score 15 16 14 16 16   PROMIS TOTAL - SUBSCORES 26 27 27 28 31         ASSESSMENT: Current Emotional / Mental Status (status of significant symptoms):   Risk status (Self / Other harm or suicidal ideation)   Patient denies current fears or concerns for personal safety.   Patient denies current or recent suicidal ideation or behaviors.   Patient denies current or recent homicidal ideation or behaviors.   Patient denies current or recent self injurious behavior or ideation.   Patient denies other safety concerns.   Patient reports there has been no change in risk factors since their last session.     Patient reports there has been no change in protective factors since their last session.     Recommended that patient call 911 or go to the local ED should there be a change in any of these risk factors.     Appearance:   Appropriate    Eye Contact:   Good    Psychomotor Behavior: Normal    Attitude:   Cooperative    Orientation:   All   Speech    Rate / Production: Hyperverbal     Volume:  Normal    Mood:    Anxious  Depressed    Affect:    Worrisome    Thought Content:  Rumination    Thought Form:  Coherent    Insight:    Good      Medication Review:   No current psychiatric medications prescribed     Medication Compliance:   Yes     Changes in Health Issues:   None reported     Chemical Use Review:   Substance Use: Problem use continues with no change since last session, Not addressed in session        Tobacco Use: No current tobacco use.      Diagnosis:  1. Attention deficit hyperactivity disorder (ADHD), combined type    2. Mild episode of recurrent major depressive disorder (H)    3. Other specified anxiety disorders        Collateral Reports Completed:   Not Applicable    PLAN: (Patient Tasks / Therapist Tasks / Other)  Patient will  practice using the TIPP skill to reduce irritability symptoms.  We will discuss next session.         Chaya Harmon, [facility]  3/22/2023  _____________________________________________________                                              Individual Treatment Plan    Patient's Name: Vidhi Tovar  YOB: 1980    Date of Creation: 11/9/2022  Date Treatment Plan Last Reviewed/Revised: 2/1/2023    DSM5 Diagnoses: Attention-Deficit/Hyperactivity Disorder  314.01 (F90.2) Combined presentation, 296.31 (F33.0) Major Depressive Disorder, Recurrent Episode, Mild _ or 300.09 (F41.8) Other Specified Anxiety Disorder   Psychosocial / Contextual Factors: Patient is self-employed. Patient is single. Patient is taking care of her elderly parents.   PROMIS (reviewed every 90 days):   The following assessments were completed by patient for this visit:  PROMIS 10-Global Health (only subscores and total score):   PROMIS-10 Scores Only 6/28/2022 7/19/2022 10/31/2022 12/14/2022 2/1/2023   Global Mental Health Score 11 11 13 12 15   Global Physical Health Score 15 16 14 16 16   PROMIS TOTAL - SUBSCORES 26 27 27 28 31       Referral / Collaboration:  Referral to another professional/service is not indicated at this time.    Anticipated number of session for this episode of care: 6-9 sessions  Anticipation frequency of session: Every other week  Anticipated Duration of each session: 38-52 minutes  Treatment plan will be reviewed in 90 days or when goals have been changed.       MeasurableTreatment Goal(s) related to diagnosis / functional impairment(s)  Goal 1:Patient will work on stabilizing symptoms of ADHD.      Objective #A  Patient will learn about the diagnosis and symptoms of ADHD.   Status: Continued - Date(s): 2/1/2023     Intervention(s)  Therapist will teach about the diagnosis of ADHD.     Objective #B  Patient will learn 3 skills to improve organization.                        Status: Continued - Date(s):  2/1/2023     Intervention(s)  Therapist will teach organizational skills.      Objective #C   Patient will learn how ADHD can impact interpersonal relationships and social skills.   Status: Continued - Date(s): 2/1/2023     Intervention(s)  Therapist will teach about the symptoms of ADHD and how a neuro-divergent brain tries to build social connections.        Goal 2: Client will work on stabilizing anxiety symptoms as evidenced by DIONNE-7 scores (current is 0) and Self report.  Goal is to maintain a score under 5.      Objective #A Client will identify triggers of anxiety and process them in session.    Status: Continued - Date(s): 2/1/2023    Intervention(s)  Therapist will teach emotional recognition/identification by assigning homework to journal with written exercises.    Objective #B  Client will identify initial signs or symptoms of anxiety.    Status: Continued - Date(s): 2/1/2023    Intervention(s)  Therapist will teach emotional regulation skills, such as deep breathing and mindfulness skills.    Objective #C  Client will learn about co-morbidities between ADHD and anxiety disorders.   Status: Continued - Date(s): 2/1/2023    Intervention(s)  Therapist will provide educational materials on anxiety disorders and ADHD.          Patient has reviewed and agreed to the above plan.      Chaya Harmon, [facility] CenterSW   November 9, 2022 Reviewed 2/1/2023    Trent Ceballos  [facility] 3/24/2023

## 2023-03-29 ENCOUNTER — VIRTUAL VISIT (OUTPATIENT)
Dept: PSYCHOLOGY | Facility: CLINIC | Age: 43
End: 2023-03-29
Payer: COMMERCIAL

## 2023-03-29 DIAGNOSIS — F33.0 MILD EPISODE OF RECURRENT MAJOR DEPRESSIVE DISORDER (H): ICD-10-CM

## 2023-03-29 DIAGNOSIS — F41.8 OTHER SPECIFIED ANXIETY DISORDERS: ICD-10-CM

## 2023-03-29 DIAGNOSIS — F90.2 ATTENTION DEFICIT HYPERACTIVITY DISORDER (ADHD), COMBINED TYPE: Primary | ICD-10-CM

## 2023-03-29 PROCEDURE — 90834 PSYTX W PT 45 MINUTES: CPT | Mod: VID

## 2023-03-29 ASSESSMENT — PATIENT HEALTH QUESTIONNAIRE - PHQ9
SUM OF ALL RESPONSES TO PHQ QUESTIONS 1-9: 4
10. IF YOU CHECKED OFF ANY PROBLEMS, HOW DIFFICULT HAVE THESE PROBLEMS MADE IT FOR YOU TO DO YOUR WORK, TAKE CARE OF THINGS AT HOME, OR GET ALONG WITH OTHER PEOPLE: SOMEWHAT DIFFICULT
SUM OF ALL RESPONSES TO PHQ QUESTIONS 1-9: 4

## 2023-03-29 NOTE — PROGRESS NOTES
"[facility]ing                                     Progress Note    Patient Name: Vidhi Tovar  Date: 3/29/2023         Service Type: Individual      Session Start Time: 10:31 AM  Session End Time: 11:16 PM     Session Length: 38-52 min    Session #:  12    Attendees: Client attended alone    Service Modality:  Video Visit:      Provider verified identity through the following two step process.  Patient provided:  Patient is known previously to provider    Telemedicine Visit: The patient's condition can be safely assessed and treated via synchronous audio and visual telemedicine encounter.      Reason for Telemedicine Visit: Patient has requested telehealth visit    Originating Site (Patient Location): Patient's home    Distant Site (Provider Location): Provider Remote Setting- Home Office    Consent:  The patient/guardian has verbally consented to: the potential risks and benefits of telemedicine (video visit) versus in person care; bill my insurance or make self-payment for services provided; and responsibility for payment of non-covered services.     Patient would like the video invitation sent by:  My Chart    Mode of Communication:  Video Conference via [facility]    Distant Location (Provider):  Off-site    As the provider I attest to compliance with applicable laws and regulations related to telemedicine.    DATA  Interactive Complexity: No  Crisis: No        Progress Since Last Session (Related to Symptoms / Goals / Homework):   Symptoms: No change based on patient self-report.     Homework: Partially completed      Episode of Care Goals: Satisfactory progress - ACTION (Actively working towards change); Intervened by reinforcing change plan / affirming steps taken     Current / Ongoing Stressors and Concerns:    Patient reports she is concerned about her parents and them aging. Patient reports she is concerned about \"the world\". Patient reports she is concerned about her interpersonal " "relationships. Patient reports she is concerned about organization skills. Patient reports she is concerned about how her ADHD impacts social relationships.Patient reports concerns about rejection sensitivity. Patient reports she is concerned about her brother and his family. Patient reports she is concerned about the role she plays in her family dynamic. Patient reports she is concerned about anger.      Treatment Objective(s) Addressed in This Session:    Patient will learn about the diagnosis and symptoms of ADHD.     Patient will learn how ADHD can impact interpersonal relationships and social skills.    Client will learn about co-morbidities between ADHD and anxiety disorders.    Client will identify triggers of anxiety and process them in session.       Intervention:   DBT: reviewed the TIPP skill.   Motivational Interviewing: supported patient in processing and exploring feeling \"stagnant\". Validated patient's emotions. Supported patient in exploring productivity and what activities feel productive. Discussed how society impacts our views on productivity. Discussed ways that patient is able to center herself and relax. Discussed how sleeping can be a way that people with ADHD relax. Discussed patient's energy levels. Discussed patient's motivation levels. Discussed using body movement and exercise daily to help stabilize energy and motivation levels.     Assessments completed prior to visit:  The following assessments were completed by patient for this visit:  PHQ9:   PHQ-9 SCORE 10/31/2022 12/2/2022 1/3/2023 1/9/2023 2/1/2023 2/15/2023 3/22/2023   PHQ-9 Total Score MyChart - - - 6 (Mild depression) 2 (Minimal depression) 1 (Minimal depression) 6 (Mild depression)   PHQ-9 Total Score 15 3 6 6 2 1 6     GAD7:   DIONNE-7 SCORE 9/28/2022 10/31/2022 12/2/2022 1/3/2023 2/1/2023 2/15/2023 3/22/2023   Total Score - - - - 0 (minimal anxiety) - -   Total Score 4 13 6 5 0 2 6     PROMIS 10-Global Health (only subscores " and total score):   PROMIS-10 Scores Only 6/28/2022 7/19/2022 10/31/2022 12/14/2022 2/1/2023   Global Mental Health Score 11 11 13 12 15   Global Physical Health Score 15 16 14 16 16   PROMIS TOTAL - SUBSCORES 26 27 27 28 31         ASSESSMENT: Current Emotional / Mental Status (status of significant symptoms):   Risk status (Self / Other harm or suicidal ideation)   Patient denies current fears or concerns for personal safety.   Patient denies current or recent suicidal ideation or behaviors.   Patient denies current or recent homicidal ideation or behaviors.   Patient denies current or recent self injurious behavior or ideation.   Patient denies other safety concerns.   Patient reports there has been no change in risk factors since their last session.     Patient reports there has been no change in protective factors since their last session.     Recommended that patient call 911 or go to the local ED should there be a change in any of these risk factors.     Appearance:   Appropriate    Eye Contact:   Good    Psychomotor Behavior: Normal    Attitude:   Cooperative    Orientation:   All   Speech    Rate / Production: Hyperverbal     Volume:  Normal    Mood:    Anxious  Depressed    Affect:    Worrisome    Thought Content:  Rumination    Thought Form:  Coherent    Insight:    Good      Medication Review:   No current psychiatric medications prescribed     Medication Compliance:   Yes     Changes in Health Issues:   None reported     Chemical Use Review:   Substance Use: decrease in alcohol .  Patient reports frequency of use none.  Provided encouragement towards sobriety  Provided support and affirmation for steps taken towards sobriety         Tobacco Use: No current tobacco use.      Diagnosis:  1. Attention deficit hyperactivity disorder (ADHD), combined type    2. Mild episode of recurrent major depressive disorder (H)    3. Other specified anxiety disorders        Collateral Reports Completed:   Not  Applicable    PLAN: (Patient Tasks / Therapist Tasks / Other)  Patient will practice using the TIPP skills to reduce symptoms of irritability and anger. Patient will practice using body movement daily to help increase motivation levels and stabilize energy levels.   We will discuss next session.         Chaya Harmon, [facility]  3/29/2023  _____________________________________________________                                              Individual Treatment Plan    Patient's Name: Vidhi Tovar  YOB: 1980    Date of Creation: 11/9/2022  Date Treatment Plan Last Reviewed/Revised: 2/1/2023    DSM5 Diagnoses: Attention-Deficit/Hyperactivity Disorder  314.01 (F90.2) Combined presentation, 296.31 (F33.0) Major Depressive Disorder, Recurrent Episode, Mild _ or 300.09 (F41.8) Other Specified Anxiety Disorder   Psychosocial / Contextual Factors: Patient is self-employed. Patient is single. Patient is taking care of her elderly parents.   PROMIS (reviewed every 90 days):   The following assessments were completed by patient for this visit:  PROMIS 10-Global Health (only subscores and total score):   PROMIS-10 Scores Only 6/28/2022 7/19/2022 10/31/2022 12/14/2022 2/1/2023   Global Mental Health Score 11 11 13 12 15   Global Physical Health Score 15 16 14 16 16   PROMIS TOTAL - SUBSCORES 26 27 27 28 31       Referral / Collaboration:  Referral to another professional/service is not indicated at this time.    Anticipated number of session for this episode of care: 6-9 sessions  Anticipation frequency of session: Every other week  Anticipated Duration of each session: 38-52 minutes  Treatment plan will be reviewed in 90 days or when goals have been changed.       MeasurableTreatment Goal(s) related to diagnosis / functional impairment(s)  Goal 1:Patient will work on stabilizing symptoms of ADHD.      Objective #A  Patient will learn about the diagnosis and symptoms of ADHD.   Status: Continued - Date(s):  2/1/2023     Intervention(s)  Therapist will teach about the diagnosis of ADHD.     Objective #B  Patient will learn 3 skills to improve organization.                        Status: Continued - Date(s): 2/1/2023     Intervention(s)  Therapist will teach organizational skills.      Objective #C   Patient will learn how ADHD can impact interpersonal relationships and social skills.   Status: Continued - Date(s): 2/1/2023     Intervention(s)  Therapist will teach about the symptoms of ADHD and how a neuro-divergent brain tries to build social connections.        Goal 2: Client will work on stabilizing anxiety symptoms as evidenced by DIONNE-7 scores (current is 0) and Self report.  Goal is to maintain a score under 5.      Objective #A Client will identify triggers of anxiety and process them in session.    Status: Continued - Date(s): 2/1/2023    Intervention(s)  Therapist will teach emotional recognition/identification by assigning homework to journal with written exercises.    Objective #B  Client will identify initial signs or symptoms of anxiety.    Status: Continued - Date(s): 2/1/2023    Intervention(s)  Therapist will teach emotional regulation skills, such as deep breathing and mindfulness skills.    Objective #C  Client will learn about co-morbidities between ADHD and anxiety disorders.   Status: Continued - Date(s): 2/1/2023    Intervention(s)  Therapist will provide educational materials on anxiety disorders and ADHD.          Patient has reviewed and agreed to the above plan.      Chaya Harmon, [facility]   November 9, 2022 Reviewed 2/1/2023

## 2023-04-05 ENCOUNTER — VIRTUAL VISIT (OUTPATIENT)
Dept: PSYCHOLOGY | Facility: CLINIC | Age: 43
End: 2023-04-05
Payer: COMMERCIAL

## 2023-04-05 DIAGNOSIS — F90.2 ATTENTION DEFICIT HYPERACTIVITY DISORDER (ADHD), COMBINED TYPE: Primary | ICD-10-CM

## 2023-04-05 DIAGNOSIS — F41.8 OTHER SPECIFIED ANXIETY DISORDERS: ICD-10-CM

## 2023-04-05 DIAGNOSIS — F33.0 MILD EPISODE OF RECURRENT MAJOR DEPRESSIVE DISORDER (H): ICD-10-CM

## 2023-04-05 PROCEDURE — 90834 PSYTX W PT 45 MINUTES: CPT | Mod: VID

## 2023-04-05 ASSESSMENT — PATIENT HEALTH QUESTIONNAIRE - PHQ9
SUM OF ALL RESPONSES TO PHQ QUESTIONS 1-9: 4
SUM OF ALL RESPONSES TO PHQ QUESTIONS 1-9: 4
10. IF YOU CHECKED OFF ANY PROBLEMS, HOW DIFFICULT HAVE THESE PROBLEMS MADE IT FOR YOU TO DO YOUR WORK, TAKE CARE OF THINGS AT HOME, OR GET ALONG WITH OTHER PEOPLE: SOMEWHAT DIFFICULT

## 2023-04-05 NOTE — PROGRESS NOTES
"[facility]                                     Progress Note    Patient Name: Vidhi Tovar  Date: 4/5/2023         Service Type: Individual      Session Start Time: 10:30 AM  Session End Time: 11:17 PM     Session Length: 38-52 min    Session #:  13    Attendees: Client attended alone    Service Modality:  Video Visit:      Provider verified identity through the following two step process.  Patient provided:  Patient is known previously to provider    Telemedicine Visit: The patient's condition can be safely assessed and treated via synchronous audio and visual telemedicine encounter.      Reason for Telemedicine Visit: Patient has requested telehealth visit    Originating Site (Patient Location): Patient's home    Distant Site (Provider Location): Provider Remote Setting- Home Office    Consent:  The patient/guardian has verbally consented to: the potential risks and benefits of telemedicine (video visit) versus in person care; bill my insurance or make self-payment for services provided; and responsibility for payment of non-covered services.     Patient would like the video invitation sent by:  My Chart    Mode of Communication:  Video Conference via [facility]    Distant Location (Provider):  Off-site    As the provider I attest to compliance with applicable laws and regulations related to telemedicine.    DATA  Interactive Complexity: No  Crisis: No        Progress Since Last Session (Related to Symptoms / Goals / Homework):   Symptoms: No change based on patient self-report.     Homework: Partially completed      Episode of Care Goals: Satisfactory progress - ACTION (Actively working towards change); Intervened by reinforcing change plan / affirming steps taken     Current / Ongoing Stressors and Concerns:    Patient reports she is concerned about her parents and them aging. Patient reports she is concerned about \"the world\". Patient reports she is concerned about her interpersonal " relationships. Patient reports she is concerned about organization skills. Patient reports she is concerned about how her ADHD impacts social relationships.Patient reports concerns about rejection sensitivity. Patient reports she is concerned about her brother and his family. Patient reports she is concerned about the role she plays in her family dynamic. Patient reports she is concerned about anger.      Treatment Objective(s) Addressed in This Session:    Patient will learn about the diagnosis and symptoms of ADHD.     Patient will learn how ADHD can impact interpersonal relationships and social skills.    Client will learn about co-morbidities between ADHD and anxiety disorders.    Client will identify triggers of anxiety and process them in session.       Intervention:   Motivational Interviewing: supported patient in processing and exploring interpersonal relationships and they are impacted by her job. Validated patient's concerns. Supported patient in exploring how masking can impact her social relationships. Discussed how ADHD can impact social interactions. Reviewed rejection sensitivity. Discussed how individuals with ADHD connect to others differently than neurotypical people do. Discussed how the inconsistencies of her job impact interpersonal relationships. Discussed patient's energy level and whether physical moment was helpful in reducing fatigue.     Assessments completed prior to visit:  The following assessments were completed by patient for this visit:  PHQ9:       12/2/2022    10:00 AM 1/3/2023    11:00 AM 1/9/2023    12:26 PM 2/1/2023     9:28 AM 2/15/2023     5:23 AM 3/22/2023    10:18 AM 3/29/2023    10:20 AM   PHQ-9 SCORE   PHQ-9 Total Score MyChart   6 (Mild depression) 2 (Minimal depression) 1 (Minimal depression) 6 (Mild depression) 4 (Minimal depression)   PHQ-9 Total Score 3 6 6 2 1 6 4     GAD7:       9/28/2022     1:00 PM 10/31/2022     2:00 PM 12/2/2022    10:00 AM 1/3/2023    11:00 AM  2/1/2023     9:27 AM 2/15/2023     8:00 AM 3/22/2023    10:00 AM   DIONNE-7 SCORE   Total Score     0 (minimal anxiety)     Total Score 4 13 6 5 0 2 6     PROMIS 10-Global Health (only subscores and total score):       6/28/2022    11:31 PM 7/19/2022    11:49 AM 10/31/2022     2:19 PM 12/14/2022     8:31 AM 2/1/2023     9:29 AM   PROMIS-10 Scores Only   Global Mental Health Score 11 11 13 12 15   Global Physical Health Score 15 16 14 16 16   PROMIS TOTAL - SUBSCORES 26 27 27 28 31         ASSESSMENT: Current Emotional / Mental Status (status of significant symptoms):   Risk status (Self / Other harm or suicidal ideation)   Patient denies current fears or concerns for personal safety.   Patient denies current or recent suicidal ideation or behaviors.   Patient denies current or recent homicidal ideation or behaviors.   Patient denies current or recent self injurious behavior or ideation.   Patient denies other safety concerns.   Patient reports there has been no change in risk factors since their last session.     Patient reports there has been no change in protective factors since their last session.     Recommended that patient call 911 or go to the local ED should there be a change in any of these risk factors.     Appearance:   Appropriate    Eye Contact:   Good    Psychomotor Behavior: Normal    Attitude:   Cooperative    Orientation:   All   Speech    Rate / Production: Hyperverbal     Volume:  Normal    Mood:    Anxious  Depressed    Affect:    Worrisome    Thought Content:  Rumination    Thought Form:  Coherent    Insight:    Good      Medication Review:   No current psychiatric medications prescribed     Medication Compliance:   Yes     Changes in Health Issues:   None reported     Chemical Use Review:   Substance Use: decrease in alcohol .  Patient reports frequency of use none.  Provided encouragement towards sobriety  Provided support and affirmation for steps taken towards sobriety         Tobacco Use: No  current tobacco use.      Diagnosis:  1. Attention deficit hyperactivity disorder (ADHD), combined type    2. Mild episode of recurrent major depressive disorder (H)    3. Other specified anxiety disorders        Collateral Reports Completed:   Not Applicable    PLAN: (Patient Tasks / Therapist Tasks / Other)  Patient will continue to use physical activity to help regulate emotions and increase energy levels. Patient will be mindful of how her interpretation of other's perceptions of her impact her behavior.   We will discuss next session.         Chaya Harmon, [facility]  4/5/2023  _____________________________________________________                                              Individual Treatment Plan    Patient's Name: Vidhi Tovar  YOB: 1980    Date of Creation: 11/9/2022  Date Treatment Plan Last Reviewed/Revised: 2/1/2023    DSM5 Diagnoses: Attention-Deficit/Hyperactivity Disorder  314.01 (F90.2) Combined presentation, 296.31 (F33.0) Major Depressive Disorder, Recurrent Episode, Mild _ or 300.09 (F41.8) Other Specified Anxiety Disorder   Psychosocial / Contextual Factors: Patient is self-employed. Patient is single. Patient is taking care of her elderly parents.   PROMIS (reviewed every 90 days):   The following assessments were completed by patient for this visit:  PROMIS 10-Global Health (only subscores and total score):       6/28/2022    11:31 PM 7/19/2022    11:49 AM 10/31/2022     2:19 PM 12/14/2022     8:31 AM 2/1/2023     9:29 AM   PROMIS-10 Scores Only   Global Mental Health Score 11 11 13 12 15   Global Physical Health Score 15 16 14 16 16   PROMIS TOTAL - SUBSCORES 26 27 27 28 31       Referral / Collaboration:  Referral to another professional/service is not indicated at this time.    Anticipated number of session for this episode of care: 6-9 sessions  Anticipation frequency of session: Every other week  Anticipated Duration of each session: 38-52 minutes  Treatment plan will  be reviewed in 90 days or when goals have been changed.       MeasurableTreatment Goal(s) related to diagnosis / functional impairment(s)  Goal 1:Patient will work on stabilizing symptoms of ADHD.      Objective #A  Patient will learn about the diagnosis and symptoms of ADHD.   Status: Continued - Date(s): 2/1/2023     Intervention(s)  Therapist will teach about the diagnosis of ADHD.     Objective #B  Patient will learn 3 skills to improve organization.                        Status: Continued - Date(s): 2/1/2023     Intervention(s)  Therapist will teach organizational skills.      Objective #C   Patient will learn how ADHD can impact interpersonal relationships and social skills.   Status: Continued - Date(s): 2/1/2023     Intervention(s)  Therapist will teach about the symptoms of ADHD and how a neuro-divergent brain tries to build social connections.        Goal 2: Client will work on stabilizing anxiety symptoms as evidenced by DIONNE-7 scores (current is 0) and Self report.  Goal is to maintain a score under 5.      Objective #A Client will identify triggers of anxiety and process them in session.    Status: Continued - Date(s): 2/1/2023    Intervention(s)  Therapist will teach emotional recognition/identification by assigning homework to journal with written exercises.    Objective #B  Client will identify initial signs or symptoms of anxiety.    Status: Continued - Date(s): 2/1/2023    Intervention(s)  Therapist will teach emotional regulation skills, such as deep breathing and mindfulness skills.    Objective #C  Client will learn about co-morbidities between ADHD and anxiety disorders.   Status: Continued - Date(s): 2/1/2023    Intervention(s)  Therapist will provide educational materials on anxiety disorders and ADHD.          Patient has reviewed and agreed to the above plan.      Chaya Harmon, [facility]   November 9, 2022 Reviewed 2/1/2023

## 2023-04-11 ENCOUNTER — TELEPHONE (OUTPATIENT)
Dept: PSYCHOLOGY | Facility: CLINIC | Age: 43
End: 2023-04-11
Payer: COMMERCIAL

## 2023-04-11 NOTE — TELEPHONE ENCOUNTER
Provider called patient to offer an open session on 4/12. Provider gave patient the phone number to behavioral access and reminded her that she can also schedule through [employer]. Provider reminded patient of her next scheduled session on 4/18.

## 2023-04-14 ENCOUNTER — MYC REFILL (OUTPATIENT)
Dept: PSYCHIATRY | Facility: CLINIC | Age: 43
End: 2023-04-14
Payer: COMMERCIAL

## 2023-04-14 DIAGNOSIS — F90.0 ADHD (ATTENTION DEFICIT HYPERACTIVITY DISORDER), INATTENTIVE TYPE: ICD-10-CM

## 2023-04-14 DIAGNOSIS — F41.9 ANXIETY: ICD-10-CM

## 2023-04-17 NOTE — TELEPHONE ENCOUNTER
Date of Last Office Visit: 1/10/23  Date of Next Office Visit: 5/2/23  No shows since last visit: 0  Cancellations since last visit: 0    Medication requested: buPROPion (WELLBUTRIN XL) 150 MG 24 hr tablet Date last ordered: 3/14/23 Qty: 30 Refills: 0     Medication requested: ALPRAZolam (XANAX) 0.25 MG tablet Date last ordered: 2/14/23 Qty: 10 Refills: 0     Review of [place] ?: access not granted    Lapse in medication adherence greater than 5 days?: no  If yes, call patient and gather details: na  Medication refill request verified as identical to current order?: yes  Result of Last DAM, VPA, Li+ Level, CBC, or Carbamazepine Level (at or since last visit): N/A    Last visit treatment plan:   Tolerating the bupropion  mg with positive effect on increased motivation, attention and energy.  Monitoring to see if irritable, quick to react to minimal triggers is increased or if it is coincidental.     Follow-up six weeks             1. ADHD (attention deficit hyperactivity disorder), inattentive type 12/14/2022       Relevant Medications       buPROPion (WELLBUTRIN XL) 150 MG 24 hr tablet         []Medication refilled per  Medication Refill in Ambulatory Care  policy.  [x]Medication unable to be refilled by RN due to criteria not met as indicated below:    []Eligibility - not seen in the last year   []Supervision - no future appointment   []Compliance - no shows, cancellations or lapse in therapy   []Verification - order discrepancy   [x]Controlled medication   []Medication not included in policy   []90-day supply request   []Other

## 2023-04-18 ENCOUNTER — MYC REFILL (OUTPATIENT)
Dept: PSYCHIATRY | Facility: CLINIC | Age: 43
End: 2023-04-18
Payer: COMMERCIAL

## 2023-04-18 ENCOUNTER — VIRTUAL VISIT (OUTPATIENT)
Dept: PSYCHOLOGY | Facility: CLINIC | Age: 43
End: 2023-04-18
Payer: COMMERCIAL

## 2023-04-18 DIAGNOSIS — F41.8 OTHER SPECIFIED ANXIETY DISORDERS: ICD-10-CM

## 2023-04-18 DIAGNOSIS — F41.9 ANXIETY: ICD-10-CM

## 2023-04-18 DIAGNOSIS — F33.0 MILD EPISODE OF RECURRENT MAJOR DEPRESSIVE DISORDER (H): ICD-10-CM

## 2023-04-18 DIAGNOSIS — F90.2 ATTENTION DEFICIT HYPERACTIVITY DISORDER (ADHD), COMBINED TYPE: Primary | ICD-10-CM

## 2023-04-18 DIAGNOSIS — F90.0 ADHD (ATTENTION DEFICIT HYPERACTIVITY DISORDER), INATTENTIVE TYPE: ICD-10-CM

## 2023-04-18 PROCEDURE — 90834 PSYTX W PT 45 MINUTES: CPT | Mod: VID

## 2023-04-18 RX ORDER — BUPROPION HYDROCHLORIDE 150 MG/1
150 TABLET ORAL EVERY MORNING
Qty: 30 TABLET | Refills: 0 | Status: CANCELLED | OUTPATIENT
Start: 2023-04-18

## 2023-04-18 RX ORDER — ALPRAZOLAM 0.25 MG
0.25 TABLET ORAL 3 TIMES DAILY PRN
Qty: 10 TABLET | Refills: 0 | OUTPATIENT
Start: 2023-04-18

## 2023-04-18 RX ORDER — BUPROPION HYDROCHLORIDE 150 MG/1
150 TABLET ORAL EVERY MORNING
Qty: 30 TABLET | Refills: 0 | OUTPATIENT
Start: 2023-04-18

## 2023-04-18 NOTE — TELEPHONE ENCOUNTER
Reason for call:  Medication   If this is a refill request, has the caller requested the refill from the pharmacy already? Yes  Will the patient be using a [facility]? No  Name of the pharmacy and phone number for the current request:     [facility] [place]    Name of the medication requested:  buPROPion (WELLBUTRIN XL)     Other request: pt advised she needs her med refilled    Phone number to reach patient:  Home number on file [phone] (home)    Best Time:  asap    Can we leave a detailed message on this number?  YES    Travel screening: Not Applicable

## 2023-04-18 NOTE — PROGRESS NOTES
"[facility]                                     Progress Note    Patient Name: Vidhi Tovar  Date: 4/18/2023       Service Type: Individual      Session Start Time: 12:32 PM  Session End Time: 1:18 PM     Session Length: 38-52 min    Session #:  14    Attendees: Client attended alone    Service Modality:  Video Visit:      Provider verified identity through the following two step process.  Patient provided:  Patient is known previously to provider    Telemedicine Visit: The patient's condition can be safely assessed and treated via synchronous audio and visual telemedicine encounter.      Reason for Telemedicine Visit: Patient has requested telehealth visit    Originating Site (Patient Location): Patient's home    Distant Site (Provider Location): Provider Remote Setting- Home Office    Consent:  The patient/guardian has verbally consented to: the potential risks and benefits of telemedicine (video visit) versus in person care; bill my insurance or make self-payment for services provided; and responsibility for payment of non-covered services.     Patient would like the video invitation sent by:  My Chart    Mode of Communication:  Video Conference via [facility]    Distant Location (Provider):  Off-site    As the provider I attest to compliance with applicable laws and regulations related to telemedicine.    DATA  Interactive Complexity: No  Crisis: No        Progress Since Last Session (Related to Symptoms / Goals / Homework):   Symptoms: No change based on patient self-report.     Homework: Partially completed      Episode of Care Goals: Satisfactory progress - ACTION (Actively working towards change); Intervened by reinforcing change plan / affirming steps taken     Current / Ongoing Stressors and Concerns:    Patient reports she is concerned about her parents and them aging. Patient reports she is concerned about \"the world\". Patient reports she is concerned about her interpersonal " relationships. Patient reports she is concerned about organization skills. Patient reports she is concerned about how her ADHD impacts social relationships.Patient reports concerns about rejection sensitivity. Patient reports she is concerned about her brother and his family. Patient reports she is concerned about the role she plays in her family dynamic. Patient reports she is concerned about anger. Patient reports she is concerned about her sleep habits.      Treatment Objective(s) Addressed in This Session:    Patient will learn about the diagnosis and symptoms of ADHD.     Patient will learn how ADHD can impact interpersonal relationships and social skills.    Client will learn about co-morbidities between ADHD and anxiety disorders.    Client will identify triggers of anxiety and process them in session.       Intervention:   Motivational Interviewing: supported patient in processing and exploring her work on reducing self-jdugments. Validated patient's use of skills. Discussed how patient has been able to replace self-judgments with acceptance of self. Supported patient in exploring self-judgments related to her sleep habits. Validated patient's emotions. Supported patient in exploring what disrupts her sleep habits. Discussed having separate spaces for being awake and sleeping. Discussed keeping the phone out of the bedroom to create physical space between the two. Reviewed dopamine seeking and immediate versus delayed gratification.     Assessments completed prior to visit:  The following assessments were completed by patient for this visit:  PHQ9:       1/3/2023    11:00 AM 1/9/2023    12:26 PM 2/1/2023     9:28 AM 2/15/2023     5:23 AM 3/22/2023    10:18 AM 3/29/2023    10:20 AM 4/5/2023    10:27 AM   PHQ-9 SCORE   PHQ-9 Total Score MyChart  6 (Mild depression) 2 (Minimal depression) 1 (Minimal depression) 6 (Mild depression) 4 (Minimal depression) 4 (Minimal depression)   PHQ-9 Total Score 6 6 2 1 6 4 4      GAD7:       9/28/2022     1:00 PM 10/31/2022     2:00 PM 12/2/2022    10:00 AM 1/3/2023    11:00 AM 2/1/2023     9:27 AM 2/15/2023     8:00 AM 3/22/2023    10:00 AM   DIONNE-7 SCORE   Total Score     0 (minimal anxiety)     Total Score 4 13 6 5 0 2 6     PROMIS 10-Global Health (only subscores and total score):       6/28/2022    11:31 PM 7/19/2022    11:49 AM 10/31/2022     2:19 PM 12/14/2022     8:31 AM 2/1/2023     9:29 AM   PROMIS-10 Scores Only   Global Mental Health Score 11 11 13 12 15   Global Physical Health Score 15 16 14 16 16   PROMIS TOTAL - SUBSCORES 26 27 27 28 31         ASSESSMENT: Current Emotional / Mental Status (status of significant symptoms):   Risk status (Self / Other harm or suicidal ideation)   Patient denies current fears or concerns for personal safety.   Patient denies current or recent suicidal ideation or behaviors.   Patient denies current or recent homicidal ideation or behaviors.   Patient denies current or recent self injurious behavior or ideation.   Patient denies other safety concerns.   Patient reports there has been no change in risk factors since their last session.     Patient reports there has been no change in protective factors since their last session.     Recommended that patient call 911 or go to the local ED should there be a change in any of these risk factors.     Appearance:   Appropriate    Eye Contact:   Good    Psychomotor Behavior: Normal    Attitude:   Cooperative    Orientation:   All   Speech    Rate / Production: Hyperverbal     Volume:  Normal    Mood:    Anxious  Depressed    Affect:    Worrisome    Thought Content:  Rumination    Thought Form:  Coherent    Insight:    Good      Medication Review:   No current psychiatric medications prescribed     Medication Compliance:   Yes     Changes in Health Issues:   None reported     Chemical Use Review:   Substance Use: Problem use continues with no change since last session, Not addressed in session         Tobacco Use: No current tobacco use.      Diagnosis:  1. Attention deficit hyperactivity disorder (ADHD), combined type    2. Mild episode of recurrent major depressive disorder (H)    3. Other specified anxiety disorders        Collateral Reports Completed:   Not Applicable    PLAN: (Patient Tasks / Therapist Tasks / Other)  Patient will be mindful of self-judgments and practice reducing them. Patient will practice leaving her phone outside of bedroom at bedtime.  We will discuss next session.         Chaya Harmon, [facility]  4/18/2023  _____________________________________________________                                              Individual Treatment Plan    Patient's Name: Vidhi Tovar  YOB: 1980    Date of Creation: 11/9/2022  Date Treatment Plan Last Reviewed/Revised: 2/1/2023    DSM5 Diagnoses: Attention-Deficit/Hyperactivity Disorder  314.01 (F90.2) Combined presentation, 296.31 (F33.0) Major Depressive Disorder, Recurrent Episode, Mild _ or 300.09 (F41.8) Other Specified Anxiety Disorder   Psychosocial / Contextual Factors: Patient is self-employed. Patient is single. Patient is taking care of her elderly parents.   PROMIS (reviewed every 90 days):   The following assessments were completed by patient for this visit:  PROMIS 10-Global Health (only subscores and total score):       6/28/2022    11:31 PM 7/19/2022    11:49 AM 10/31/2022     2:19 PM 12/14/2022     8:31 AM 2/1/2023     9:29 AM   PROMIS-10 Scores Only   Global Mental Health Score 11 11 13 12 15   Global Physical Health Score 15 16 14 16 16   PROMIS TOTAL - SUBSCORES 26 27 27 28 31       Referral / Collaboration:  Referral to another professional/service is not indicated at this time.    Anticipated number of session for this episode of care: 6-9 sessions  Anticipation frequency of session: Every other week  Anticipated Duration of each session: 38-52 minutes  Treatment plan will be reviewed in 90 days or when goals have  been changed.       MeasurableTreatment Goal(s) related to diagnosis / functional impairment(s)  Goal 1:Patient will work on stabilizing symptoms of ADHD.      Objective #A  Patient will learn about the diagnosis and symptoms of ADHD.   Status: Continued - Date(s): 2/1/2023     Intervention(s)  Therapist will teach about the diagnosis of ADHD.     Objective #B  Patient will learn 3 skills to improve organization.                        Status: Continued - Date(s): 2/1/2023     Intervention(s)  Therapist will teach organizational skills.      Objective #C   Patient will learn how ADHD can impact interpersonal relationships and social skills.   Status: Continued - Date(s): 2/1/2023     Intervention(s)  Therapist will teach about the symptoms of ADHD and how a neuro-divergent brain tries to build social connections.        Goal 2: Client will work on stabilizing anxiety symptoms as evidenced by DIONNE-7 scores (current is 0) and Self report.  Goal is to maintain a score under 5.      Objective #A Client will identify triggers of anxiety and process them in session.    Status: Continued - Date(s): 2/1/2023    Intervention(s)  Therapist will teach emotional recognition/identification by assigning homework to journal with written exercises.    Objective #B  Client will identify initial signs or symptoms of anxiety.    Status: Continued - Date(s): 2/1/2023    Intervention(s)  Therapist will teach emotional regulation skills, such as deep breathing and mindfulness skills.    Objective #C  Client will learn about co-morbidities between ADHD and anxiety disorders.   Status: Continued - Date(s): 2/1/2023    Intervention(s)  Therapist will provide educational materials on anxiety disorders and ADHD.          Patient has reviewed and agreed to the above plan.      Chaya Harmon, [facility]   November 9, 2022 Reviewed 2/1/2023

## 2023-04-19 ENCOUNTER — TELEPHONE (OUTPATIENT)
Dept: FAMILY MEDICINE | Facility: CLINIC | Age: 43
End: 2023-04-19

## 2023-04-19 DIAGNOSIS — F90.0 ADHD (ATTENTION DEFICIT HYPERACTIVITY DISORDER), INATTENTIVE TYPE: ICD-10-CM

## 2023-04-19 RX ORDER — ALPRAZOLAM 0.25 MG
0.25 TABLET ORAL 3 TIMES DAILY PRN
Qty: 10 TABLET | Refills: 0 | Status: SHIPPED | OUTPATIENT
Start: 2023-04-19 | End: 2023-06-23

## 2023-04-19 RX ORDER — BUPROPION HYDROCHLORIDE 150 MG/1
150 TABLET ORAL EVERY MORNING
Qty: 90 TABLET | Refills: 1 | Status: SHIPPED | OUTPATIENT
Start: 2023-04-19 | End: 2023-10-20

## 2023-04-19 NOTE — TELEPHONE ENCOUNTER
Date of Last Office Visit: 1/10/2023  Date of Next Office Visit: 5/2/2023  No shows since last visit: None  Cancellations since last visit: 2/24/2023    Medication requested: alprazolam 0.25 mg tab Date last ordered: 2/14/2023 Qty: 10 Refills: 0     Review of [place] ?: No, not a delegate    Lapse in medication adherence greater than 5 days?: N/A- prn medication  If yes, call patient and gather details: N/A  Medication refill request verified as identical to current order?: Yes  Result of Last DAM, VPA, Li+ Level, CBC, or Carbamazepine Level (at or since last visit): N/A    Last visit treatment plan:   ASSESSMENT AND PLAN               Problem List as of 1/10/2023 Reviewed: 1/10/2023 12:58 PM by Venice Alejandra APRN CNP           Noted             Behavioral     Last System Assessment & Plan 1/10/2023 Virtual Visit Edited 1/10/2023  1:00 PM by Venice Alejandra APRN CNP       Tolerating the bupropion  mg with positive effect on increased motivation, attention and energy.  Monitoring to see if irritable, quick to react to minimal triggers is increased or if it is coincidental.     Follow-up six weeks           Note from 12/14/2023 Assessment and Plan:  ASSESSMENT AND PLAN    Vidhi Tovar is a 42 year old White Not  or  female presenting for psychiatric evaluation and medication management through the [facility].  Information is obtained from patient and available records.  Reports history of depression, anxiety and newly formal diagnosis of ADHD.  Denies prior psychiatric hospitalizations. No history of suicidal thoughts or attempts. No history of self-injurious behaviors. No identified genetic load for psychiatric illnesses. Grew up in an intact home with all basic needs being met.             Problem List Items Addressed This Visit               Behavioral       Start bupropion  mg targeting depression and executive functioning impairment  (inability to focus, impaired organization and planning, reduced working memory). Using small amount of alprazolam for panic appropriately     Follow-up in 4 weeks               ADHD (attention deficit hyperactivity disorder), inattentive type - Primary     Relevant Medications     buPROPion (WELLBUTRIN XL) 150 MG 24 hr tablet     Depression, unspecified depression type     Relevant Medications     buPROPion (WELLBUTRIN XL) 150 MG 24 hr tablet     ALPRAZolam (XANAX) 0.25 MG tablet       Other Visit Diagnoses      Anxiety         Relevant Medications     buPROPion (WELLBUTRIN XL) 150 MG 24 hr tablet     ALPRAZolam (XANAX) 0.25 MG tablet       []Medication refilled per  Medication Refill in Ambulatory Care  policy.  [x]Medication unable to be refilled by RN due to criteria not met as indicated below:    []Eligibility - not seen in the last year   []Supervision - no future appointment   []Compliance - no shows, cancellations or lapse in therapy   []Verification - order discrepancy   [x]Controlled medication    [x]Medication not included in policy   []90-day supply request   []Other

## 2023-04-19 NOTE — TELEPHONE ENCOUNTER
Patient called trying to get the buPROPion (WELLBUTRIN XL) 150 MG 24 hr tablet refilled. Pt spoke with psychiatry but has not heard back from them and said [facility] would fill it. Informed patient that writer will pass this message along. Would like a call back in regards to this.    Pharmacy: [id number] - [place], [place] - [address] [facility]    Renetta PEREZ CMA 11:50 AM 4/19/2023

## 2023-04-20 NOTE — TELEPHONE ENCOUNTER
Per chart, Venice Alejandra sent alprazolam refill yesterday and patient's PCP sent bupropion refill yesterday.    Lata Meng RN on 4/20/2023 at 11:36 AM

## 2023-04-20 NOTE — TELEPHONE ENCOUNTER
Per chart, alprazolam refill was sent by Venice Alejandra yesterday.    Lata Meng RN on 4/20/2023 at 11:35 AM

## 2023-05-18 ENCOUNTER — VIRTUAL VISIT (OUTPATIENT)
Dept: PSYCHOLOGY | Facility: CLINIC | Age: 43
End: 2023-05-18
Payer: COMMERCIAL

## 2023-05-18 DIAGNOSIS — F90.2 ATTENTION DEFICIT HYPERACTIVITY DISORDER (ADHD), COMBINED TYPE: Primary | ICD-10-CM

## 2023-05-18 DIAGNOSIS — F33.0 MILD EPISODE OF RECURRENT MAJOR DEPRESSIVE DISORDER (H): ICD-10-CM

## 2023-05-18 DIAGNOSIS — F41.8 OTHER SPECIFIED ANXIETY DISORDERS: ICD-10-CM

## 2023-05-18 PROCEDURE — 90834 PSYTX W PT 45 MINUTES: CPT | Mod: VID

## 2023-05-18 ASSESSMENT — ANXIETY QUESTIONNAIRES
6. BECOMING EASILY ANNOYED OR IRRITABLE: MORE THAN HALF THE DAYS
4. TROUBLE RELAXING: NOT AT ALL
3. WORRYING TOO MUCH ABOUT DIFFERENT THINGS: SEVERAL DAYS
GAD7 TOTAL SCORE: 5
5. BEING SO RESTLESS THAT IT IS HARD TO SIT STILL: SEVERAL DAYS
7. FEELING AFRAID AS IF SOMETHING AWFUL MIGHT HAPPEN: NOT AT ALL
IF YOU CHECKED OFF ANY PROBLEMS ON THIS QUESTIONNAIRE, HOW DIFFICULT HAVE THESE PROBLEMS MADE IT FOR YOU TO DO YOUR WORK, TAKE CARE OF THINGS AT HOME, OR GET ALONG WITH OTHER PEOPLE: SOMEWHAT DIFFICULT
2. NOT BEING ABLE TO STOP OR CONTROL WORRYING: NOT AT ALL
1. FEELING NERVOUS, ANXIOUS, OR ON EDGE: SEVERAL DAYS
GAD7 TOTAL SCORE: 5

## 2023-05-18 ASSESSMENT — COLUMBIA-SUICIDE SEVERITY RATING SCALE - C-SSRS
1. SINCE LAST CONTACT, HAVE YOU WISHED YOU WERE DEAD OR WISHED YOU COULD GO TO SLEEP AND NOT WAKE UP?: NO
ATTEMPT SINCE LAST CONTACT: NO
SUICIDE, SINCE LAST CONTACT: NO
6. HAVE YOU EVER DONE ANYTHING, STARTED TO DO ANYTHING, OR PREPARED TO DO ANYTHING TO END YOUR LIFE?: NO
2. HAVE YOU ACTUALLY HAD ANY THOUGHTS OF KILLING YOURSELF?: NO
TOTAL  NUMBER OF ABORTED OR SELF INTERRUPTED ATTEMPTS SINCE LAST CONTACT: NO
TOTAL  NUMBER OF INTERRUPTED ATTEMPTS SINCE LAST CONTACT: NO

## 2023-05-18 ASSESSMENT — PATIENT HEALTH QUESTIONNAIRE - PHQ9: SUM OF ALL RESPONSES TO PHQ QUESTIONS 1-9: 3

## 2023-05-18 NOTE — PROGRESS NOTES
"[facility]                                     Progress Note    Patient Name: Vidhi Tovar  Date: 5/18/2023       Service Type: Individual      Session Start Time: 10:32 AM  Session End Time: 11:18 AM     Session Length: 38-52 min    Session #:  15    Attendees: Client attended alone    Service Modality:  Video Visit:      Provider verified identity through the following two step process.  Patient provided:  Patient is known previously to provider    Telemedicine Visit: The patient's condition can be safely assessed and treated via synchronous audio and visual telemedicine encounter.      Reason for Telemedicine Visit: Patient has requested telehealth visit    Originating Site (Patient Location): Patient's home    Distant Site (Provider Location): Provider Remote Setting- Home Office    Consent:  The patient/guardian has verbally consented to: the potential risks and benefits of telemedicine (video visit) versus in person care; bill my insurance or make self-payment for services provided; and responsibility for payment of non-covered services.     Patient would like the video invitation sent by:  My Chart    Mode of Communication:  [facility]    Distant Location (Provider):  Off-site    As the provider I attest to compliance with applicable laws and regulations related to telemedicine.    DATA  Interactive Complexity: No  Crisis: No        Progress Since Last Session (Related to Symptoms / Goals / Homework):   Symptoms: Improving based on PHQ-9 and DIONNE-7 scores and patient self-report.     Homework: Partially completed      Episode of Care Goals: Satisfactory progress - ACTION (Actively working towards change); Intervened by reinforcing change plan / affirming steps taken     Current / Ongoing Stressors and Concerns:    Patient reports she is concerned about her parents and them aging. Patient reports she is concerned about \"the world\". Patient reports she is concerned " about her interpersonal relationships. Patient reports she is concerned about organization skills. Patient reports she is concerned about how her ADHD impacts social relationships.Patient reports concerns about rejection sensitivity. Patient reports she is concerned about her brother and his family. Patient reports she is concerned about the role she plays in her family dynamic. Patient reports she is concerned about anger. Patient reports she is concerned about her sleep habits. Patient reports she is concerned about impulsivity.      Treatment Objective(s) Addressed in This Session:    Patient will learn about the diagnosis and symptoms of ADHD.     Patient will learn how ADHD can impact interpersonal relationships and social skills.    Client will learn about co-morbidities between ADHD and anxiety disorders.    Client will identify triggers of anxiety and process them in session.       Intervention:   Motivational Interviewing: supported patient in processing and exploring current stressors. Validated patient's emotions. Reviewed how individuals with ADHD have a dopamine deficiency. Discussed how this can lead to overindulging. Supported patient in exploring how impulsivity increases overindulgence. Discussed putting time and barriers between the urge to act impulsively and the behavior. Supported patient in thinking of what these barriers could be. Discussed being mindful of whether this is effective.    Reviewed treatment plan and goals with patient.     Assessments completed prior to visit:  The following assessments were completed by patient for this visit:  PHQ9:       1/9/2023    12:26 PM 2/1/2023     9:28 AM 2/15/2023     5:23 AM 3/22/2023    10:18 AM 3/29/2023    10:20 AM 4/5/2023    10:27 AM 5/18/2023    10:00 AM   PHQ-9 SCORE   PHQ-9 Total Score MyChart 6 (Mild depression) 2 (Minimal depression) 1 (Minimal depression) 6 (Mild depression) 4 (Minimal depression) 4 (Minimal depression)    PHQ-9 Total  Score 6 2 1 6 4 4 3     GAD7:       10/31/2022     2:00 PM 12/2/2022    10:00 AM 1/3/2023    11:00 AM 2/1/2023     9:27 AM 2/15/2023     8:00 AM 3/22/2023    10:00 AM 5/18/2023    10:00 AM   DIONNE-7 SCORE   Total Score    0 (minimal anxiety)      Total Score 13 6 5 0 2 6 5     PROMIS 10-Global Health (only subscores and total score):       6/28/2022    11:31 PM 7/19/2022    11:49 AM 10/31/2022     2:19 PM 12/14/2022     8:31 AM 2/1/2023     9:29 AM 5/18/2023    10:35 AM   PROMIS-10 Scores Only   Global Mental Health Score 11 11 13 12 15 14   Global Physical Health Score 15 16 14 16 16 16   PROMIS TOTAL - SUBSCORES 26 27 27 28 31 30         ASSESSMENT: Current Emotional / Mental Status (status of significant symptoms):   Risk status (Self / Other harm or suicidal ideation)   Patient denies current fears or concerns for personal safety.   Patient denies current or recent suicidal ideation or behaviors.   Patient denies current or recent homicidal ideation or behaviors.   Patient denies current or recent self injurious behavior or ideation.   Patient denies other safety concerns.   Patient reports there has been no change in risk factors since their last session.     Patient reports there has been no change in protective factors since their last session.     Recommended that patient call 911 or go to the local ED should there be a change in any of these risk factors.     Appearance:   Appropriate    Eye Contact:   Good    Psychomotor Behavior: Normal    Attitude:   Cooperative    Orientation:   All   Speech    Rate / Production: Hyperverbal     Volume:  Normal    Mood:    Anxious  Depressed    Affect:    Worrisome    Thought Content:  Rumination    Thought Form:  Coherent    Insight:    Good      Medication Review:   No current psychiatric medications prescribed     Medication Compliance:   Yes     Changes in Health Issues:   None reported     Chemical Use Review:   Substance Use: Problem use continues with no change  since last session, Not addressed in session        Tobacco Use: No current tobacco use.      Diagnosis:  1. Attention deficit hyperactivity disorder (ADHD), combined type    2. Mild episode of recurrent major depressive disorder (H)    3. Other specified anxiety disorders        Collateral Reports Completed:   Not Applicable    PLAN: (Patient Tasks / Therapist Tasks / Other)  Patient will practice putting time between her urge to act impulsively and the behavior. Patient will be mindful of whether this is effective.  We will discuss next session.         Chaya Harmon, [facility]  5/18/2023  _____________________________________________________                                              Individual Treatment Plan    Patient's Name: Vidhi Tovar  YOB: 1980    Date of Creation: 11/9/2022  Date Treatment Plan Last Reviewed/Revised: 5/18/2023    DSM5 Diagnoses: Attention-Deficit/Hyperactivity Disorder  314.01 (F90.2) Combined presentation, 296.31 (F33.0) Major Depressive Disorder, Recurrent Episode, Mild _ or 300.09 (F41.8) Other Specified Anxiety Disorder   Psychosocial / Contextual Factors: Patient is self-employed. Patient is single. Patient is taking care of her elderly parents.   PROMIS (reviewed every 90 days):   The following assessments were completed by patient for this visit:  PROMIS 10-Global Health (only subscores and total score):       6/28/2022    11:31 PM 7/19/2022    11:49 AM 10/31/2022     2:19 PM 12/14/2022     8:31 AM 2/1/2023     9:29 AM 5/18/2023    10:35 AM   PROMIS-10 Scores Only   Global Mental Health Score 11 11 13 12 15 14   Global Physical Health Score 15 16 14 16 16 16   PROMIS TOTAL - SUBSCORES 26 27 27 28 31 30       Referral / Collaboration:  Referral to another professional/service is not indicated at this time.    Anticipated number of session for this episode of care: 6-9 sessions  Anticipation frequency of session: Every other week  Anticipated Duration of each  session: 38-52 minutes  Treatment plan will be reviewed in 90 days or when goals have been changed. There has been demonstrated improvement in functioning while patient has been engaged in psychotherapy/psychological service- if withdrawn the patient would deteriorate and/or relapse.       MeasurableTreatment Goal(s) related to diagnosis / functional impairment(s)  Goal 1:Patient will work on stabilizing symptoms of ADHD.      Objective #A  Patient will learn about the diagnosis and symptoms of ADHD.   Status: Continued - Date(s): 5/18/2023     Intervention(s)  Therapist will teach about the diagnosis of ADHD.     Objective #B  Patient will learn 3 skills to improve organization.                        Status: Continued - Date(s): 5/18/2023     Intervention(s)  Therapist will teach organizational skills.      Objective #C   Patient will learn how ADHD can impact interpersonal relationships and social skills.   Status: Continued - Date(s): 5/18/2023     Intervention(s)  Therapist will teach about the symptoms of ADHD and how a neuro-divergent brain tries to build social connections.        Goal 2: Client will work on stabilizing anxiety symptoms as evidenced by DIONNE-7 scores (current is 5) and Self report.  Goal is to maintain this score.      Objective #A Client will identify triggers of anxiety and process them in session.    Status: Continued - Date(s): 5/18/2023    Intervention(s)  Therapist will teach emotional recognition/identification by assigning homework to journal with written exercises.    Objective #B  Client will identify initial signs or symptoms of anxiety.    Status: Continued - Date(s): 5/18/2023    Intervention(s)  Therapist will teach emotional regulation skills, such as deep breathing and mindfulness skills.    Objective #C  Client will learn about co-morbidities between ADHD and anxiety disorders.   Status: Continued - Date(s): 5/18/2023    Intervention(s)  Therapist will provide educational  materials on anxiety disorders and ADHD.          Patient has reviewed and agreed to the above plan.      Chaya Harmon, [facility]   November 9, 2022 Reviewed 2/1/2023 Re-reviewed 5/18/2023

## 2023-06-08 NOTE — PROGRESS NOTES
"[facility]   [facility]      Behavioral Health Clinician Progress Note    Patient Name: Vidhi Tovar  \"Mindy\"          Service Type:  Individual      Service Location:   [facility] / Email (patient reached)     Session Start Time: 1030am  Session End Time: 1041am      Session Length: 11 min     Attendees: Patient     Service Modality:  Video Visit:      Provider verified identity through the following two step process.  Patient provided:  Patient  and Patient address    Telemedicine Visit: The patient's condition can be safely assessed and treated via synchronous audio and visual telemedicine encounter.      Reason for Telemedicine Visit: Services only offered telehealth    Originating Site (Patient Location): Patient's home    Distant Site (Provider Location): Provider Remote Setting- Home Office    Consent:  The patient/guardian has verbally consented to: the potential risks and benefits of telemedicine (video visit) versus in person care; bill my insurance or make self-payment for services provided; and responsibility for payment of non-covered services.     Patient would like the video invitation sent by:  My Chart    Mode of Communication:  [facility]    Distant Location (Provider):  Off-site    As the provider I attest to compliance with applicable laws and regulations related to telemedicine.    Visit Activities (Refresh list every visit): [facility] Only    Diagnostic Assessment Date: 2022 by Bibiana Cochran PsyD, LP at [facility]  Treatment Plan Review Date: 2023  See Flowsheets for today's PHQ-9 and DIONNE-7 results  Previous PHQ-9:       3/29/2023    10:20 AM 2023    10:27 AM 2023    10:00 AM   PHQ-9 SCORE   PHQ-9 Total Score MyChart 4 (Minimal depression) 4 (Minimal depression)    PHQ-9 Total Score 4 4 3     Previous DIONNE-7:       2/15/2023     8:00 AM 3/22/2023    10:00 AM 2023    " "10:00 AM   DIONNE-7 SCORE   Total Score 2 6 5       CHIN LEVEL:       View : No data to display.                DATA  Extended Session (60+ minutes): No  Interactive Complexity: No  Crisis: No  [facility] Patient: No    Treatment Objective(s) Addressed in This Session:  Target Behavior(s): improve ability to function in relationships    Anxiety: will experience a reduction in anxiety    Current Stressors / Issues:   update: Pt reports that she had trouble getting her welbutrin filled but was able to get her PCP to refill it. Some ongoing memory issues which has been worse since January. She has been doing lots of work around learning how regulate (less reactive) and sometimes thinks she's too relaxed. The irritability is improved. [facility] reviewed her relationship with her brother which can be difficult. He's a \"sad sack in my life\". They are sharing looking after their aging parents who live in [place]. Using skills to manage stress, meditation, reducing her commitments.   Stresses: end of life with parents, father is having some anger issues related to age related memory loss  Appetite: unremarkable  Sleep: 6 hours per night  Therapy: Chaya CARRILLO at [facility], not seeing as often and doesn't currently have an appointment but has the phone number to scheduling   ROBERT: continues to reduce her use  Preg: NA  Goals/progress on goals: improved sleep and energy  Most important: med update    Progress on Treatment Objective(s) / Homework:  Satisfactory progress - ACTION (Actively working towards change); Intervened by reinforcing change plan / affirming steps taken    Motivational Interviewing    MI Intervention: Co-Developed Goal: improve relationships, Expressed Empathy/Understanding, Open-ended questions, Reflections: simple and complex, Change talk (evoked) and Reframe     Change Talk Expressed by the Patient: Desire to change Ability to change Reasons to change Need to change Committment to change Activation Taking " steps    Provider Response to Change Talk: E - Evoked more info from patient about behavior change, A - Affirmed patient's thoughts, decisions, or attempts at behavior change, R - Reflected patient's change talk and S - Summarized patient's change talk statements      Care Plan review completed: Yes    Medication Review:  No changes to current psychiatric medication(s)    Medication Compliance:  Yes    Changes in Health Issues:   None reported    Chemical Use Review:   Substance Use: Problem use continues with no change since last session, Stage of Change: Contemplation        Tobacco Use: No current tobacco use.      Assessment: Current Emotional / Mental Status (status of significant symptoms):  Risk status (Self / Other harm or suicidal ideation)  Patient denies a history of suicidal ideation, suicide attempts, self-injurious behavior, homicidal ideation, homicidal behavior and and other safety concerns  Patient denies current fears or concerns for personal safety.  Patient denies current or recent suicidal ideation or behaviors.  Patient denies current or recent homicidal ideation or behaviors.  Patient denies current or recent self injurious behavior or ideation.  Patient denies other safety concerns.  A safety and risk management plan has not been developed at this time, however patient was encouraged to call [place] [phone] should there be a change in any of these risk factors.    Appearance:   Appropriate   Eye Contact:   Good   Psychomotor Behavior: Normal   Attitude:   Cooperative   Orientation:   All  Speech   Rate / Production: Normal    Volume:  Normal   Mood:    Normal  Affect:    Appropriate   Thought Content:  Clear   Thought Form:  Coherent  Logical   Insight:    Good     Diagnoses:  1. Depression, major, recurrent, mild (H)    2. Generalized anxiety disorder    3. Attention deficit hyperactivity disorder (ADHD), combined type        Collateral Reports Completed:  Collaborated with [facility]  team    Plan: (Homework, other):  Patient was given information about behavioral services and encouraged to schedule a follow up appointment with the clinic [facility] in 1 month.  She was also given information about mental health symptoms and treatment options .  CD Recommendations: [facility] will continue to monitor use. Maris[facility] Chow[facility]         ______________________________________________________________________    Integrated Primary Care Behavioral Health Treatment Plan    Patient's Name: Vidhi Tovar  YOB: 1980    Date of Creation: 12/14/2022  Date Treatment Plan Last Reviewed/Revised: pending    DSM5 Diagnoses: 300.02 (F41.1) Generalized Anxiety Disorder  Psychosocial / Contextual Factors: relationship changes  PROMIS (reviewed every 90 days): 5/18/2023 (30)    Referral / Collaboration:  Referral to another professional/service is not indicated at this time..    Anticipated number of session for this episode of care: 10-12  Anticipation frequency of session: Monthly  Anticipated Duration of each session: 16-37 minutes  Treatment plan will be reviewed in 90 days or when goals have been changed.       MeasurableTreatment Goal(s) related to diagnosis / functional impairment(s)  Goal 1: Patient will experience improved anxiety    I will know I've met my goal when regular sleep, desire to do things, ability to work out, more mental energy.      Objective #A (Patient Action)    Patient will use at least 4 coping skills for anxiety management in the next 4 weeks.  Status: Continued - Date(s):9/14/2023     Intervention(s)  Therapist will teach emotional regulation skills. to manage anxiety.        Patient has reviewed and agreed to the above plan.      Marisabel Chowdary, [facility]  June 9, 2023

## 2023-06-09 ENCOUNTER — VIRTUAL VISIT (OUTPATIENT)
Dept: BEHAVIORAL HEALTH | Facility: CLINIC | Age: 43
End: 2023-06-09
Payer: COMMERCIAL

## 2023-06-09 ENCOUNTER — VIRTUAL VISIT (OUTPATIENT)
Dept: PSYCHIATRY | Facility: CLINIC | Age: 43
End: 2023-06-09
Payer: COMMERCIAL

## 2023-06-09 DIAGNOSIS — F33.1 MODERATE EPISODE OF RECURRENT MAJOR DEPRESSIVE DISORDER (H): ICD-10-CM

## 2023-06-09 DIAGNOSIS — F33.0 DEPRESSION, MAJOR, RECURRENT, MILD (H): Primary | ICD-10-CM

## 2023-06-09 DIAGNOSIS — F90.2 ATTENTION DEFICIT HYPERACTIVITY DISORDER (ADHD), COMBINED TYPE: ICD-10-CM

## 2023-06-09 DIAGNOSIS — F41.1 GENERALIZED ANXIETY DISORDER: ICD-10-CM

## 2023-06-09 DIAGNOSIS — F90.0 ADHD (ATTENTION DEFICIT HYPERACTIVITY DISORDER), INATTENTIVE TYPE: Primary | ICD-10-CM

## 2023-06-09 PROCEDURE — 99214 OFFICE O/P EST MOD 30 MIN: CPT | Mod: VID | Performed by: NURSE PRACTITIONER

## 2023-06-09 PROCEDURE — 99207 PR NO BILLABLE SERVICE THIS VISIT: CPT | Mod: VID | Performed by: SOCIAL WORKER

## 2023-06-09 NOTE — ASSESSMENT & PLAN NOTE
Psychiatrically stable at present. The current medical regimen is effective; continue present plan and medications.     Discussed stopping the bupropion for 8 days to evaluate if the memory decrease is related to the bupropion or separate.  She feels the bupropion has been robustly helpful and likely will continue this dose.    Will return to PCP for ongoing care, medication prescribing and future medication refills.  PCP recently refilled for 6 months.  Follow up with your PCP in about 3 months. Patient can be re-referred back to this service for further consultation in the future if needed but a new referral will need to be placed.

## 2023-06-09 NOTE — NURSING NOTE
Is the patient currently in the state of [place]? YES    Visit mode:VIDEO    If the visit is dropped, the patient can be reconnected by: VIDEO VISIT: Text to cell phone: [phone]    Will anyone else be joining the visit? NO      How would you like to obtain your AVS? MyChart    Are changes needed to the allergy or medication list? NO    Reason for visit: RECHECK        Care team has reviewed attendance agreement with patient. Patient advised that two failed appointments within 6 months may lead to termination of current episode of care.

## 2023-06-09 NOTE — PROGRESS NOTES
Virtual Visit Details    Type of service:  Video Visit   Video Start Time: 1050  Video End Time:11:04 AM     Originating Location (pt. Location): Home   Distant Location (provider location):  Off-site  Platform used for Video Visit: Vator          PSYCHIATRIC MEDICATION FOLLOW UP APPT     Name:  Vidhi Tovar  : 1980      Vidhi Tovar is a 42 year old female who is being evaluated via a billable video visit.      How would you like to obtain your AVS? MyChart  If the video visit is dropped, the invitation should be resent by: Text to cell phone: [phone]  Will anyone else be joining your video visit? No     [place] If not at [place] address below, please ask where they are in case of an emergency situation arises during the appointment.  [address]  [facility] [address]        Telemedicine Visit: The patient's condition can be safely assessed and treated via synchronous audio and visual telemedicine encounter.       Reason for Telemedicine Visit: COVID 19 pandemic and the social and physical recommendations by the CDC and MDH.       Originating Site (Patient Location): Patient's home     Distant Site (Provider Location): Provider Remote Setting     Consent:  The patient/guardian has verbally consented to: the potential risks and benefits of telemedicine (video visit or phone) versus in person care; bill my insurance or make self-payment for services provided; and responsibility for payment of non-covered services.      Mode of Communication:  Hopela platform      As the provider I attest to compliance with applicable laws and regulations related to telemedicine.     IDENTIFICATION   Referred by: Tracy Ryan NP [facility]      Patient Care Team:  Tracy Ryan NP as PCP - General (Primary Care - CC)  Bibiana Cochran, PhD LP as Assigned Behavioral Health Provider  Chaya Harmon MSW as Therapist  Tracy Ryan NP as Nurse Practitioner  (Primary Care - CC)  Therapist: raheel in individual therapy for the past 2 years      Patient attended the phone/video session alone.    Last seen for outpatient psychiatry Return Visit on 12/14/2022.      FOLLOWING PLAN PUT INTO PLACE: Start bupropion  mg targeting depression and executive functioning impairment (inability to focus, impaired organization and planning, reduced working memory). Using small amount of alprazolam for panic appropriately     Follow-up in 4 weeks      INTERIM HISTORY     COMMUNICATIONS FROM PATIENT VIA:  none    RECORDS AVAILABLE FOR REVIEW: EHR records through [employer]  and previous psychiatric progress note.  In addition, reviewed the assessment completed by Marisabel Chowdary [facility], dated today        Psychiatric testing completed within [facility]ran  Diagnosis include:  Major Depressive Disorder, Recurrent, Mild   Other Anxiety Disorder   Attention Deficit Hyperactivity Disorder   Alcohol Use Disorder  History of Stimulant Use      HISTORY OF PRESENT ILLNESS   CCPS referral for psychiatric medication consult in December 2022.  Reports history of depression, anxiety and newly formal diagnosis of ADHD through psychiatric testing within [facility].  Denies prior psychiatric hospitalizations. No history of suicidal thoughts or attempts. No history of self-injurious behaviors. No identified genetic load for psychiatric illnesses. Grew up in an intact home with all basic needs being met.     Reports she has typically stayed away from medications.  She was referred for psychiatric testing due to poor concentration and inattention.    Ms. Tovar indicated she has been e. She stated she has been prescribed Xanax to manage anxiety symptoms; diagnostic assessment has not been completed. She stated that her therapist recommended an evaluation to assess  baseline  functioning and she expressed concern regarding  impulse control.  She questioned if ADHD is present because she  zones  out  and has difficulty completing tasks. She highlighted she was given academic accommodations as a youth without a formal diagnosis. She acknowledged that symptoms impair her functioning at home and work as well as in social relationships.     Elementary school:  At age 8 teachers reached out to mother to have tested for ADHD but mother did not follow through.    [employer]: report card stated chatting a lot, academics was adequate but doesn't recall doing much homework  [employer] High: essentially the same as [employer]     Endorses often or very often experiencing the following:   -having trouble wrapping up the final details for a project, once the challenging parts have been done?  NO, because she doesn't get paid for work.  For other things in life, she was a procrastinator.  -Having difficulty getting things in order when they have to do a task that requires organization YES, SPECIFICALLY WHEN KEEPING HOUSE, NEEDS REMINDERS  -Having problems remembering appointments or obligations NO, USING THE CALENDAR  -Fidgets or squirms with hands or feet when you has to sit down for a long time PLAYING WITH HAIR, LOOKING OFF IN DIFFERENT DIRECTIONS  -When a task that requires a lot of thought, often avoid or delay getting started ALWAYS  -Feeling overly active and compelled to do things, as if driven by a motor IN SOME SITUATIONS   -Making careless mistakes when has to work on a boring or difficult project ALWAYS  -Having difficulty keeping attention when doing boring or repetitive work ALWAYS  -Having difficulty concentrating on what people say to them, even when they are speaking directly at them ALWAYS  -misplacing or have difficulty finding things at home or at work NO  -Distracted by activity or noise around you ALWAYS  -Leaving their seat in meetings or other situations in which there is an expection to remain seated NO  -Feeling restless or fidgety SOMETIMES  -Having difficulty unwinding and relaxing with time to  "themselves HISTORICALLY, HAS USED ALCOHOL TO UNWIND  -Talking too much in social situations ALWAYS  -Finding themselves finishing the sentences of the people they are talking to, before  they can finish them themselves ALWAYS, TRYING TO NOT DO THIS  -Having difficulty waiting your turn in situations when turn taking is required NO  - Interrupting others when they are busy NO         FAMILY, MEDICAL, SURGICAL HISTORY REVIEWED.  MEDICATION HAVE BEEN REVIEWED AND ARE CURRENT TO THE BEST OF MY KNOWLEDGE AND ABILITY.  Produce political ads, which varies in intensity      MEDICATIONS                                                                                                Current Outpatient Medications   Medication Sig     ALPRAZolam (XANAX) 0.25 MG tablet Take 1 tablet (0.25 mg) by mouth 3 times daily as needed for anxiety     buPROPion (WELLBUTRIN XL) 150 MG 24 hr tablet Take 1 tablet (150 mg) by mouth every morning     levonorgestrel (KYLEENA) 19.5 MG IUD 1 each by Intrauterine route once     No current facility-administered medications for this visit.      NOTES ABOUT CURRENT PSYCHOTROPIC MEDICATIONS:   Alprazolam 0.25 mg, getting small quantity and using appropriately  Bupropion  mg     PAST PSYCHOTROPIC MEDICATIONS:  none      TODAY PATIENT REPORTS THE FOLLOWING PSYCHIATRIC ROS:   Per [facility], Marisabel Chowdary, during today's team-based visit:  \"MH update: Pt reports that she had trouble getting her welbutrin filled but was able to get her PCP to refill it. Some ongoing memory issues which has been worse since January. She has been doing lots of work around learning how regulate (less reactive) and sometimes thinks she's too relaxed. The irritability is improved. [facility] reviewed her relationship with her brother which can be difficult. He's a \"sad sack in my life\". They are sharing looking after their aging parents who live in [place]. Using skills to manage stress, meditation, reducing her commitments.   Stresses: " "end of life with parents, father is having some anger issues related to age related memory loss  Appetite: unremarkable  Sleep: 6 hours per night  Therapy: Chaya CARRILLO at [facility], not seeing as often and doesn't currently have an appointment but has the phone number to scheduling   ROBERT: continues to reduce her use  Preg: NA  Goals/progress on goals: improved sleep and energy  Most important: med update\"     EXERCISE: Adequate  SIDE EFFECTS:   Concern the bupropion has caused decrease in memory recall.    COMPLIANCE:   states Adherent to medication regimen  REPORTS THE FOLLOWING NEW MEDICAL ISSUES:   None    PROBLEM: ADHD: Improving          PROBLEM: DEPRESSION: Feels the current medication regimen is effective.         5/18/2023    10:00 AM   Last PHQ-9   1.  Little interest or pleasure in doing things 0   2.  Feeling down, depressed, or hopeless 0   3.  Trouble falling or staying asleep, or sleeping too much 0   4.  Feeling tired or having little energy 1   5.  Poor appetite or overeating 1   6.  Feeling bad about yourself 1   7.  Trouble concentrating 0   8.  Moving slowly or restless 0   Q9: Thoughts of better off dead/self-harm past 2 weeks 0   PHQ-9 Total Score 3   Difficulty at work, home, or with people Somewhat difficult         3/29/2023    10:20 AM 4/5/2023    10:27 AM 5/18/2023    10:00 AM   PHQ-9 SCORE   PHQ-9 Total Score MyChart 4 (Minimal depression) 4 (Minimal depression)    PHQ-9 Total Score 4 4 3     PHQ9 score is 6 indicating mild depression.     Suicidal ideation:  No        PERTINENT PAST MEDICAL AND SURGICAL HISTORY     Past Medical History:   Diagnosis Date     ADHD (attention deficit hyperactivity disorder)      Depressive disorder      Fear of flying      Herpes simplex virus infection      Rosacea      Vitamin D deficiency      Vitiligo        VITALS     BP Readings from Last 1 Encounters:   01/27/23 126/81     Pulse Readings from Last 1 Encounters:   01/27/23 69     Wt " "Readings from Last 1 Encounters:   01/27/23 82.1 kg (181 lb)     Ht Readings from Last 1 Encounters:   01/27/23 1.699 m (5' 6.9\")     Estimated body mass index is 28.43 kg/m  as calculated from the following:    Height as of 1/27/23: 1.699 m (5' 6.9\").    Weight as of 1/27/23: 82.1 kg (181 lb).    LABS & IMAGING                                                                                                                   Recent Labs   Lab Test 06/21/22  0807   HGB 13.0     Recent Labs   Lab Test 06/21/22  0807   *   POTASSIUM 4.1   CHLORIDE 103   CO2 19*   GLC 81   DOC 8.9   BUN 7.3   CR 0.81   GFRESTIMATED >90     Recent Labs   Lab Test 06/21/22  0807   CHOL 199   *   HDL 41*   TRIG 268*   A1C 5.6     Recent Labs   Lab Test 06/21/22  0807   TSH 3.23        ALLERGY & IMMUNIZATIONS     No Known Allergies    MEDICAL REVIEW OF SYSTEMS:   Ten system review was completed with pertinent positives noted     MENTAL STATUS EXAM:      General/Constitutional:  Appearance:   awake, alert, adequately groomed, appeared stated age and no apparent distress  Attitude:    cooperative   Eye Contact:  good  Musculoskeletal:  Psychomotor Behavior:  no evidence of tardive dyskinesia, dystonia, or tics from the head up  Psychiatric:  Speech:  clear, coherent, regular rate, rhythm, and volume,   No pressure speech noted.  Associations:  no loose associations  Thought Process:   logical, linear and goal oriented  Thought Content:    No evidence of suicidal ideation or homicidal ideation, no evidence of psychotic thought, no auditory hallucinations present and no visual hallucinations present  Mood:  good   Affect:  full range/stable (normal variation of emotions during exam) and was congruent to speech content.  Insight:  good  Judgment:  intact, adequate for safety  Impulse Control:  intact  Neurological:  Oriented to:  person, place, time, and situation  Attention Span and Concentration:  Able to attend to the interview    "   Language: intact     Recent and Remote Memory:  Intact to interview. Not formally assessed. No amnesia.    Fund of Knowledge: appropriate       SAFETY   Feels safe in home: YES     Suicidal ideation: Denies  History of suicide attempts:  No   Hx of impulsivity: No   Hope for the future: present    Hx of Command hallucinations or current psychosis: None endorsed    History of Self-injurious behaviors: denies   Family member  by suicide:  not discussed       SAFETY ASSESSMENT:   Based on all available evidence including the factors cited above, overall Risk for harm is low and is appropriate for outpatient level of care.   Recommended that patient call 911 or go to the local ED should there be a change in any of these risk factors      DSM 5 DIAGNOSIS:   Attention-Deficit/Hyperactivity Disorder  314.00 (F90.0) Predominantly inattentive presentation per formal psych testing  296.31 (F33.0) Major Depressive Disorder, Recurrent Episode, Mild _  300.09 (F41.8) Other Specified Anxiety Disorder         MEDICAL COMORBIDITY IMPACTING CLINICAL PICTURE:  None noted.       ASSESSMENT AND PLAN       Problem List as of 2023 Reviewed: 2023 11:00 AM by Venice Alejandra DNP          Noted       Behavioral    Last System Assessment & Plan 2023 Virtual Visit Edited 2023 11:01 AM by Venice Alejandra DNP     Psychiatrically stable at present. The current medical regimen is effective; continue present plan and medications.     Discussed stopping the bupropion for 8 days to evaluate if the memory decrease is related to the bupropion or separate.  She feels the bupropion has been robustly helpful and likely will continue this dose.    Will return to PCP for ongoing care, medication prescribing and future medication refills.  PCP recently refilled for 6 months.  Follow up with your PCP in about 3 months. Patient can be re-referred back to this service for further consultation in the future if needed but a  new referral will need to be placed.               1. ADHD (attention deficit hyperactivity disorder), inattentive type - Primary 12/14/2022    2. Moderate episode of recurrent major depressive disorder (H) 6/9/2023        CONSULTS/REFERRALS:   Continue therapy   Coordinate care with therapist as needed     MEDICAL:   None at this time  Coordinate care with PCP (Tracy Ryan) as needed  Follow up with primary care provider as planned or for acute medical concerns.     PSYCHOEDUCATION:  Medication side effects and alternatives reviewed. Health promotion activities recommended and reviewed today. All questions addressed. Education and counseling completed regarding risks and benefits of medications and psychotherapy options.  Consent provided by patient/guardian  Call the psychiatric nurse line with medication questions or concerns at [phone].  Seamlesshart may be used to communicate with your provider, but this is not intended to be used for emergencies.  BEERS CRITERIA: The American Geriatrics Society (AGS) released its second updated and expanded Beers Criteria - lists of potentially inappropriate medications for older adults - and one of the most frequently cited reference tools in the field of geriatrics. The Society also unveiled a suite of new  resources - including a list of alternative therapies for potentially inappropriate medications and more detailed guidance on best practices for implementing AGS recommendations.  Discussed with client.   Medlineplus.gov is information for patients.  It is run by the National Library of Medicine and it contains information about all disorders, diseases and all medications.       COMMUNITY RESOURCES:    CRISIS NUMBERS: Provided in AVS 12/14/2022  National Suicide Prevention Lifeline: 4-789-389-TALK (515-927-1592)  [employer]/resources for a list of additional resources (SOS)            [facility] - [phone]   Urgent Care Adult Mental  Mazhtq-[phone] mobile unit/ 24/7 crisis line  [facility] -[phone]   COPE 24/7 [facility] Mobile Team -[phone] (adults)/ [phone] (child)  Poison Control Center - 1-408.930.2601    OR  go to nearest ER  Crisis Text Line for any crisis 24/7 send this-   To: 070557   [facility] ([facility]) [facility]  [phone]  National Suicide Prevention Lifeline: 935.190.3650 (TTY: 298.110.8524). Call anytime for help.  (www.suicidepreventionlifeline.org)  National [place] on Mental Illness (www.jenn.org): 981.449.6161 or 511-504-9057.   Mental Health Association (www.mentalhealth.org): 442.795.1138 or 167-603-9653.  [place] Crisis Text Line: Text [place] to 247752  Suicide LifeLine Chat: suicidePatron Technology.org/chat      ADMINISTRATIVE BILLING:     Level of Medical Decision Making:   - At least 2 stable chronic problems  - Engaged in prescription drug management during visit (discussed any medication benefits, side effects, alternatives, etc.)           Patient Status:  Patient has been stabilized and will be returned to PCP for ongoing care, medication prescribing and future medication refills.        Signed:   Venice Alejandra, MSN, APRN, FMHNP-[facility] Collaborative Care Psychiatry Service (CCPS)   Chart documentation done in part with Dragon Voice Recognition software.  Although reviewed after completion, some word and grammatical errors may remain.

## 2023-06-09 NOTE — PATIENT INSTRUCTIONS
**For crisis resources, please see the information at the end of this document**     [facility] MENTAL HEALTH & ADDICTION [facility].      Thank you for our work together in the Psychiatry Collaborative Care Model at [facility]. This is our last visit and I am returning your care back to your Primary Care Provider Melanie Bruno PA-C . If you are not doing well, please contact your Primary Care Provider office.    MEDICATIONS:   - The current medical regimen is effective; continue present plan and medications.     MyChart Assistance [phone]  Financial Assistance [phone]  [facility] Billing [phone]  Central Billing Office, [facility]: [phone]  [place] Billing [phone]  Medical Records [phone]  [place] Patient Bill of Rights [email or web address]       MENTAL HEALTH CRISIS RESOURCES:  For a emergency help, please call 911 or go to the nearest Emergency Department.     Emergency Walk-In Options:   EmPATH Unit @ [facility] ([place]): [phone] - Specialized mental health emergency area designed to be calming  [facility] ([place]): [phone]  [facility] ([place]): [phone]  [facility]): [phone]    National Crisis Information: Call 988 Suicide and Crisis Lifeline  Crisis Text Line (free 24/7):  call **CRISIS (**944891) Crisis or use the texting option by texting 923537.   National Suicide Prevention Lifeline: Call 988  Poison Control Center: 1-349-221-3820  Trans Lifeline: 4-600-836-0084 - Hotline for transgender people of all ages  The Bhanu Project: 3-225-508-4010 - Hotline for LGBT youth   List of all [facility] resources:   https://[place].gov/dhs/people-we-serve/adults/health-care/mental-health/resources/crisis-contacts.jsp    For Non-Emergency Support:   Fast Tracker: Mental Health & Substance Use Disorder  Resources -   https://www.[employer].org/       Again thank you for choosing [facility] MENTAL HEALTH & ADDICTION [facility] and please let us know how we can best partner with you to improve you and your family's health.    You may be receiving a survey regarding this appointment. We would love to have your feedback, both positive and negative. The survey is done by an external company, so your answers are anonymous.

## 2023-06-13 ENCOUNTER — TELEPHONE (OUTPATIENT)
Dept: FAMILY MEDICINE | Facility: CLINIC | Age: 43
End: 2023-06-13

## 2023-06-13 ENCOUNTER — ANCILLARY PROCEDURE (OUTPATIENT)
Dept: MAMMOGRAPHY | Facility: CLINIC | Age: 43
End: 2023-06-13
Attending: NURSE PRACTITIONER
Payer: COMMERCIAL

## 2023-06-13 DIAGNOSIS — Z00.00 ROUTINE HISTORY AND PHYSICAL EXAMINATION OF ADULT: ICD-10-CM

## 2023-06-13 PROCEDURE — 77067 SCR MAMMO BI INCL CAD: CPT

## 2023-06-13 PROCEDURE — 77067 SCR MAMMO BI INCL CAD: CPT | Mod: 26

## 2023-06-13 NOTE — TELEPHONE ENCOUNTER
Jasmin [place] [facility]   Phone number: [phone]  If we get any CD from [place] [facility] send it in the mail to the breast center at [facility] address is [address], [place], 2nd floor breast center.   Magda MANCIA EMT 11:42 AM 6/13/2023

## 2023-06-14 ENCOUNTER — OFFICE VISIT (OUTPATIENT)
Dept: FAMILY MEDICINE | Facility: CLINIC | Age: 43
End: 2023-06-14
Payer: COMMERCIAL

## 2023-06-14 VITALS
BODY MASS INDEX: 28.67 KG/M2 | DIASTOLIC BLOOD PRESSURE: 71 MMHG | SYSTOLIC BLOOD PRESSURE: 109 MMHG | WEIGHT: 182.7 LBS | HEART RATE: 72 BPM | TEMPERATURE: 98.3 F | HEIGHT: 67 IN | OXYGEN SATURATION: 98 %

## 2023-06-14 DIAGNOSIS — Z30.432 ENCOUNTER FOR IUD REMOVAL: Primary | ICD-10-CM

## 2023-06-14 NOTE — NURSING NOTE
"ROOM:3  GISELE CHAN    Preferred Name: Mindy     How did you hear about us?  Current Patient    43 year old  Chief Complaint   Patient presents with     IUD     removal       Blood pressure 109/71, pulse 72, temperature 98.3  F (36.8  C), temperature source Oral, height 1.709 m (5' 7.3\"), weight 82.9 kg (182 lb 11.2 oz), SpO2 98 %. Body mass index is 28.36 kg/m .  BP completed using cuff size:        Patient Active Problem List   Diagnosis     ADHD (attention deficit hyperactivity disorder), inattentive type     Depression, unspecified depression type     Moderate episode of recurrent major depressive disorder (H)       Wt Readings from Last 2 Encounters:   06/14/23 82.9 kg (182 lb 11.2 oz)   01/27/23 82.1 kg (181 lb)     BP Readings from Last 3 Encounters:   06/14/23 109/71   01/27/23 126/81   12/07/22 138/77       No Known Allergies    Current Outpatient Medications   Medication     ALPRAZolam (XANAX) 0.25 MG tablet     buPROPion (WELLBUTRIN XL) 150 MG 24 hr tablet     levonorgestrel (KYLEENA) 19.5 MG IUD     valACYclovir HCl (VALTREX PO)     No current facility-administered medications for this visit.       Social History     Tobacco Use     Smoking status: Former     Smokeless tobacco: Never   Vaping Use     Vaping status: Never Used   Substance Use Topics     Alcohol use: Not Currently     Comment: former     Drug use: Yes     Types: Marijuana, Psilocybin     Comment: occasional use       Honoring Choices - Health Care Directive Guide offered to patient at time of visit.    Health Maintenance Due   Topic Date Due     ADVANCE CARE PLANNING  Never done     DEPRESSION ACTION PLAN  Never done     HEPATITIS B IMMUNIZATION (2 of 3 - Hep B Twinrix 3-dose series) 11/15/2014     YEARLY PREVENTIVE VISIT  06/13/2023       Immunization History   Administered Date(s) Administered     COVID-19 Bivalent 18+ (Moderna) 11/14/2022     COVID-19 Monovalent 12+ (Pfizer 2022) 04/08/2022     Influenza Vaccine >6 months " (Alfuria,Fluzone) 10/05/2022     Influenza Vaccine, 6+MO IM (QUADRIVALENT W/PRESERVATIVES) 11/01/2019, 10/13/2020     TDAP Vaccine (Boostrix) 06/17/2019       No results found for: PAP    Recent Labs   Lab Test 06/21/22  0807 05/19/21  0000   A1C 5.6  --    *  --    HDL 41*  --    TRIG 268* 196*   CR 0.81  --    GFRESTIMATED >90  --    POTASSIUM 4.1  --    TSH 3.23  --            12/14/2022     8:07 AM 12/7/2022     7:55 AM   PHQ-2 ( 1999 Pfizer)   Q1: Little interest or pleasure in doing things 1 1   Q2: Feeling down, depressed or hopeless 1 1   PHQ-2 Score 2 2   Q1: Little interest or pleasure in doing things Several days    Q2: Feeling down, depressed or hopeless Several days    PHQ-2 Score 2            3/22/2023    10:18 AM 3/29/2023    10:20 AM 4/5/2023    10:27 AM 5/18/2023    10:00 AM   PHQ-9 SCORE   PHQ-9 Total Score MyChart 6 (Mild depression) 4 (Minimal depression) 4 (Minimal depression)    PHQ-9 Total Score 6 4 4 3           2/15/2023     8:00 AM 3/22/2023    10:00 AM 5/18/2023    10:00 AM   DIONNE-7 SCORE   Total Score 2 6 5            View : No data to display.                Renetta Mena    June 14, 2023 9:17 AM

## 2023-06-14 NOTE — PROGRESS NOTES
"Assessment & Plan   Vidhi was seen today for iud.    Diagnoses and all orders for this visit:    Encounter for IUD removal  - Consent form signed. IUD strings visualized and grasped with ring forceps. IUD removed easily with slight tension of the forceps. No complications. Patient tolerated procedure well.        Tracy HUGHES [place], NP   ______________________________________    Subjective   Mindy is a 43 year old patient here today for IUD (removal)    Not sexually active.   Kyleena inserted 3 years ago, does not know date  Desires removal of IUD    Do you need any refills on your Medications today? No    Medical, surgical, family and social histories reviewed and updated as indicated.   Medications and allergies reviewed and updated as indicated.       ROS  Review Of Systems  See HPI    General Physical Exam:  Vitals: /71   Pulse 72   Temp 98.3  F (36.8  C) (Oral)   Ht 1.709 m (5' 7.3\")   Wt 82.9 kg (182 lb 11.2 oz)   SpO2 98%   BMI 28.36 kg/m    Physical Exam  Vitals and nursing note reviewed.   Constitutional:       General: She is not in acute distress.     Appearance: Normal appearance. She is not ill-appearing.   HENT:      Head: Normocephalic and atraumatic.   Eyes:      General: Lids are normal.      Conjunctiva/sclera: Conjunctivae normal.   Pulmonary:      Effort: Pulmonary effort is normal.   Genitourinary:     Vagina: Normal.      Cervix: Normal.      Comments: IUD strings visualized.   Skin:     Comments: Skin intact with no visible lesions.   Neurological:      Mental Status: She is alert.      Gait: Gait is intact.       "

## 2023-06-23 DIAGNOSIS — F41.9 ANXIETY: ICD-10-CM

## 2023-06-23 RX ORDER — ALPRAZOLAM 0.25 MG
0.25 TABLET ORAL 3 TIMES DAILY PRN
Qty: 10 TABLET | Refills: 0 | Status: SHIPPED | OUTPATIENT
Start: 2023-06-23 | End: 2023-11-08

## 2023-06-23 NOTE — TELEPHONE ENCOUNTER
"Reviewed PDMP. Patient has remained on a stable mental health regimen for >6 months. Recently \"graduated\" from psychiatry management at [facility]. Xanax use has been consistent (10 tablets every 2 months) for the last year. Will provide refill.     Tracy Ryan NP   "

## 2023-06-23 NOTE — TELEPHONE ENCOUNTER
[facility] phone call message - patient requesting a refill:    Full Medication Name: Alprazolam (xanax)    Dose: 0.25 mg tablet 3 times daily PRN     Pharmacy confirmed as   [id number] - [facility] [address] [facility]  [address] [facility] [address]  Phone: [phone] Fax: [phone]  : Yes    Medication tab checked to see if medication has been sent  Yes    Is patient out of medication: Yes    Did patient contact the pharmacy? Yes    Additional Comments: patient called, is not back in town until Monday      Primary language: English      needed? No    Call taken on June 23, 2023 at 9:19 AM by Magda Padilla, EMT    Route to [facility] MED REFILLS TEAM     ++If medication is a controlled substance, please route directly to the provider++

## 2023-07-27 ENCOUNTER — TELEPHONE (OUTPATIENT)
Dept: FAMILY MEDICINE | Facility: CLINIC | Age: 43
End: 2023-07-27

## 2023-07-27 NOTE — TELEPHONE ENCOUNTER
[facility] phone call message - patient requesting a refill:    Full Medication Name: valACYclovir HCl (VALTREX PO) 1 gram tab      Dose: Per Patient, her bottle says 1 gram     Pharmacy confirmed as   [facility] # [id number] - [place], [place] - [address] [facility]  [address] [facility] [address]  Phone: [phone] Fax: [phone]  : Yes    Medication tab checked to see if medication has been sent  Yes    Is patient out of medication: Yes    Did patient contact the pharmacy? No: Last refill was from [place]. It's also patient reported in the chart.     Additional Comments: N/A     Primary language: English      needed? No    Call taken on July 27, 2023 at 2:25 PM by Abigail Edmondson CMA        ++If medication is a controlled substance, please route directly to the provider++

## 2023-08-07 ENCOUNTER — OFFICE VISIT (OUTPATIENT)
Dept: DERMATOLOGY | Facility: CLINIC | Age: 43
End: 2023-08-07
Payer: COMMERCIAL

## 2023-08-07 DIAGNOSIS — D18.01 CHERRY ANGIOMA: ICD-10-CM

## 2023-08-07 DIAGNOSIS — L82.1 SEBORRHEIC KERATOSIS: ICD-10-CM

## 2023-08-07 DIAGNOSIS — D22.9 MULTIPLE MELANOCYTIC NEVI: ICD-10-CM

## 2023-08-07 DIAGNOSIS — B07.9 VERRUCA VULGARIS: ICD-10-CM

## 2023-08-07 DIAGNOSIS — D22.39 FIBROUS PAPULE OF NOSE: ICD-10-CM

## 2023-08-07 DIAGNOSIS — L71.9 ROSACEA: ICD-10-CM

## 2023-08-07 DIAGNOSIS — L80 VITILIGO: Primary | ICD-10-CM

## 2023-08-07 DIAGNOSIS — L81.4 SOLAR LENTIGO: ICD-10-CM

## 2023-08-07 PROCEDURE — 99203 OFFICE O/P NEW LOW 30 MIN: CPT | Mod: 25 | Performed by: DERMATOLOGY

## 2023-08-07 PROCEDURE — 17110 DESTRUCTION B9 LES UP TO 14: CPT | Mod: GC | Performed by: DERMATOLOGY

## 2023-08-07 RX ORDER — TACROLIMUS 1 MG/G
OINTMENT TOPICAL 2 TIMES DAILY
Qty: 100 G | Refills: 5 | Status: SHIPPED | OUTPATIENT
Start: 2023-08-07 | End: 2023-11-08

## 2023-08-07 ASSESSMENT — PAIN SCALES - GENERAL: PAINLEVEL: NO PAIN (0)

## 2023-08-07 NOTE — LETTER
8/7/2023       RE: Vidhi Tovar  [address]  [facility] [address]     Dear Colleague,    Thank you for referring your patient, Vidhi Tovar, to the [facility]ERMAT[facility] [place] at [facility]. Please see a copy of my visit note below.    [facility] Dermatology Note  Encounter Date: Aug 7, 2023  Office Visit     Dermatology Problem List:  FBSE 8/7/23  1. Vitiligo - acral, flexural creases/skin folds, periorbital  - Current tx: tacrolimus 0.1% ointment BID  - Previous tx: topical steroids  - Future considerations: topical ruxolitinib, nbUVB  - TSH 6/2022 wnl  2. Rosacea, erythematotelangiectatic  3. Fibrous papule, nasal tip  4. Verruca vulgaris, R 2nd digit - s/p cryo 8/7/23  ____________________________________________    Assessment & Plan:     Vitiligo - acral, flexural creases/skin folds, periorbital  Recommend starting with tacrolimus 0.1% ointment, applying twice daily to the affected areas of depigmentation. Recommend diligent sunscreen use to these areas as well. Can consider topical ruxolitinib versus nbUVB in the future if symptoms are not improving. Patient is agreeable with plan.    2. Rosacea, erythematotelangiectatic  Discussed possible topical agents (e.g. brimonidine) versus PDL to treat symptoms, risks/benefits discussed. Patient interested in observation at this time.     3. Verruca vulgaris, R 2nd digit  Exam today looks clinically most consistent with wart versus foreign body. Recommend treatment with cryotherapy today - patient agreeable following risks/benefits discussion.     4. Benign skin findings - fibrous papule (nasal tip), banal-appearing melanocytic nevi, solar lentigines, cherry angiomas, seborrheic keratoses.  Reassured patient of benign skin findings. There is no need for further treatment. Discussed signs/symptoms concerning for NMSC and melanoma.   - ABCDEs: Counseled ABCDEs of  melanoma: Asymmetry, Border (irregularity), Color (not uniform, changes in color), Diameter (greater than 6 mm which is about the size of a pencil eraser), and Evolving (any changes in preexisting moles).  - Sun protection: Counseled SPF30+ sunscreen, UPF clothing, sun avoidance, tanning bed avoidance.     Procedures Performed:   - Cryotherapy procedure note, location(s): R 2nd digit. After verbal consent and discussion of risks and benefits including, but not limited to, dyspigmentation/scar, blister, and pain, 1 lesion(s) was(were) treated with 1-2 mm freeze border for 1-2 cycles with liquid nitrogen. Post cryotherapy instructions were provided.    Follow-up: 6 month(s) in-person, or earlier for new or changing lesions    Staff and Resident:     Christin Thornton MD  PGY3 Dermatology Resident    Staff Physician Comments:   I saw and evaluated the patient with the resident and I edited the assessment and plan as documented in the note. I was present for the entire minor procedure and examination.    Clive Wilks MD   of Dermatology  Department of Dermatology  [facility]      ____________________________________________    CC: Skin Check (Mindy is here today for a skin check and is concerned about a lesion on the nose, back of the left calf and more )    HPI:  Ms. Vidhi Tovar is a(n) 43 year old female who presents today as a new patient for a FBSE.    - Mindy reports having two spots (one on the tip of her nose, present for 1 year, and one spot on the posterior left calf, present for 2-3 years) of concern today. Both spots have expressed purulent drainage. The spot on the calf has resolved. The spot on the tip of the nose has improved, but not totally gone away.   - Additionally, she reports having a prickly pear cactus (foreign body), present for the past 8-12 months, that is intermittently tender on her R 2nd digit.  - She would also like to discuss  treatments for rosacea and vitiligo, to see if new/emerging treatments are available. Neither are being treated currently. For vitiligo in 2010, has treated skin with topical steroids, with some improvement in repigmentation noted.   - Affected areas of vitiligo include hands, feet, legs, skin folds, and periorbital skin. She has had vitiligo since ever since she was 16 years old.   - Rosacea is longstanding, primarily with erythematous patches noted on cheeks/chin. She reports rosacea flaring with consuming sweetened drinks and/or alcohol (after prolonged time of not eating).  - Patient denies having a personal or familial history of skin cancer. She reports having a couple of blistering sunburns as a child. Denies having a history of tanning bed use. Has been using sunscreen routinely when outside.    Patient is otherwise feeling well, without additional skin concerns.    Labs Reviewed:  N/A    Physical Exam:  Vitals: There were no vitals taken for this visit.  SKIN: Total skin excluding the undergarment areas was performed. The exam included the head/face, neck, both arms, chest, back, abdomen, both legs, digits and/or nails.   - On the nasal tip, there is a 2 mm pink papule which blanches with direct pressure.  - Erythematous pink patches on bilateral cheeks and chin, with prominent telangiectasias.   - There are well demarcated depigmented patches scattered throughout the bilateral hands, feet, legs, axillary vault, inguinal folds, antecubital fossa, inframammary folds, flank, chest, and periorbital skin.  - There is a small area of the lateral tip of the finger with interruption of dermatoglyphics on R 2nd digit.   - There are dome shaped bright red papules on the trunk and extremities.   - Multiple regular brown pigmented macules and papules are identified on the trunk and extremities.   - Scattered brown macules on sun exposed areas.  - There are waxy stuck on tan to brown papules on the trunk and  extremities.   - No other lesions of concern on areas examined.     Medications:  Current Outpatient Medications   Medication    ALPRAZolam (XANAX) 0.25 MG tablet    buPROPion (WELLBUTRIN XL) 150 MG 24 hr tablet    valACYclovir HCl (VALTREX PO)     No current facility-administered medications for this visit.      Past Medical History:   Patient Active Problem List   Diagnosis    ADHD (attention deficit hyperactivity disorder), inattentive type    Depression, unspecified depression type    Moderate episode of recurrent major depressive disorder (H)     Past Medical History:   Diagnosis Date    ADHD (attention deficit hyperactivity disorder)     Depressive disorder     Fear of flying     Herpes simplex virus infection     Rosacea     Vitamin D deficiency     Vitiligo        CC Tracy Ryan NP  [facility]  [address] [place] [phone] on close of this encounter.

## 2023-08-07 NOTE — PATIENT INSTRUCTIONS
Cryotherapy    What is it?  Use of a very cold liquid, such as liquid nitrogen, to freeze and destroy abnormal skin cells that need to be removed    What should I expect?  Tenderness and redness  A small blister that might grow and fill with dark purple blood. There may be crusting.  More than one treatment may be needed if the lesions do not go away.    How do I care for the treated area?  Gently wash the area with your hands when bathing.  Use a thin layer of Vaseline to help with healing. You may use a Band-Aid.   The area should heal within 7-10 days and may leave behind a pink or lighter color.   Do not use an antibiotic or Neosporin ointment.   You may take acetaminophen (Tylenol) for pain.     Call your doctor if you have:  Severe pain  Signs of infection (warmth, redness, cloudy yellow drainage, and or a bad smell)  Questions or concerns    Who should I call with questions?      [facility]: [phone]      [facility]: [phone]      For urgent needs outside of business hours call the [facility] at [phone] and ask for the dermatology resident on call

## 2023-08-07 NOTE — PROGRESS NOTES
[facility] Dermatology Note  Encounter Date: Aug 7, 2023  Office Visit     Dermatology Problem List:  FBSE 8/7/23  1. Vitiligo - acral, flexural creases/skin folds, periorbital  - Current tx: tacrolimus 0.1% ointment BID  - Previous tx: topical steroids  - Future considerations: topical ruxolitinib, nbUVB  - TSH 6/2022 wnl  2. Rosacea, erythematotelangiectatic  3. Fibrous papule, nasal tip  4. Verruca vulgaris, R 2nd digit - s/p cryo 8/7/23  ____________________________________________    Assessment & Plan:     Vitiligo - acral, flexural creases/skin folds, periorbital  Recommend starting with tacrolimus 0.1% ointment, applying twice daily to the affected areas of depigmentation. Recommend diligent sunscreen use to these areas as well. Can consider topical ruxolitinib versus nbUVB in the future if symptoms are not improving. Patient is agreeable with plan.    2. Rosacea, erythematotelangiectatic  Discussed possible topical agents (e.g. brimonidine) versus PDL to treat symptoms, risks/benefits discussed. Patient interested in observation at this time.     3. Verruca vulgaris, R 2nd digit  Exam today looks clinically most consistent with wart versus foreign body. Recommend treatment with cryotherapy today - patient agreeable following risks/benefits discussion.     4. Benign skin findings - fibrous papule (nasal tip), banal-appearing melanocytic nevi, solar lentigines, cherry angiomas, seborrheic keratoses.  Reassured patient of benign skin findings. There is no need for further treatment. Discussed signs/symptoms concerning for NMSC and melanoma.   - ABCDEs: Counseled ABCDEs of melanoma: Asymmetry, Border (irregularity), Color (not uniform, changes in color), Diameter (greater than 6 mm which is about the size of a pencil eraser), and Evolving (any changes in preexisting moles).  - Sun protection: Counseled SPF30+ sunscreen, UPF clothing, sun avoidance, tanning bed avoidance.     Procedures  Performed:   - Cryotherapy procedure note, location(s): R 2nd digit. After verbal consent and discussion of risks and benefits including, but not limited to, dyspigmentation/scar, blister, and pain, 1 lesion(s) was(were) treated with 1-2 mm freeze border for 1-2 cycles with liquid nitrogen. Post cryotherapy instructions were provided.    Follow-up: 6 month(s) in-person, or earlier for new or changing lesions    Staff and Resident:     Christin Thornton MD  PGY3 Dermatology Resident    Staff Physician Comments:   I saw and evaluated the patient with the resident and I edited the assessment and plan as documented in the note. I was present for the entire minor procedure and examination.    Clive Wilks MD   of Dermatology  Department of Dermatology  [facility]      ____________________________________________    CC: Skin Check (Mindy is here today for a skin check and is concerned about a lesion on the nose, back of the left calf and more )    HPI:  Ms. Vidhi Tovar is a(n) 43 year old female who presents today as a new patient for a FBSE.    - Mindy reports having two spots (one on the tip of her nose, present for 1 year, and one spot on the posterior left calf, present for 2-3 years) of concern today. Both spots have expressed purulent drainage. The spot on the calf has resolved. The spot on the tip of the nose has improved, but not totally gone away.   - Additionally, she reports having a prickly pear cactus (foreign body), present for the past 8-12 months, that is intermittently tender on her R 2nd digit.  - She would also like to discuss treatments for rosacea and vitiligo, to see if new/emerging treatments are available. Neither are being treated currently. For vitiligo in 2010, has treated skin with topical steroids, with some improvement in repigmentation noted.   - Affected areas of vitiligo include hands, feet, legs, skin folds, and periorbital skin.  She has had vitiligo since ever since she was 16 years old.   - Rosacea is longstanding, primarily with erythematous patches noted on cheeks/chin. She reports rosacea flaring with consuming sweetened drinks and/or alcohol (after prolonged time of not eating).  - Patient denies having a personal or familial history of skin cancer. She reports having a couple of blistering sunburns as a child. Denies having a history of tanning bed use. Has been using sunscreen routinely when outside.    Patient is otherwise feeling well, without additional skin concerns.    Labs Reviewed:  N/A    Physical Exam:  Vitals: There were no vitals taken for this visit.  SKIN: Total skin excluding the undergarment areas was performed. The exam included the head/face, neck, both arms, chest, back, abdomen, both legs, digits and/or nails.   - On the nasal tip, there is a 2 mm pink papule which blanches with direct pressure.  - Erythematous pink patches on bilateral cheeks and chin, with prominent telangiectasias.   - There are well demarcated depigmented patches scattered throughout the bilateral hands, feet, legs, axillary vault, inguinal folds, antecubital fossa, inframammary folds, flank, chest, and periorbital skin.  - There is a small area of the lateral tip of the finger with interruption of dermatoglyphics on R 2nd digit.   - There are dome shaped bright red papules on the trunk and extremities.   - Multiple regular brown pigmented macules and papules are identified on the trunk and extremities.   - Scattered brown macules on sun exposed areas.  - There are waxy stuck on tan to brown papules on the trunk and extremities.   - No other lesions of concern on areas examined.     Medications:  Current Outpatient Medications   Medication    ALPRAZolam (XANAX) 0.25 MG tablet    buPROPion (WELLBUTRIN XL) 150 MG 24 hr tablet    valACYclovir HCl (VALTREX PO)     No current facility-administered medications for this visit.      Past Medical  History:   Patient Active Problem List   Diagnosis    ADHD (attention deficit hyperactivity disorder), inattentive type    Depression, unspecified depression type    Moderate episode of recurrent major depressive disorder (H)     Past Medical History:   Diagnosis Date    ADHD (attention deficit hyperactivity disorder)     Depressive disorder     Fear of flying     Herpes simplex virus infection     Rosacea     Vitamin D deficiency     Vitiligo        CC Tracy Ryan NP  [facility]  [address] [place] [phone] on close of this encounter.

## 2023-08-07 NOTE — NURSING NOTE
Dermatology Rooming Note    Vidhi Tovar's goals for this visit include:   Chief Complaint   Patient presents with    Skin Check     Mindy is here today for a skin check and is concerned about a lesion on the nose, back of the left calf and more      Lisandra SEsperanza, CMA

## 2023-08-09 ENCOUNTER — VIRTUAL VISIT (OUTPATIENT)
Dept: PSYCHOLOGY | Facility: CLINIC | Age: 43
End: 2023-08-09
Payer: COMMERCIAL

## 2023-08-09 DIAGNOSIS — F33.0 MILD EPISODE OF RECURRENT MAJOR DEPRESSIVE DISORDER (H): ICD-10-CM

## 2023-08-09 DIAGNOSIS — F41.8 OTHER SPECIFIED ANXIETY DISORDERS: ICD-10-CM

## 2023-08-09 DIAGNOSIS — F90.2 ATTENTION DEFICIT HYPERACTIVITY DISORDER (ADHD), COMBINED TYPE: Primary | ICD-10-CM

## 2023-08-09 PROCEDURE — 90837 PSYTX W PT 60 MINUTES: CPT | Mod: VID

## 2023-08-09 ASSESSMENT — ANXIETY QUESTIONNAIRES
IF YOU CHECKED OFF ANY PROBLEMS ON THIS QUESTIONNAIRE, HOW DIFFICULT HAVE THESE PROBLEMS MADE IT FOR YOU TO DO YOUR WORK, TAKE CARE OF THINGS AT HOME, OR GET ALONG WITH OTHER PEOPLE: SOMEWHAT DIFFICULT
GAD7 TOTAL SCORE: 8
2. NOT BEING ABLE TO STOP OR CONTROL WORRYING: SEVERAL DAYS
7. FEELING AFRAID AS IF SOMETHING AWFUL MIGHT HAPPEN: SEVERAL DAYS
6. BECOMING EASILY ANNOYED OR IRRITABLE: SEVERAL DAYS
GAD7 TOTAL SCORE: 8
4. TROUBLE RELAXING: SEVERAL DAYS
1. FEELING NERVOUS, ANXIOUS, OR ON EDGE: MORE THAN HALF THE DAYS
3. WORRYING TOO MUCH ABOUT DIFFERENT THINGS: SEVERAL DAYS
5. BEING SO RESTLESS THAT IT IS HARD TO SIT STILL: SEVERAL DAYS

## 2023-08-09 ASSESSMENT — COLUMBIA-SUICIDE SEVERITY RATING SCALE - C-SSRS
SUICIDE, SINCE LAST CONTACT: NO
TOTAL  NUMBER OF INTERRUPTED ATTEMPTS SINCE LAST CONTACT: NO
ATTEMPT SINCE LAST CONTACT: NO
2. HAVE YOU ACTUALLY HAD ANY THOUGHTS OF KILLING YOURSELF?: NO
1. SINCE LAST CONTACT, HAVE YOU WISHED YOU WERE DEAD OR WISHED YOU COULD GO TO SLEEP AND NOT WAKE UP?: NO
TOTAL  NUMBER OF ABORTED OR SELF INTERRUPTED ATTEMPTS SINCE LAST CONTACT: NO
6. HAVE YOU EVER DONE ANYTHING, STARTED TO DO ANYTHING, OR PREPARED TO DO ANYTHING TO END YOUR LIFE?: NO

## 2023-08-09 ASSESSMENT — PATIENT HEALTH QUESTIONNAIRE - PHQ9
10. IF YOU CHECKED OFF ANY PROBLEMS, HOW DIFFICULT HAVE THESE PROBLEMS MADE IT FOR YOU TO DO YOUR WORK, TAKE CARE OF THINGS AT HOME, OR GET ALONG WITH OTHER PEOPLE: SOMEWHAT DIFFICULT
SUM OF ALL RESPONSES TO PHQ QUESTIONS 1-9: 5
SUM OF ALL RESPONSES TO PHQ QUESTIONS 1-9: 5

## 2023-08-09 NOTE — PROGRESS NOTES
[facility]                                     Progress Note    Patient Name: Vidhi Tovra  Date: 8/9/2023       Service Type: Individual      Session Start Time: 9:30 AM  Session End Time: 10:27 AM     Session Length: 53+ min    Session #:  16    Attendees: Client attended alone    Service Modality:  Video Visit:      Provider verified identity through the following two step process.  Patient provided:  Patient is known previously to provider    Telemedicine Visit: The patient's condition can be safely assessed and treated via synchronous audio and visual telemedicine encounter.      Reason for Telemedicine Visit: Patient has requested telehealth visit    Originating Site (Patient Location): Patient's home    Distant Site (Provider Location): Provider Remote Setting- Home Office    Consent:  The patient/guardian has verbally consented to: the potential risks and benefits of telemedicine (video visit) versus in person care; bill my insurance or make self-payment for services provided; and responsibility for payment of non-covered services.     Patient would like the video invitation sent by:  My Chart    Mode of Communication:  Video Conference via [facility]    Distant Location (Provider):  Off-site    As the provider I attest to compliance with applicable laws and regulations related to telemedicine.    DATA  Extended Session (53+ minutes): PROLONGED SERVICE IN THE OUTPATIENT SETTING REQUIRING DIRECT (FACE-TO-FACE) PATIENT CONTACT BEYOND THE USUAL SERVICE:    - Patient's presenting concerns require more intensive intervention than could be completed within the usual service  Interactive Complexity: No  Crisis: No        Progress Since Last Session (Related to Symptoms / Goals / Homework):   Symptoms: Worsening based on PHQ-9 and DIONNE-7 scores and patient self-report.     Homework: Partially completed      Episode of Care Goals: Satisfactory progress - ACTION (Actively working towards change);  "Intervened by reinforcing change plan / affirming steps taken     Current / Ongoing Stressors and Concerns:    Patient reports she is concerned about her parents and them aging. Patient reports she is concerned about \"the world\". Patient reports she is concerned about her interpersonal relationships. Patient reports she is concerned about organization skills. Patient reports she is concerned about how her ADHD impacts social relationships.Patient reports concerns about rejection sensitivity. Patient reports she is concerned about her brother and his family. Patient reports she is concerned about the role she plays in her family dynamic. Patient reports she is concerned about anger. Patient reports she is concerned about her sleep habits. Patient reports she is concerned about impulsivity. Patient reports she is concerned about an internal conflict.      Treatment Objective(s) Addressed in This Session:    Patient will increase interpersonal effectiveness skills with family and friends to help manage conflict.    Patient will build upon the authentic self.         Patient will bring to session interpersonal conflicts to process and explore.   Patient will practice identifying and communicating her needs to others.     Intervention:   Motivational Interviewing: supported patient in processing and exploring an internal conflict.   Validated patient's emotions. Supported patient in identifying how she was feeling about this conflict. Discussed effective communication skills. Discussed labeling emotions internally to be able to share them externally. Discussed expectations of others once she shares her emotions with them. Discussed intent versus impact and owning our impact. Validated patient's use of skills and emotions related to this conflict.    Reviewed treatment plan and goals with patient.     Assessments completed prior to visit:  The following assessments were completed by patient for this visit:  PHQ9:       " 2/1/2023     9:28 AM 2/15/2023     5:23 AM 3/22/2023    10:18 AM 3/29/2023    10:20 AM 4/5/2023    10:27 AM 5/18/2023    10:00 AM 8/9/2023     9:17 AM   PHQ-9 SCORE   PHQ-9 Total Score MyChart 2 (Minimal depression) 1 (Minimal depression) 6 (Mild depression) 4 (Minimal depression) 4 (Minimal depression)  5 (Mild depression)   PHQ-9 Total Score 2 1 6 4 4 3 5     GAD7:       12/2/2022    10:00 AM 1/3/2023    11:00 AM 2/1/2023     9:27 AM 2/15/2023     8:00 AM 3/22/2023    10:00 AM 5/18/2023    10:00 AM 8/9/2023     9:00 AM   DIONNE-7 SCORE   Total Score   0 (minimal anxiety)       Total Score 6 5 0 2 6 5 8     PROMIS 10-Global Health (only subscores and total score):       6/28/2022    11:31 PM 7/19/2022    11:49 AM 10/31/2022     2:19 PM 12/14/2022     8:31 AM 2/1/2023     9:29 AM 5/18/2023    10:35 AM 8/9/2023     9:33 AM   PROMIS-10 Scores Only   Global Mental Health Score 11 11 13 12 15 14 14   Global Physical Health Score 15 16 14 16 16 16 15   PROMIS TOTAL - SUBSCORES 26 27 27 28 31 30 29         ASSESSMENT: Current Emotional / Mental Status (status of significant symptoms):   Risk status (Self / Other harm or suicidal ideation)   Patient denies current fears or concerns for personal safety.   Patient denies current or recent suicidal ideation or behaviors.   Patient denies current or recent homicidal ideation or behaviors.   Patient denies current or recent self injurious behavior or ideation.   Patient denies other safety concerns.   Patient reports there has been no change in risk factors since their last session.     Patient reports there has been no change in protective factors since their last session.     Recommended that patient call 911 or go to the local ED should there be a change in any of these risk factors.     Appearance:   Appropriate    Eye Contact:   Good    Psychomotor Behavior: Normal    Attitude:   Cooperative    Orientation:   All   Speech    Rate / Production: Hyperverbal     Volume:  Normal     Mood:    Anxious  Depressed  Sad    Affect:    Tearful Worrisome    Thought Content:  Rumination    Thought Form:  Coherent    Insight:    Good      Medication Review:   No current psychiatric medications prescribed     Medication Compliance:   Yes     Changes in Health Issues:   None reported     Chemical Use Review:   Substance Use: Problem use continues with no change since last session, Not addressed in session        Tobacco Use: No current tobacco use.      Diagnosis:  1. Attention deficit hyperactivity disorder (ADHD), combined type    2. Mild episode of recurrent major depressive disorder (H)    3. Other specified anxiety disorders        Collateral Reports Completed:   Not Applicable    PLAN: (Patient Tasks / Therapist Tasks / Other)  Patient will practice externalizing her emotions. Patient will practice reflecting on her expectations of others once she externalizes her emotions. We will discuss next session.         Chaya Harmon, [facility]  8/9/2023  _____________________________________________________                                              Individual Treatment Plan    Patient's Name: Vidhi Tovar  YOB: 1980    Date of Creation: 11/9/2022  Date Treatment Plan Last Reviewed/Revised: 8/9/2023    DSM5 Diagnoses: Attention-Deficit/Hyperactivity Disorder  314.01 (F90.2) Combined presentation, 296.31 (F33.0) Major Depressive Disorder, Recurrent Episode, Mild _ or 300.09 (F41.8) Other Specified Anxiety Disorder   Psychosocial / Contextual Factors: Patient is self-employed. Patient is single. Patient is taking care of her elderly parents.   PROMIS (reviewed every 90 days):   The following assessments were completed by patient for this visit:  PROMIS 10-Global Health (only subscores and total score):       6/28/2022    11:31 PM 7/19/2022    11:49 AM 10/31/2022     2:19 PM 12/14/2022     8:31 AM 2/1/2023     9:29 AM 5/18/2023    10:35 AM 8/9/2023     9:33 AM   PROMIS-10 Scores Only    Global Mental Health Score 11 11 13 12 15 14 14   Global Physical Health Score 15 16 14 16 16 16 15   PROMIS TOTAL - SUBSCORES 26 27 27 28 31 30 29       Referral / Collaboration:  Referral to another professional/service is not indicated at this time.    Anticipated number of session for this episode of care: 6-9 sessions  Anticipation frequency of session: Every other week  Anticipated Duration of each session: 38-52 minutes  Treatment plan will be reviewed in 90 days or when goals have been changed. There has been demonstrated improvement in functioning while patient has been engaged in psychotherapy/psychological service- if withdrawn the patient would deteriorate and/or relapse.       MeasurableTreatment Goal(s) related to diagnosis / functional impairment(s)  Goal 1:Patient will work on stabilizing symptoms of ADHD.      Objective #A  Patient will learn about the diagnosis and symptoms of ADHD.   Status: Continued - Date(s): 8/9/2023     Intervention(s)  Therapist will teach about the diagnosis of ADHD.     Objective #B  Patient will learn 3 skills to improve organization.                        Status: Continued - Date(s): 8/9/2023     Intervention(s)  Therapist will teach organizational skills.      Objective #C   Patient will learn how ADHD can impact interpersonal relationships and social skills.   Status: Continued - Date(s): 8/9/2023     Intervention(s)  Therapist will teach about the symptoms of ADHD and how a neuro-divergent brain tries to build social connections.        Goal 2: Client will work on stabilizing anxiety symptoms as evidenced by DIONNE-7 scores (current is 5) and Self report.  Goal is to maintain this score.      Objective #A Client will identify triggers of anxiety and process them in session.    Status: Continued - Date(s): 8/9/2023    Intervention(s)  Therapist will teach emotional recognition/identification by assigning homework to journal with written exercises.    Objective  #B  Client will identify initial signs or symptoms of anxiety.    Status: Continued - Date(s): 8/9/2023    Intervention(s)  Therapist will teach emotional regulation skills, such as deep breathing and mindfulness skills.    Objective #C  Client will learn about co-morbidities between ADHD and anxiety disorders.   Status: Continued - Date(s): 8/9/2023    Intervention(s)  Therapist will provide educational materials on anxiety disorders and ADHD.       Goal 3: Patient will increase effectiveness of interpersonal communication skills.      Objective #A  Patient will increase interpersonal effectiveness skills with family and friends to help manage conflict.    Status: New - Date: 8/9/2023     Intervention(s)  Therapist will role-play conflict management.     Objective #B  Patient will build upon the authentic self.         Status: New - Date: 8/9/2023     Intervention(s)  Therapist will assign homework through written exercises.     Objective #C  Patient will bring to session interpersonal conflicts to process and explore.   Status: New - Date: 8/9/2023     Intervention(s)  Therapist will teach about healthy boundaries related to interpersonal relationships.     Objective #D  Patient will practice identifying and communicating her needs to others.  Status: New - Date: 8/9/2023     Intervention(s)  Therapist will teach about effective communication skills and how to identify emotions.       Patient has reviewed and agreed to the above plan.      Chaya Harmon, LORENA   November 9, 2022 Reviewed 2/1/2023 Re-reviewed 5/18/2023 Re-reviewed 8/9/2023

## 2023-08-12 ENCOUNTER — HEALTH MAINTENANCE LETTER (OUTPATIENT)
Age: 43
End: 2023-08-12

## 2023-09-12 ENCOUNTER — TELEPHONE (OUTPATIENT)
Dept: PSYCHOLOGY | Facility: CLINIC | Age: 43
End: 2023-09-12
Payer: COMMERCIAL

## 2023-09-12 NOTE — TELEPHONE ENCOUNTER
Provider called patient to offer an open session. Provider gave patient her office phone number to respond. Provider reminded patient of her next scheduled session in September.

## 2023-10-05 ENCOUNTER — VIRTUAL VISIT (OUTPATIENT)
Dept: PSYCHOLOGY | Facility: CLINIC | Age: 43
End: 2023-10-05
Payer: COMMERCIAL

## 2023-10-05 DIAGNOSIS — F90.2 ATTENTION DEFICIT HYPERACTIVITY DISORDER (ADHD), COMBINED TYPE: Primary | ICD-10-CM

## 2023-10-05 DIAGNOSIS — F41.8 OTHER SPECIFIED ANXIETY DISORDERS: ICD-10-CM

## 2023-10-05 DIAGNOSIS — F33.0 MILD EPISODE OF RECURRENT MAJOR DEPRESSIVE DISORDER (H): ICD-10-CM

## 2023-10-05 PROCEDURE — 90837 PSYTX W PT 60 MINUTES: CPT | Mod: VID

## 2023-10-05 ASSESSMENT — ANXIETY QUESTIONNAIRES
4. TROUBLE RELAXING: SEVERAL DAYS
2. NOT BEING ABLE TO STOP OR CONTROL WORRYING: NOT AT ALL
1. FEELING NERVOUS, ANXIOUS, OR ON EDGE: SEVERAL DAYS
3. WORRYING TOO MUCH ABOUT DIFFERENT THINGS: SEVERAL DAYS
IF YOU CHECKED OFF ANY PROBLEMS ON THIS QUESTIONNAIRE, HOW DIFFICULT HAVE THESE PROBLEMS MADE IT FOR YOU TO DO YOUR WORK, TAKE CARE OF THINGS AT HOME, OR GET ALONG WITH OTHER PEOPLE: SOMEWHAT DIFFICULT
GAD7 TOTAL SCORE: 7
6. BECOMING EASILY ANNOYED OR IRRITABLE: SEVERAL DAYS
5. BEING SO RESTLESS THAT IT IS HARD TO SIT STILL: MORE THAN HALF THE DAYS
7. FEELING AFRAID AS IF SOMETHING AWFUL MIGHT HAPPEN: SEVERAL DAYS
GAD7 TOTAL SCORE: 7

## 2023-10-05 ASSESSMENT — PATIENT HEALTH QUESTIONNAIRE - PHQ9: SUM OF ALL RESPONSES TO PHQ QUESTIONS 1-9: 8

## 2023-10-20 DIAGNOSIS — F90.0 ADHD (ATTENTION DEFICIT HYPERACTIVITY DISORDER), INATTENTIVE TYPE: ICD-10-CM

## 2023-10-20 RX ORDER — BUPROPION HYDROCHLORIDE 150 MG/1
150 TABLET ORAL EVERY MORNING
Qty: 90 TABLET | Refills: 2 | Status: SHIPPED | OUTPATIENT
Start: 2023-10-20 | End: 2023-12-11

## 2023-10-20 NOTE — TELEPHONE ENCOUNTER
buPROPion (WELLBUTRIN XL) 150 MG 24 hr tablet 90 tablet 1 4/19/2023     Last Office Visit : 6/14/2023   Future Office visit:  none

## 2023-11-08 ENCOUNTER — OFFICE VISIT (OUTPATIENT)
Dept: FAMILY MEDICINE | Facility: CLINIC | Age: 43
End: 2023-11-08
Payer: COMMERCIAL

## 2023-11-08 ENCOUNTER — MYC MEDICAL ADVICE (OUTPATIENT)
Dept: PSYCHIATRY | Facility: CLINIC | Age: 43
End: 2023-11-08
Payer: COMMERCIAL

## 2023-11-08 VITALS
TEMPERATURE: 98.6 F | HEART RATE: 68 BPM | OXYGEN SATURATION: 94 % | SYSTOLIC BLOOD PRESSURE: 112 MMHG | HEIGHT: 67 IN | WEIGHT: 189 LBS | BODY MASS INDEX: 29.66 KG/M2 | DIASTOLIC BLOOD PRESSURE: 77 MMHG

## 2023-11-08 DIAGNOSIS — B00.9 HSV (HERPES SIMPLEX VIRUS) INFECTION: ICD-10-CM

## 2023-11-08 DIAGNOSIS — Z23 NEED FOR PROPHYLACTIC VACCINATION AND INOCULATION AGAINST INFLUENZA: ICD-10-CM

## 2023-11-08 DIAGNOSIS — L80 VITILIGO: ICD-10-CM

## 2023-11-08 DIAGNOSIS — K22.4 ESOPHAGEAL SPASM: ICD-10-CM

## 2023-11-08 DIAGNOSIS — K64.4 EXTERNAL HEMORRHOIDS: Primary | ICD-10-CM

## 2023-11-08 DIAGNOSIS — K62.5 RECTAL BLEEDING: ICD-10-CM

## 2023-11-08 DIAGNOSIS — F41.9 ANXIETY: ICD-10-CM

## 2023-11-08 DIAGNOSIS — M25.511 ACUTE PAIN OF RIGHT SHOULDER: ICD-10-CM

## 2023-11-08 RX ORDER — ALPRAZOLAM 0.25 MG
0.25 TABLET ORAL 3 TIMES DAILY PRN
Qty: 10 TABLET | Refills: 0 | Status: SHIPPED | OUTPATIENT
Start: 2023-11-08 | End: 2024-07-03

## 2023-11-08 RX ORDER — HYDROCORTISONE ACETATE 25 MG/1
25 SUPPOSITORY RECTAL 2 TIMES DAILY
Qty: 24 SUPPOSITORY | Refills: 0 | Status: SHIPPED | OUTPATIENT
Start: 2023-11-08

## 2023-11-08 RX ORDER — VALACYCLOVIR HYDROCHLORIDE 1 G/1
1000 TABLET, FILM COATED ORAL 2 TIMES DAILY
Qty: 20 TABLET | Refills: 0 | Status: SHIPPED | OUTPATIENT
Start: 2023-11-08

## 2023-11-08 RX ORDER — TACROLIMUS 1 MG/G
OINTMENT TOPICAL 2 TIMES DAILY
Qty: 100 G | Refills: 5 | Status: SHIPPED | OUTPATIENT
Start: 2023-11-08 | End: 2024-02-15

## 2023-11-08 NOTE — NURSING NOTE
"ROOM:1  [facility]    Preferred Name: Mindy     How did you hear about us?  Current Patient    43 year old  Chief Complaint   Patient presents with     Shoulder right     Right quad - pain, certain positions over extended, massages help     Rectal Problem     Blood in stool - every time for 3 weeks, gone away for the last 3 days, bright red, pain       Throat Problem     Esophagus spasms - 4 years ago, but now it's too the point where she will throw up, pain is bad.     Refill Request       Blood pressure 112/77, pulse 68, temperature 98.6  F (37  C), temperature source Oral, height 1.702 m (5' 7\"), weight 85.7 kg (189 lb), last menstrual period 10/24/2023, SpO2 94%. Body mass index is 29.6 kg/m .  BP completed using cuff size:        Patient Active Problem List   Diagnosis     ADHD (attention deficit hyperactivity disorder), inattentive type     Depression, unspecified depression type     Moderate episode of recurrent major depressive disorder (H)       Wt Readings from Last 2 Encounters:   11/08/23 85.7 kg (189 lb)   06/14/23 82.9 kg (182 lb 11.2 oz)     BP Readings from Last 3 Encounters:   11/08/23 112/77   06/14/23 109/71   01/27/23 126/81       No Known Allergies    Current Outpatient Medications   Medication     ALPRAZolam (XANAX) 0.25 MG tablet     buPROPion (WELLBUTRIN XL) 150 MG 24 hr tablet     valACYclovir HCl (VALTREX PO)     tacrolimus (PROTOPIC) 0.1 % external ointment     No current facility-administered medications for this visit.       Social History     Tobacco Use     Smoking status: Former     Smokeless tobacco: Never   Vaping Use     Vaping Use: Never used   Substance Use Topics     Alcohol use: Not Currently     Comment: former     Drug use: Yes     Types: Marijuana, Psilocybin     Comment: occasional use       Honoring Choices - Health Care Directive Guide offered to patient at time of visit.    Health Maintenance Due   Topic Date Due     ADVANCE CARE PLANNING  Never done     DEPRESSION " "ACTION PLAN  Never done     HEPATITIS B IMMUNIZATION (2 of 3 - Hep B Twinrix 3-dose series) 11/15/2014     YEARLY PREVENTIVE VISIT  06/13/2023     INFLUENZA VACCINE (1) 09/01/2023     COVID-19 Vaccine (5 - 2023-24 season) 09/01/2023       Immunization History   Administered Date(s) Administered     COVID-19 Bivalent 18+ (Moderna) 11/14/2022     COVID-19 MONOVALENT 12+ (Pfizer) 07/26/2021, 08/16/2021     COVID-19 Monovalent 12+ (Pfizer 2022) 04/08/2022     Influenza Vaccine >6 months (Alfuria,Fluzone) 10/05/2022     Influenza Vaccine, 6+MO IM (QUADRIVALENT W/PRESERVATIVES) 11/01/2019, 10/13/2020     TDAP Vaccine (Boostrix) 06/17/2019       No results found for: \"PAP\"    Recent Labs   Lab Test 06/21/22  0807 05/19/21  0000   A1C 5.6  --    *  --    HDL 41*  --    TRIG 268* 196*   CR 0.81  --    GFRESTIMATED >90  --    POTASSIUM 4.1  --    TSH 3.23  --            11/8/2023     6:33 AM 12/14/2022     8:07 AM   PHQ-2 ( 1999 Pfizer)   Q1: Little interest or pleasure in doing things 1 1   Q2: Feeling down, depressed or hopeless 1 1   PHQ-2 Score 2 2   Q1: Little interest or pleasure in doing things Several days Several days   Q2: Feeling down, depressed or hopeless Several days Several days   PHQ-2 Score 2 2           5/18/2023    10:00 AM 8/9/2023     9:17 AM 9/11/2023     9:25 AM 10/5/2023     1:00 PM   PHQ-9 SCORE   PHQ-9 Total Score MyChart  5 (Mild depression) 3 (Minimal depression)    PHQ-9 Total Score 3 5 3 8           5/18/2023    10:00 AM 8/9/2023     9:00 AM 10/5/2023     1:00 PM   DIONNE-7 SCORE   Total Score 5 8 7            No data to display                Abigail Edmondson CMA    November 8, 2023 9:21 AM    "

## 2023-11-08 NOTE — Clinical Note
Please see note #6, Mindy did not  the med but I re-ordered it and she has an appointment with you 6 months from her August appointment.

## 2023-11-08 NOTE — PROGRESS NOTES
Vidhi Tovar is a 43 year old female who presents today as a new patient to me to discuss several issues.  She has seen Martine Cuellar and Tracy Ryan in the past.    1.  Mindy has noted right shoulder pain for more than 6 weeks, she feels that when her arm is extended in certain positions the anterior shoulder is painful.  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  She has had a massage that has been helpful.  She does not remember any one particular injury but it may be overuse.    2.)  She describes an anterior right upper thigh, almost groin area pain, she does not remember either an injury but she has used massage and it is greatly improved.  She feels it when she applies a certain amount of pressure.  3.)  Mindy has had a 4 year history of esophageal spasm.  She noticed this first years ago and at that time did not relate it to a particular food.  She would have this spasm once in a month a few months per year until recently, she has noticed it increasing.  Most recently she had a severe spasm to the point of vomiting up the contents of what she had just eaten, twice in 2 month.  The vomiting relieved the pain. There is no blood in the vomit.  The spasm/pain lasts seconds to minutes, it is generally relieved by stopping what she is eating and/or doing and waiting for it to pass.  She has no other upper GI symptoms.  She has not taken medications for this.  The most recent episodes having always been associated with eating either rice, potatoes or carrots.  She knows she should just stop eating them but does not want to.  4.) Mindy has noted bright red bleeding during a bowel movement several times over the past 3 weeks.  It is generally associated with a hard stool bowel movement, she does not take any stool softeners or fiber, she generally does not have hard BMs.  She has a history of an external hemorrhoid that was  noticed during the pandemic for the first time.  The BMs were painful.  The bleeding has stopped over the past 3 days, her BMs are softer.  This has not happened before, she has not had a colonoscopy.  Her paternal grandparents had some kind of cancer but it is not clear if it is GI.  5.)  Mindy is requesting a refill on valcyclovir and alprazolam.  She keeps both on hand, the valcyclovir for a HSV (cold sore) outbreak, the alprazolam she uses maybe 4 times/month, for sleep or other known anxiety situations.    6.)  Mindy was prescribed tacrolimus ointment by a dermatologist in August of 2023 for vitiligo.  She sees it in her chart but has not used it and was not aware that it was sent in for her.  They did discuss this at the visit but she did not know she was supposed to pick this up at the pharmacy.  In reviewing the dermatology note it looks like she was on it when she was seen, Mindy said she was not and it was prescribed at that time.  It is clear that it was prescribed but she has not been using it and would like to to see if it will be helpful.    Mindy needs a full physical and health maintenance exam, we do not have a record of her last Pap.  She is willing to come in for that.      Review Of Systems   ROS: 10 point ROS neg other than the symptoms noted above in the HPI.      Past Medical History:   Diagnosis Date     ADHD (attention deficit hyperactivity disorder)      Depressive disorder      Fear of flying      Herpes simplex virus infection      Rosacea      Vitamin D deficiency      Vitiligo      No past surgical history on file.  Social History     Socioeconomic History     Marital status: Single     Spouse name: Not on file     Number of children: Not on file     Years of education: Not on file     Highest education level: Not on file   Occupational History     Not on file   Tobacco Use     Smoking status: Former     Smokeless tobacco: Never   Vaping Use     Vaping Use: Never used   Substance and  Sexual Activity     Alcohol use: Not Currently     Comment: former     Drug use: Yes     Types: Marijuana, Psilocybin     Comment: occasional use     Sexual activity: Not Currently     Partners: Male, Female     Birth control/protection: I.U.D.   Other Topics Concern     Not on file   Social History Narrative     Not on file     Social Determinants of Health     Financial Resource Strain: Low Risk  (11/8/2023)    Financial Resource Strain      Within the past 12 months, have you or your family members you live with been unable to get utilities (heat, electricity) when it was really needed?: No   Food Insecurity: Low Risk  (11/8/2023)    Food Insecurity      Within the past 12 months, did you worry that your food would run out before you got money to buy more?: No      Within the past 12 months, did the food you bought just not last and you didn t have money to get more?: No   Transportation Needs: Low Risk  (11/8/2023)    Transportation Needs      Within the past 12 months, has lack of transportation kept you from medical appointments, getting your medicines, non-medical meetings or appointments, work, or from getting things that you need?: No   Physical Activity: Not on file   Stress: Not on file   Social Connections: Not on file   Interpersonal Safety: Low Risk  (11/8/2023)    Interpersonal Safety      Do you feel physically and emotionally safe where you currently live?: Yes      Within the past 12 months, have you been hit, slapped, kicked or otherwise physically hurt by someone?: No      Within the past 12 months, have you been humiliated or emotionally abused in other ways by your partner or ex-partner?: No   Housing Stability: Low Risk  (11/8/2023)    Housing Stability      Do you have housing? : Yes      Are you worried about losing your housing?: No     Family History   Problem Relation Age of Onset     Hypertension Father      Substance Abuse Maternal Grandmother      Diabetes Maternal Grandmother       "Cancer Maternal Grandfather      Cancer Paternal Grandmother      Substance Abuse Paternal Grandfather      Depression Brother      Melanoma No family hx of      Skin Cancer No family hx of        /77   Pulse 68   Temp 98.6  F (37  C) (Oral)   Ht 1.702 m (5' 7\")   Wt 85.7 kg (189 lb)   LMP 10/24/2023   SpO2 94%   BMI 29.60 kg/m      Exam:  Constitutional: healthy, alert and mild distress  Gastrointestinal: Abdomen soft, non-tender. BS normal. No masses, organomegaly.  2 small external non-thrombosed hemorrhoids, digital exam was painful with no lesion felt on her right anal/rectal area.  No blood.    Psychiatric: mentation appears normal and affect normal/bright    Assessment/Plan:  1. Vitiligo    - tacrolimus (PROTOPIC) 0.1 % external ointment; Apply topically 2 times daily To the affected areas of depigmentation on the face, trunk, and extremities (including the skin folds).  Dispense: 100 g; Refill: 5    2. External hemorrhoids    - hydrocortisone (ANUSOL-HC) 25 MG suppository; Place 1 suppository (25 mg) rectally 2 times daily  Dispense: 24 suppository; Refill: 0    3. Rectal bleeding    - hydrocortisone (ANUSOL-HC) 25 MG suppository; Place 1 suppository (25 mg) rectally 2 times daily  Dispense: 24 suppository; Refill: 0  Will consider colonoscopy    4. Anxiety    - ALPRAZolam (XANAX) 0.25 MG tablet; Take 1 tablet (0.25 mg) by mouth 3 times daily as needed for anxiety  Dispense: 10 tablet; Refill: 0    5. HSV (herpes simplex virus) infection    - valACYclovir (VALTREX) 1000 mg tablet; Take 1 tablet (1,000 mg) by mouth 2 times daily  Dispense: 20 tablet; Refill: 0    6. Acute pain of right shoulder    - XR Shoulder Right 2 Views; Future  - Physical Therapy Referral; Future    7. Need for prophylactic vaccination and inoculation against influenza    I spent 50 minutes with Mindy, going over all her concerns and plan for treatment and care.  She will return to the clinic for a full physical exam I the " near future.      Options for treatment and follow-up care were reviewed with the patient. Patient engaged in the decision making process and verbalized understanding of the options discussed and agreed with the final plan.

## 2023-11-09 ENCOUNTER — E-CONSULT (OUTPATIENT)
Dept: GASTROENTEROLOGY | Facility: CLINIC | Age: 43
End: 2023-11-09
Payer: COMMERCIAL

## 2023-11-09 ENCOUNTER — TELEPHONE (OUTPATIENT)
Dept: FAMILY MEDICINE | Facility: CLINIC | Age: 43
End: 2023-11-09

## 2023-11-09 DIAGNOSIS — K62.5 RECTAL BLEEDING: Primary | ICD-10-CM

## 2023-11-09 DIAGNOSIS — K22.4 ESOPHAGEAL SPASM: ICD-10-CM

## 2023-11-09 PROCEDURE — 99451 NTRPROF PH1/NTRNET/EHR 5/>: CPT | Performed by: INTERNAL MEDICINE

## 2023-11-09 NOTE — PROGRESS NOTES
11/9/2023     E-Consult has been accepted.    Interprofessional consultation requested by:  Mary Schwartz NP      Clinical Question/Purpose: MY CLINICAL QUESTION IS: Ms Tovar has had esophageal spasms for 4 years, off and on.  Recently they have escalated in frequency and severity and now has associated vomiting.  The pain is severe, it is now always associated with either rice, potatoes or carrots. She does not have difficulty swallowing.     Patient assessment and information reviewed:   Question of esophageal spasm? - discomfort in chest - now possibly related to eating food - has had to vomit (or regurgitate?) food  Rectal bleeding    To further evaluate symptoms recommend EGD. This will help ensure there is  no esophagitis or esophageal stricture of other pathology. We will arrange for esophageal clinic follow up to go over results of EGD and if symptoms warrant high resolution manometry could be considered    Rectal bleeding may be related to hemorrhoids but would recommend colonoscopy as she is almost due for colon cancer screening anyway.    Recommendations:   -please place adult GI  referral for EGD/colonoscopy   -we will arrange esophageal clinic follow up      The recommendations provided in this E-Consult are based on a review of clinical data pertinent to the clinical question presented, without a review of the patient's complete medical record or, the benefit of a comprehensive in-person or virtual patient evaluation. This consultation should not replace the clinical judgement and evaluation of the provider ordering this E-Consult. Any new clinical issues, or changes in patient status since the filing of this E-Consult will need to be taken into account when assessing these recommendations. Please contact me if you have further questions.    My total time spent reviewing clinical information and formulating assessment was 30 minutes.        Osbaldo Saunders MD

## 2023-11-09 NOTE — TELEPHONE ENCOUNTER
RN reviewed patient MyChart message regarding break through depression and wanting to discuss ADHD medication.    Per last visit note with Venice Alejandra NP dated 6/9/23  Behavioral    Last System Assessment & Plan 6/9/2023 Virtual Visit Edited 6/9/2023 11:01 AM by Venice Alejandra DNP       Psychiatrically stable at present. The current medical regimen is effective; continue present plan and medications.      Discussed stopping the bupropion for 8 days to evaluate if the memory decrease is related to the bupropion or separate.  She feels the bupropion has been robustly helpful and likely will continue this dose.     Will return to PCP for ongoing care, medication prescribing and future medication refills.  PCP recently refilled for 6 months.  Follow up with your PCP in about 3 months. Patient can be re-referred back to this service for further consultation in the future if needed but a new referral will need to be placed.    2.   RN responded back to the patent indicating that she will need a referral from PCP to see Venice again.    Dottie Jane RN on 11/9/2023 at 8:50 AM

## 2023-11-09 NOTE — TELEPHONE ENCOUNTER
I called and left a message for Mindy to follow up on her e-consult with GI.  The recommendation was to do a EGD and a colonoscopy for her symptoms, I have ordered those.  The GI team will follow up with her after her procedures.

## 2023-11-13 ENCOUNTER — TELEPHONE (OUTPATIENT)
Dept: FAMILY MEDICINE | Facility: CLINIC | Age: 43
End: 2023-11-13

## 2023-11-13 NOTE — TELEPHONE ENCOUNTER
Prior Authorization Retail Medication Request    Medication/Dose: tacrolimus (PROTOPIC) 0.1 % external ointment  ICD code (if different than what is on RX):  See chart   Previously Tried and Failed:  See chart   Rationale:  See chart     Insurance Name:  [employer] [facility] [employer]  Insurance ID:  [id number]      Pharmacy Information (if different than what is on RX)  Name:  [facility]  Phone:  [phone]

## 2023-11-15 NOTE — TELEPHONE ENCOUNTER
PRIOR AUTHORIZATION DENIED    Medication: TACROLIMUS 0.1 % EX OINT  Insurance Company: [employer] - Phone [phone] Fax [phone]  Denial Date: 11/15/2023  Denial Rational:     Isak from [employer] called with denial. PA is denied due to diagnosis of litiligo and being use as cosmetic use.      Appeal Information:     Phone [phone]   Fax: [phone]    Patient Notified: No     [Trigger point 1-2 muscle groups] : trigger point 1-2 muscle groups [Left] : of the left [Gluteus Jesu muscle] : gluteus jesu muscle [Pain] : pain [Alcohol] : alcohol [Betadine] : betadine [Ethyl Chloride sprayed topically] : ethyl chloride sprayed topically [Sterile technique used] : sterile technique used [___ cc    1%] : Lidocaine ~Vcc of 1%  [___ cc    40mg] : Triamcinolone (Kenalog) ~Vcc of 40 mg  [] : Patient tolerated procedure well [Risks, benefits, alternatives discussed / Verbal consent obtained] : the risks benefits, and alternatives have been discussed, and verbal consent was obtained

## 2023-11-15 NOTE — TELEPHONE ENCOUNTER
Prior Authorization was denied by the Insurance company.     If provider would like to appeal -    Please write a Letter of Medical Necessity. There is a template under the  letters tab : [facility] MEDICAL NECESSITY MEDICATION. Route back to [facility] CMA Pool to start the appeal process once the letter is complete. Please let us know if you have any additional lab values/information to include in the appeal or if you would you like to include any research articles as well.    If provider would prefer to send an alternative medication -   Please route a message back to the [facility] CMA Pool once the new medication was sent so we can inform the patient.

## 2023-11-15 NOTE — TELEPHONE ENCOUNTER
Central Prior Authorization Team   Phone: [phone]    PA Initiation    Medication: TACROLIMUS 0.1 % EX OINT  Insurance Company: [employer] - Phone [phone] Fax [phone]  Pharmacy Filling the Rx: [id number] - [facility] [address] [place] [address]  Filling Pharmacy Phone: [phone]  Filling Pharmacy Fax:    Start Date: 11/15/2023

## 2023-11-27 ENCOUNTER — TELEPHONE (OUTPATIENT)
Dept: PSYCHOLOGY | Facility: CLINIC | Age: 43
End: 2023-11-27
Payer: COMMERCIAL

## 2023-11-27 NOTE — TELEPHONE ENCOUNTER
Provider called patient to see if she would like to take an open session on 12/28 at 1:30 PM. Provider gave patient the option to schedule through [email or web address] or call the behavioral access team if she wanted to schedule.

## 2023-11-29 ENCOUNTER — TELEPHONE (OUTPATIENT)
Dept: GASTROENTEROLOGY | Facility: CLINIC | Age: 43
End: 2023-11-29
Payer: COMMERCIAL

## 2023-11-29 NOTE — TELEPHONE ENCOUNTER
"Endoscopy Scheduling Screen    Have you had a positive Covid test in the last 14 days?  No    Are you active on MyChart?   Yes    What insurance is in the chart?  Other:  [facility]    Ordering/Referring Provider:Mary Schwartz   (If ordering provider performs procedure, schedule with ordering provider unless otherwise instructed. )    BMI: Estimated body mass index is 29.6 kg/m  as calculated from the following:    Height as of 11/8/23: 1.702 m (5' 7\").    Weight as of 11/8/23: 85.7 kg (189 lb).     Sedation Ordered  moderate sedation.   If patient BMI > 50 do not schedule in [facility].    If patient BMI > 45 do not schedule at [facility].    Are you taking methadone or Suboxone?  No    Are you taking any prescription medications for pain 3 or more times per week?   No    Do you have a history of malignant hyperthermia or adverse reaction to anesthesia?  No    (Females) Are you currently pregnant?   No     Have you been diagnosed or told you have pulmonary hypertension?   No    Do you have an LVAD?  No    Have you been told you have moderate to severe sleep apnea?  No    Have you been told you have COPD, asthma, or any other lung disease?  No    Do you have any heart conditions?  No     Have you ever had an organ transplant?   No    Have you ever had or are you awaiting a heart or lung transplant?   No    Have you had a stroke or transient ischemic attack (TIA aka \"mini stroke\" in the last 6 months?   No    Have you been diagnosed with or been told you have cirrhosis of the liver?   No    Are you currently on dialysis?   No    Do you need assistance transferring?   No    BMI: Estimated body mass index is 29.6 kg/m  as calculated from the following:    Height as of 11/8/23: 1.702 m (5' 7\").    Weight as of 11/8/23: 85.7 kg (189 lb).     Is patients BMI > 40 and scheduling location [facility]?  No    Do you take an injectable medication for weight loss or diabetes (excluding insulin)?  No    Do you take the medication " Naltrexone?  No    Do you take blood thinners?  No       Prep   Are you currently on dialysis or do you have chronic kidney disease?  No    Do you have a diagnosis of diabetes?  No    Do you have a diagnosis of cystic fibrosis (CF)?  No    On a regular basis do you go 3 -5 days between bowel movements?  No    BMI > 40?  No    Preferred Pharmacy:        [id number] - [place], [place] - [address] [facility]  [address] [facility] [address]  Phone: [phone] Fax: [phone]      Final Scheduling Details   Colonoscopy prep sent?  Standard MiraLAX    Procedure scheduled  Colonoscopy / Upper endoscopy (EGD)    Surgeon:  Chip     Date of procedure:  3/19/24     Pre-OP / PAC:   No - Not required for this site.    Location  [facility] - Per order.    Sedation   Moderate Sedation - Per order.      Patient Reminders:   You will receive a call from a Nurse to review instructions and health history.  This assessment must be completed prior to your procedure.  Failure to complete the Nurse assessment may result in the procedure being cancelled.      On the day of your procedure, please designate an adult(s) who can drive you home stay with you for the next 24 hours. The medicines used in the exam will make you sleepy. You will not be able to drive.      You cannot take public transportation, ride share services, or non-medical taxi service without a responsible caregiver.  Medical transport services are allowed with the requirement that a responsible caregiver will receive you at your destination.  We require that drivers and caregivers are confirmed prior to your procedure.

## 2023-12-08 ENCOUNTER — E-VISIT (OUTPATIENT)
Dept: URGENT CARE | Facility: CLINIC | Age: 43
End: 2023-12-08
Payer: COMMERCIAL

## 2023-12-08 DIAGNOSIS — R42 LIGHTHEADED: Primary | ICD-10-CM

## 2023-12-08 PROCEDURE — 99207 PR NON-BILLABLE SERV PER CHARTING: CPT | Performed by: PHYSICIAN ASSISTANT

## 2023-12-08 NOTE — PATIENT INSTRUCTIONS
Dear Vidhi Tovar,    We are sorry you are not feeling well. Based on the responses you provided, it is recommended that you make a virtual appointment in urgent care so we can better evaluate your symptoms. Please click here to find the nearest urgent care location to you.   You will not be charged for this Visit. Thank you for trusting us with your care.    Fabian Duffy PA-C

## 2023-12-11 ENCOUNTER — ANCILLARY PROCEDURE (OUTPATIENT)
Dept: GENERAL RADIOLOGY | Facility: CLINIC | Age: 43
End: 2023-12-11
Attending: NURSE PRACTITIONER
Payer: COMMERCIAL

## 2023-12-11 ENCOUNTER — VIRTUAL VISIT (OUTPATIENT)
Dept: FAMILY MEDICINE | Facility: CLINIC | Age: 43
End: 2023-12-11
Payer: COMMERCIAL

## 2023-12-11 DIAGNOSIS — F90.0 ADHD (ATTENTION DEFICIT HYPERACTIVITY DISORDER), INATTENTIVE TYPE: ICD-10-CM

## 2023-12-11 DIAGNOSIS — F32.0 CURRENT MILD EPISODE OF MAJOR DEPRESSIVE DISORDER, UNSPECIFIED WHETHER RECURRENT (H): Primary | ICD-10-CM

## 2023-12-11 DIAGNOSIS — M25.511 ACUTE PAIN OF RIGHT SHOULDER: ICD-10-CM

## 2023-12-11 DIAGNOSIS — U07.1 INFECTION DUE TO 2019 NOVEL CORONAVIRUS: ICD-10-CM

## 2023-12-11 PROCEDURE — 73030 X-RAY EXAM OF SHOULDER: CPT | Mod: RT | Performed by: RADIOLOGY

## 2023-12-11 RX ORDER — BUPROPION HYDROCHLORIDE 150 MG/1
300 TABLET ORAL EVERY MORNING
Qty: 180 TABLET | Refills: 3 | Status: SHIPPED | OUTPATIENT
Start: 2023-12-11 | End: 2024-01-10

## 2023-12-11 NOTE — PROGRESS NOTES
"Mindy is a 43 year old who is being evaluated via a billable video visit.      How would you like to obtain your AVS? MyChart  If the video visit is dropped, the invitation should be resent by: Text to cell phone: [phone]  Will anyone else be joining your video visit? No    Subjective   Mindy is a 43 year old, presenting for the following health issues:  Covid Concern (Tested positive last Monday, tested negative yesterday and today/Has some symptoms that she would like to go over)    Mindy tested positive for COVID 4 days ago, she was feeling quite ill with nasal stuffiness, body aches, fever, overall feeling ill.  She had a sensation of vibration behind her right knee and wondered if there was any relationship there to COVID.  She is feeling much better today with just a slight stuffy nose, she has significant more energy.      Mindy started taking a fiber supplement and her bowel problems, specifically her hemorrhoid concern seems completely resolved.  She has a colonoscopy scheduled in March which she will follow through with.    Right now Mindy is experiencing what her mental health care practitioner calls \"break through depression.\"  She is on bupropion 150 XL daily and it was working very well until recently.  She is aware of the potential seasonal effect, she is just wondering if there is a way to manage this now.  She also is wondering about ADHD, she has had neuropsych testing that diagnosed this.  Her mother was told when Mindy was in 3rd grade that she should be tested, they didn't do that and she feels she has been self-medicating since that time.  She has completely stopped drinking alcohol, she felt that might have been a coping device because she feels her ADD/ADHD symptoms of focus and organization are worsening.      Review of Systems   CONSTITUTIONAL: NEGATIVE for fever, chills, change in weight  ENT/MOUTH: NEGATIVE for ear, mouth and throat problems  RESP: NEGATIVE for significant cough or " SOB  CV: NEGATIVE for chest pain, palpitations or peripheral edema      Objective           Vitals:  No vitals were obtained today due to virtual visit.    Physical Exam   GENERAL: Healthy, alert and no distress  EYES: Eyes grossly normal to inspection.  No discharge or erythema, or obvious scleral/conjunctival abnormalities.  RESP: No audible wheeze, cough, or visible cyanosis.  No visible retractions or increased work of breathing.    SKIN: Visible skin clear. No significant rash, abnormal pigmentation or lesions.  NEURO: Cranial nerves grossly intact.  Mentation and speech appropriate for age.  PSYCH: Mentation appears normal, affect normal/bright, judgement and insight intact, normal speech and appearance well-groomed.    (F32.0) Current mild episode of major depressive disorder, unspecified whether recurrent (H24)  (primary encounter diagnosis)  Comment: Mindy is going to add one bupropion 150 mg XL every other day to see if this is helpful.  She will follow up with a [employer] message to let me know how she is doing.    (F90.0) ADHD (attention deficit hyperactivity disorder), inattentive type    Plan: buPROPion (WELLBUTRIN XL) 150 MG 24 hr tablet  As above    (U07.1) Infection due to 2019 novel coronavirus  Comment: Mindy will stay the course for now.  She will let me know if there is anything she may need from me.        Video-Visit Details    Type of service:  Video Visit   Video Start Time: 1000  Video End Time:1025    Originating Location (pt. Location): Home    Distant Location (provider location):  On-site  Platform used for Video Visit: [employer]

## 2023-12-13 ASSESSMENT — ACTIVITIES OF DAILY LIVING (ADL)
SITTING FOR 15 MINUTES: NO DIFFICULTY AT ALL
GETTING INTO AND OUT OF AN AVERAGE CAR: NO DIFFICULTY AT ALL
HOS_ADL_ITEM_SCORE_TOTAL: 57
LIGHT_TO_MODERATE_WORK: NO DIFFICULTY AT ALL
WALKING_UP_STEEP_HILLS: SLIGHT DIFFICULTY
HOS_ADL_HIGHEST_POTENTIAL_SCORE: 68
STEPPING UP AND DOWN CURBS: NO DIFFICULTY AT ALL
GOING DOWN 1 FLIGHT OF STAIRS: NO DIFFICULTY AT ALL
HOS_ADL_SCORE(%): 83.82
GETTING_INTO_AND_OUT_OF_A_BATHTUB: NO DIFFICULTY AT ALL
WALKING_INITIALLY: SLIGHT DIFFICULTY
DEEP SQUATTING: MODERATE DIFFICULTY
WALKING_15_MINUTES_OR_GREATER: SLIGHT DIFFICULTY
HEAVY_WORK: SLIGHT DIFFICULTY
ROLLING OVER IN BED: SLIGHT DIFFICULTY
RECREATIONAL ACTIVITIES: SLIGHT DIFFICULTY
WALKING_DOWN_STEEP_HILLS: SLIGHT DIFFICULTY
STANDING FOR 15 MINUTES: NO DIFFICULTY AT ALL
GOING UP 1 FLIGHT OF STAIRS: SLIGHT DIFFICULTY
WALKING_APPROXIMATELY_10_MINUTES: SLIGHT DIFFICULTY
TWISTING/PIVOTING ON INVOLVED LEG: NO DIFFICULTY AT ALL
PUTTING ON SOCKS AND SHOES: SLIGHT DIFFICULTY

## 2023-12-13 ASSESSMENT — PATIENT HEALTH QUESTIONNAIRE - PHQ9
10. IF YOU CHECKED OFF ANY PROBLEMS, HOW DIFFICULT HAVE THESE PROBLEMS MADE IT FOR YOU TO DO YOUR WORK, TAKE CARE OF THINGS AT HOME, OR GET ALONG WITH OTHER PEOPLE: NOT DIFFICULT AT ALL
SUM OF ALL RESPONSES TO PHQ QUESTIONS 1-9: 6
SUM OF ALL RESPONSES TO PHQ QUESTIONS 1-9: 6

## 2023-12-14 ENCOUNTER — VIRTUAL VISIT (OUTPATIENT)
Dept: PSYCHOLOGY | Facility: CLINIC | Age: 43
End: 2023-12-14
Payer: COMMERCIAL

## 2023-12-14 ENCOUNTER — THERAPY VISIT (OUTPATIENT)
Dept: PHYSICAL THERAPY | Facility: CLINIC | Age: 43
End: 2023-12-14
Attending: NURSE PRACTITIONER
Payer: COMMERCIAL

## 2023-12-14 DIAGNOSIS — M75.91 RIGHT SUPRASPINATUS TENDONITIS: Primary | ICD-10-CM

## 2023-12-14 DIAGNOSIS — F41.8 OTHER SPECIFIED ANXIETY DISORDERS: ICD-10-CM

## 2023-12-14 DIAGNOSIS — F33.0 MILD EPISODE OF RECURRENT MAJOR DEPRESSIVE DISORDER (H): ICD-10-CM

## 2023-12-14 DIAGNOSIS — F90.2 ATTENTION DEFICIT HYPERACTIVITY DISORDER (ADHD), COMBINED TYPE: Primary | ICD-10-CM

## 2023-12-14 PROCEDURE — 97110 THERAPEUTIC EXERCISES: CPT | Mod: GP | Performed by: PHYSICAL THERAPIST

## 2023-12-14 PROCEDURE — 97161 PT EVAL LOW COMPLEX 20 MIN: CPT | Mod: GP | Performed by: PHYSICAL THERAPIST

## 2023-12-14 PROCEDURE — 90837 PSYTX W PT 60 MINUTES: CPT | Mod: VID

## 2023-12-14 ASSESSMENT — COLUMBIA-SUICIDE SEVERITY RATING SCALE - C-SSRS
SUICIDE, SINCE LAST CONTACT: NO
1. SINCE LAST CONTACT, HAVE YOU WISHED YOU WERE DEAD OR WISHED YOU COULD GO TO SLEEP AND NOT WAKE UP?: NO
TOTAL  NUMBER OF INTERRUPTED ATTEMPTS SINCE LAST CONTACT: NO
6. HAVE YOU EVER DONE ANYTHING, STARTED TO DO ANYTHING, OR PREPARED TO DO ANYTHING TO END YOUR LIFE?: NO
TOTAL  NUMBER OF ABORTED OR SELF INTERRUPTED ATTEMPTS SINCE LAST CONTACT: NO
ATTEMPT SINCE LAST CONTACT: NO
2. HAVE YOU ACTUALLY HAD ANY THOUGHTS OF KILLING YOURSELF?: NO

## 2023-12-14 ASSESSMENT — ANXIETY QUESTIONNAIRES
2. NOT BEING ABLE TO STOP OR CONTROL WORRYING: NOT AT ALL
6. BECOMING EASILY ANNOYED OR IRRITABLE: SEVERAL DAYS
7. FEELING AFRAID AS IF SOMETHING AWFUL MIGHT HAPPEN: SEVERAL DAYS
GAD7 TOTAL SCORE: 5
1. FEELING NERVOUS, ANXIOUS, OR ON EDGE: NOT AT ALL
5. BEING SO RESTLESS THAT IT IS HARD TO SIT STILL: SEVERAL DAYS
IF YOU CHECKED OFF ANY PROBLEMS ON THIS QUESTIONNAIRE, HOW DIFFICULT HAVE THESE PROBLEMS MADE IT FOR YOU TO DO YOUR WORK, TAKE CARE OF THINGS AT HOME, OR GET ALONG WITH OTHER PEOPLE: SOMEWHAT DIFFICULT
GAD7 TOTAL SCORE: 5
3. WORRYING TOO MUCH ABOUT DIFFERENT THINGS: SEVERAL DAYS
4. TROUBLE RELAXING: SEVERAL DAYS

## 2023-12-14 NOTE — PROGRESS NOTES
[facility]                                                                                   OUTPATIENT PHYSICAL THERAPY    PLAN OF TREATMENT FOR OUTPATIENT REHABILITATION   Patient's Last Name, First Name, Vidhi Ma YOB: 1980   Provider's Name   [facility]   Medical Record No.  [id number]     Onset Date: 12/14/23  Start of Care Date: 12/14/23     Medical Diagnosis:  Acute pain of right shoulder      PT Treatment Diagnosis:  r supraspinous tendonitis Plan of Treatment  Frequency/Duration: 1x/week/ 7 weeks    Certification date from 12/14/23 to 02/01/24         See note for plan of treatment details and functional goals     Cate Garcia, PT                          12/14/23 0500   Appointment Info   Signing clinician's name / credentials Cate Garcia PTT   Total/Authorized Visits 6   Visits Used 1   Medical Diagnosis Acute pain of right shoulder   PT Tx Diagnosis r supraspinous tendonitis   Other pertinent information GOAL: SLeep   Quick Adds Certification   Progress Note/Certification   Start of Care Date 12/14/23   Onset of illness/injury or Date of Surgery 12/14/23   Therapy Frequency 1x/week   Predicted Duration 7 weeks   Certification date from 12/14/23   Certification date to 02/01/24   PT Goal 1   Goal Identifier Sleeping   Goal Description Sleep 6-8 hours per night 0/10, presently loss of 1-2 hours 7/10   Rationale   (get restorative sleep)   Target Date 02/01/24   Subjective Report   Subjective Report SEE IE   Treatment Interventions (PT)   Interventions Neuromuscular Re-education;Therapeutic Procedure/Exercise;Therapeutic Activity   Therapeutic Procedure/Exercise   PTRx Ther Proc 1 Upper Trapezius Stretch   PTRx Ther Proc 1 - Details 3 with 15 second hold each side - pull with hand for increased stretch -can do on other side   PTRx Ther Proc 2 Levator Scapulae Stretch   PTRx Ther Proc 2 - Details 3 with  15 second hold each side - pull with hand for increased stretch -can do on other side   PTRx Ther Proc 3 Pendulum/Codmans   PTRx Ther Proc 3 - Details  20 x clockwise  and  counter clockwise forward & backward face table and swing arm side to side with use of body momentum     Therapeutic Procedures: strength, endurance, ROM, flexibillity minutes (56207) 8   Eval/Assessments   PT Eval, Low Complexity Minutes (61383) 25   Education   Learner/Method Patient   Education Comments pt inst in hep   Plan   Home program see ptrx   Total Session Time   Timed Code Treatment Minutes 8   Total Treatment Time (sum of timed and untimed services) 33       I CERTIFY THE NEED FOR THESE SERVICES FURNISHED UNDER        THIS PLAN OF TREATMENT AND WHILE UNDER MY CARE     (Physician attestation of this document indicates review and certification of the therapy plan).              Referring Provider:  Mary Schwartz    Initial Assessment  See Epic Evaluation- Start of Care Date: 12/14/23

## 2023-12-14 NOTE — PROGRESS NOTES
PHYSICAL THERAPY EVALUATION  Type of Visit: Evaluation    See electronic medical record for Abuse and Falls Screening details.    Subjective       Presenting condition or subjective complaint: Injury history: Septemver 15, 2023, insidous onset, new problem. Currently, anterior right shoulder pain ( intermittent) , radiation to deltoid tuberostiy, pain level 7/10- sharp , better: nothing, worse: buckling seat belt, transitioning in bed, pulling up covers  Date of onset: 12/14/23    Relevant medical history: -- (unremarkable)   Dates & types of surgery: None    Prior diagnostic imaging/testing results: X-ray     Prior therapy history for the same diagnosis, illness or injury: No      Prior Level of Function  Transfers: na  Ambulation: na  ADL: na  IADL:  Sleeping     Living Environment  Social support: Alone   Type of home: Apartment/condo   Stairs to enter the home: No       Ramp: No   Stairs inside the home: No       Help at home: None  Equipment owned:       Employment: Yes [employer]  Hobbies/Interests: Sauna, cold plunge, walkong, reading, napping    Patient goals for therapy: Use my leg and arm without thinking about it.    Pain assessment:   see above     Objective   SHOULDER EVALUATION  PAIN:  see subjective  INTEGUMENTARY (edema, incisions):  na  POSTURE:   GAIT:   Weightbearing Status: na  Assistive Device(s): None  Gait Deviations: na  BALANCE/PROPRIOCEPTION: na  WEIGHTBEARING ALIGNMENT: na  ROM:   (Degrees) Left AROM Left PROM Right AROM  Right PROM   Shoulder Flexion 170  170    Shoulder Extension wnl      Shoulder Abduction  wnl  30 with pain     Shoulder Adduction       Shoulder Internal Rotation wnl  wnl    Shoulder External Rotation 110  110    Shoulder Horizontal Abduction       Shoulder Horizontal Adduction       Shoulder Flexion ER       Shoulder Flexion IR       Elbow Extension wnl  wnl    Elbow Flexion wnl  wnl    Pain:   End feel:     STRENGTH:   Pain: - none + mild ++ moderate +++  severe  Strength Scale: 0-5/5 Left Right   Shoulder Flexion 5 5   Shoulder Extension 5 5   Shoulder Abduction 5 3+, ++ (mod)   Shoulder Adduction     Shoulder Internal Rotation 5 5   Shoulder External Rotation 5 4+   Shoulder Horizontal Abduction 4    Shoulder Horizontal Adduction 5- 4+   Elbow Flexion 5 5   Elbow Extension     Mid Trap     Lower Trap     Rhomboid 5 Not assessed to pain   Serratus Anterior       FLEXIBILITY: na  SPECIAL TESTS: na  PALPATION: moderate right UT & levator scapulae, right bicipital groove, right supraspinatus insertion   JOINT MOBILITY:    Left Right   Glenohumeral anterior Hypermobile Hypermobile   Glenohumeral posterior WNL WNL   Glenohumeral inferior WNL WNL   Acromioclavicular WNL WNL   Scapulothoracic WNL WNL   Sternoclavicular WNL WNL   Scapulohumeral rhythm WNL Hypermobile     CERVICAL SCREEN:  wnl    Assessment & Plan   CLINICAL IMPRESSIONS  Medical Diagnosis: Acute pain of right shoulder    Treatment Diagnosis: r supraspinous tendonitis   Impression/Assessment: Patient is a 43 year old female with right shoulder complaints.  The following significant findings have been identified: Pain, Decreased ROM/flexibility, Decreased strength, and Decreased activity tolerance. These impairments interfere with their ability to perform  sleep  as compared to previous level of function.     Clinical Decision Making (Complexity):  Clinical Presentation: Stable/Uncomplicated  Clinical Presentation Rationale: based on medical and personal factors listed in PT evaluation  Clinical Decision Making (Complexity): Low complexity    PLAN OF CARE  Treatment Interventions:  Modalities: Cryotherapy  Interventions: Manual Therapy, Neuromuscular Re-education, Therapeutic Activity, Therapeutic Exercise, Self-Care/Home Management    Long Term Goals     PT Goal 1  Goal Identifier: Sleeping  Goal Description: Sleep 6-8 hours per night 0/10, presently loss of 1-2 hours 7/10  Rationale:  (get restorative  sleep)  Target Date: 02/01/24      Frequency of Treatment: 1x/week  Duration of Treatment: 7 weeks    Recommended Referrals to Other Professionals: none needed   Education Assessment:   Learner/Method: Patient  Education Comments: pt inst in hep    Risks and benefits of evaluation/treatment have been explained.   Patient/Family/caregiver agrees with Plan of Care.     Evaluation Time:     PT Eval, Low Complexity Minutes (14245): 25       Signing Clinician: Cate Garcia, PT      [facility]                                                                                   OUTPATIENT PHYSICAL THERAPY      PLAN OF TREATMENT FOR OUTPATIENT REHABILITATION   Patient's Last Name, First Name, Vidhi Ma YOB: 1980   Provider's Name   [facility]   Medical Record No.  [id number]     Onset Date: 12/14/23  Start of Care Date: 12/14/23     Medical Diagnosis:  Acute pain of right shoulder      PT Treatment Diagnosis:  r supraspinous tendonitis Plan of Treatment  Frequency/Duration: 1x/week/ 7 weeks    Certification date from 12/14/23 to 02/01/24         See note for plan of treatment details and functional goals     Cate Garcia, PT                         I CERTIFY THE NEED FOR THESE SERVICES FURNISHED UNDER        THIS PLAN OF TREATMENT AND WHILE UNDER MY CARE     (Physician attestation of this document indicates review and certification of the therapy plan).              Referring Provider:  Mary Schwartz    Initial Assessment  See Epic Evaluation- Start of Care Date: 12/14/23

## 2023-12-14 NOTE — PROGRESS NOTES
[facility]                                     Progress Note    Patient Name: Vidhi Tovar  Date: 12/14/2023       Service Type: Individual      Session Start Time: 12:31 PM  Session End Time: 1:27 PM     Session Length: 53+ min    Session #:  18    Attendees: Client attended alone    Service Modality:  Video Visit:      Provider verified identity through the following two step process.  Patient provided:  Patient is known previously to provider    Telemedicine Visit: The patient's condition can be safely assessed and treated via synchronous audio and visual telemedicine encounter.      Reason for Telemedicine Visit: Patient has requested telehealth visit    Originating Site (Patient Location): Patient's home    Distant Site (Provider Location): Provider Remote Setting- Home Office    Consent:  The patient/guardian has verbally consented to: the potential risks and benefits of telemedicine (video visit) versus in person care; bill my insurance or make self-payment for services provided; and responsibility for payment of non-covered services.     Patient would like the video invitation sent by:  My Chart    Mode of Communication:  Video Conference via [employer]    Distant Location (Provider):  Off-site    As the provider I attest to compliance with applicable laws and regulations related to telemedicine.    DATA  Extended Session (53+ minutes): PROLONGED SERVICE IN THE OUTPATIENT SETTING REQUIRING DIRECT (FACE-TO-FACE) PATIENT CONTACT BEYOND THE USUAL SERVICE:    - Patient's presenting concerns require more intensive intervention than could be completed within the usual service  Interactive Complexity: No  Crisis: No        Progress Since Last Session (Related to Symptoms / Goals / Homework):   Symptoms: Improving based on PHQ-9 and DIONNE-7 scores and patient self-report.    Homework: Partially completed      Episode of Care Goals: Satisfactory progress - ACTION (Actively working towards change);  "Intervened by reinforcing change plan / affirming steps taken     Current / Ongoing Stressors and Concerns:    Patient reports she is concerned about her parents and them aging. Patient reports she is concerned about \"the world\". Patient reports she is concerned about her interpersonal relationships. Patient reports she is concerned about organization skills. Patient reports she is concerned about how her ADHD impacts social relationships.Patient reports concerns about rejection sensitivity. Patient reports she is concerned about her brother and his family. Patient reports she is concerned about the role she plays in her family dynamic. Patient reports she is concerned about anger. Patient reports she is concerned about her sleep habits. Patient reports she is concerned about impulsivity. Patient reports she is concerned about an internal conflict.      Treatment Objective(s) Addressed in This Session:   Patient will increase interpersonal effectiveness skills with family and friends to help manage conflict.    Patient will build upon the authentic self.         Patient will bring to session interpersonal conflicts to process and explore.   Patient will practice identifying and communicating her needs to others.  Patient will learn about the diagnosis and symptoms of ADHD.      Intervention:   Motivational Interviewing: supported patient in processing and exploring an internal conflict.   Validated patient's emotions. Supported patient in identifying her needs in the situation. Discussed patient's inner child and what her inner child's needs are. Supported patient in exploring boundaries she has been setting with her family. Validated patient's use of skill. Reviewed effective communication skills and how to set and maintain boundaries. Role played a conversation between patient and her parents where she effectively sets boundaries. Discussed having empathy for her mom. Reviewed empathy as an understanding. "    Supported patient in exploring impulses. Reviewed dopamine seeking with ADHD.    Reviewed treatment plan and goals with patient.     Assessments completed prior to visit:  The following assessments were completed by patient for this visit:  PHQ9:       3/29/2023    10:20 AM 4/5/2023    10:27 AM 5/18/2023    10:00 AM 8/9/2023     9:17 AM 9/11/2023     9:25 AM 10/5/2023     1:00 PM 12/13/2023     2:22 PM   PHQ-9 SCORE   PHQ-9 Total Score MyChart 4 (Minimal depression) 4 (Minimal depression)  5 (Mild depression) 3 (Minimal depression)  6 (Mild depression)   PHQ-9 Total Score 4 4 3 5 3 8 6     GAD7:       2/1/2023     9:27 AM 2/15/2023     8:00 AM 3/22/2023    10:00 AM 5/18/2023    10:00 AM 8/9/2023     9:00 AM 10/5/2023     1:00 PM 12/14/2023    12:00 PM   DIONNE-7 SCORE   Total Score 0 (minimal anxiety)         Total Score 0 2 6 5 8 7 5     PROMIS 10-Global Health (only subscores and total score):       7/19/2022    11:49 AM 10/31/2022     2:19 PM 12/14/2022     8:31 AM 2/1/2023     9:29 AM 5/18/2023    10:35 AM 8/9/2023     9:33 AM 12/13/2023     2:23 PM   PROMIS-10 Scores Only   Global Mental Health Score 11 13 12 15 14 14 16   Global Physical Health Score 16 14 16 16 16 15 15   PROMIS TOTAL - SUBSCORES 27 27 28 31 30 29 31         ASSESSMENT: Current Emotional / Mental Status (status of significant symptoms):   Risk status (Self / Other harm or suicidal ideation)   Patient denies current fears or concerns for personal safety.   Patient denies current or recent suicidal ideation or behaviors.   Patient denies current or recent homicidal ideation or behaviors.   Patient denies current or recent self injurious behavior or ideation.   Patient denies other safety concerns.   Patient reports there has been no change in risk factors since their last session.     Patient reports there has been no change in protective factors since their last session.     Recommended that patient call 911 or go to the local ED should there  be a change in any of these risk factors.     Appearance:   Appropriate    Eye Contact:   Good    Psychomotor Behavior: Normal    Attitude:   Cooperative    Orientation:   All   Speech    Rate / Production: Hyperverbal     Volume:  Normal    Mood:    Anxious  Depressed  Sad    Affect:    Worrisome    Thought Content:  Rumination    Thought Form:  Coherent    Insight:    Good      Medication Review:   No current psychiatric medications prescribed     Medication Compliance:   Yes     Changes in Health Issues:   None reported     Chemical Use Review:   Substance Use: Problem use continues with no change since last session, Not addressed in session        Tobacco Use: No current tobacco use.      Diagnosis:  1. Attention deficit hyperactivity disorder (ADHD), combined type    2. Mild episode of recurrent major depressive disorder (H24)    3. Other specified anxiety disorders        Collateral Reports Completed:   Not Applicable    PLAN: (Patient Tasks / Therapist Tasks / Other)  Patient will practice having empathy for her mom. Patient will use her effective communication skills to set and maintain boundaries with her family. Patient will take breaks as needed to help regulate emotions. We will discuss next session.         Chaya Harmon, [facility]  12/14/2023  _____________________________________________________                                              Individual Treatment Plan    Patient's Name: Vidhi Tovar  YOB: 1980    Date of Creation: 11/9/2022  Date Treatment Plan Last Reviewed/Revised: 12/14/2023    DSM5 Diagnoses: Attention-Deficit/Hyperactivity Disorder  314.01 (F90.2) Combined presentation, 296.31 (F33.0) Major Depressive Disorder, Recurrent Episode, Mild _ or 300.09 (F41.8) Other Specified Anxiety Disorder   Psychosocial / Contextual Factors: Patient is self-employed. Patient is single. Patient is taking care of her elderly parents.   PROMIS (reviewed every 90 days):   The following  assessments were completed by patient for this visit:  PROMIS 10-Global Health (only subscores and total score):       7/19/2022    11:49 AM 10/31/2022     2:19 PM 12/14/2022     8:31 AM 2/1/2023     9:29 AM 5/18/2023    10:35 AM 8/9/2023     9:33 AM 12/13/2023     2:23 PM   PROMIS-10 Scores Only   Global Mental Health Score 11 13 12 15 14 14 16   Global Physical Health Score 16 14 16 16 16 15 15   PROMIS TOTAL - SUBSCORES 27 27 28 31 30 29 31       Referral / Collaboration:  Referral to another professional/service is not indicated at this time.    Anticipated number of session for this episode of care: 100  Anticipation frequency of session: Every other week  Anticipated Duration of each session: 53 or more minutes  Treatment plan will be reviewed in 90 days or when goals have been changed. There has been demonstrated improvement in functioning while patient has been engaged in psychotherapy/psychological service- if withdrawn the patient would deteriorate and/or relapse.       MeasurableTreatment Goal(s) related to diagnosis / functional impairment(s)  Goal 1:Patient will work on stabilizing symptoms of ADHD.      Objective #A  Patient will learn about the diagnosis and symptoms of ADHD.   Status: Continued - Date(s): 12/14/2023     Intervention(s)  Therapist will teach about the diagnosis of ADHD.     Objective #B  Patient will learn 3 skills to improve organization.                        Status: Continued - Date(s): 12/14/2023     Intervention(s)  Therapist will teach organizational skills.      Objective #C   Patient will learn how ADHD can impact interpersonal relationships and social skills.   Status: Continued - Date(s): 12/14/2023     Intervention(s)  Therapist will teach about the symptoms of ADHD and how a neuro-divergent brain tries to build social connections.        Goal 2: Client will work on stabilizing anxiety symptoms as evidenced by DIONNE-7 scores (current is 5) and Self report.  Goal is to  maintain this score.      Objective #A Client will identify triggers of anxiety and process them in session.    Status: Continued - Date(s): 12/14/2023    Intervention(s)  Therapist will teach emotional recognition/identification by assigning homework to journal with written exercises.    Objective #B  Client will identify initial signs or symptoms of anxiety.    Status: Completed - Date: 12/14/2023    Intervention(s)  Therapist will teach emotional regulation skills, such as deep breathing and mindfulness skills.    Objective #C  Client will learn about co-morbidities between ADHD and anxiety disorders.   Status: Continued - Date(s): 12/14/2023    Intervention(s)  Therapist will provide educational materials on anxiety disorders and ADHD.       Goal 3: Patient will increase effectiveness of interpersonal communication skills.      Objective #A  Patient will increase interpersonal effectiveness skills with family and friends to help manage conflict.    Status: Continued - Date(s): 12/14/2023     Intervention(s)  Therapist will role-play conflict management.     Objective #B  Patient will build upon the authentic self.         Status: Continued - Date(s): 12/14/2023     Intervention(s)  Therapist will assign homework through written exercises.     Objective #C  Patient will bring to session interpersonal conflicts to process and explore.   Status: Continued - Date(s): 12/14/2023     Intervention(s)  Therapist will teach about healthy boundaries related to interpersonal relationships.     Objective #D  Patient will practice identifying and communicating her needs to others.  Status: Continued - Date(s): 12/14/2023     Intervention(s)  Therapist will teach about effective communication skills and how to identify emotions.       Patient has reviewed and agreed to the above plan.      Chaya Harmon, LORENA   November 9, 2022 Reviewed 2/1/2023 Re-reviewed 5/18/2023 Re-reviewed 8/9/2023  Re-reviewed 12/14/2023

## 2023-12-21 ENCOUNTER — THERAPY VISIT (OUTPATIENT)
Dept: PHYSICAL THERAPY | Facility: CLINIC | Age: 43
End: 2023-12-21
Payer: COMMERCIAL

## 2023-12-21 DIAGNOSIS — M75.91 RIGHT SUPRASPINATUS TENDONITIS: Primary | ICD-10-CM

## 2023-12-21 PROCEDURE — 97140 MANUAL THERAPY 1/> REGIONS: CPT | Mod: GP | Performed by: PHYSICAL THERAPIST

## 2023-12-21 PROCEDURE — 97112 NEUROMUSCULAR REEDUCATION: CPT | Mod: GP | Performed by: PHYSICAL THERAPIST

## 2024-01-10 DIAGNOSIS — F90.0 ADHD (ATTENTION DEFICIT HYPERACTIVITY DISORDER), INATTENTIVE TYPE: ICD-10-CM

## 2024-01-10 RX ORDER — BUPROPION HYDROCHLORIDE 300 MG/1
300 TABLET ORAL EVERY MORNING
Qty: 90 TABLET | Refills: 1 | Status: SHIPPED | OUTPATIENT
Start: 2024-01-10 | End: 2024-07-03

## 2024-01-10 RX ORDER — BUPROPION HYDROCHLORIDE 150 MG/1
150 TABLET ORAL EVERY MORNING
Qty: 90 TABLET | Refills: 1 | Status: SHIPPED | OUTPATIENT
Start: 2024-01-10 | End: 2024-07-03

## 2024-02-15 ENCOUNTER — VIRTUAL VISIT (OUTPATIENT)
Dept: FAMILY MEDICINE | Facility: CLINIC | Age: 44
End: 2024-02-15
Payer: COMMERCIAL

## 2024-02-15 DIAGNOSIS — A09 TRAVELER'S DIARRHEA: Primary | ICD-10-CM

## 2024-02-15 ASSESSMENT — ENCOUNTER SYMPTOMS
HEMATOCHEZIA: 0
DIARRHEA: 1
FATIGUE: 1
FEVER: 0
VOMITING: 0
NAUSEA: 1
CHILLS: 0

## 2024-02-15 NOTE — PROGRESS NOTES
"Mindy is a 43 year old who is being evaluated via a billable video visit.      How would you like to obtain your AVS? MyChart  If the video visit is dropped, the invitation should be resent by: Text to cell phone: [phone]  Will anyone else be joining your video visit? No          Subjective   Mindy is a 43 year old, presenting for the following health issues:      HPI:      43-year-old female presents to video visit to discuss diarrhea.  Patient was in [place] this past Saturday where she developed diarrhea after eating a seared tuna for dinner.  Patient then became acutely ill for 6 hours on Sunday where she had vomiting and diarrhea.  She flew back to [place] on Monday where she reports minimal diarrhea as she did not have \"much left in her system.\"  She continued to have nausea without vomiting on Tuesday and Wednesday. Her diarrhea has persisted.  She thought her stools were becoming more solid yesterday but then her diarrhea came back including some incontinence.  She currently denies fevers or chills.  She endorses fatigue.  She denies blood or mucus in her stool or vomiting today.  She took Pepto and Imodium before her flight on Monday but is unsure how effective those medications were as she had not really had anything to eat prior to the flight. No other acute concerns/symptoms at time of exam.        Review of Systems   Constitutional:  Positive for fatigue. Negative for chills and fever.   Gastrointestinal:  Positive for diarrhea and nausea. Negative for hematochezia and vomiting.   Constitutional, HEENT, cardiovascular, pulmonary, gi and gu systems are negative, except as otherwise noted.      Past Medical History:   Diagnosis Date    ADHD (attention deficit hyperactivity disorder)     Depressive disorder     Fear of flying     Herpes simplex virus infection     Rosacea     Vitamin D deficiency     Vitiligo        History reviewed. No pertinent surgical history.    Family History   Problem Relation " Laceration Repair    Date/Time: 11/10/2022 8:00 AM  Performed by: Shelby Hope PA-C  Authorized by: Shelby Hope PA-C   Consent Done: Yes  Body area: upper extremity  Location details: left hand  Laceration length: 2.5 cm  Foreign bodies: no foreign bodies  Irrigation solution: saline  Irrigation method: syringe  Amount of cleaning: extensive  Skin closure: Steri-Strips  Dressing: non-stick sterile dressing and antibiotic ointment  Patient tolerance: Patient tolerated the procedure well with no immediate complications       Age of Onset    Hypertension Father     Substance Abuse Maternal Grandmother     Diabetes Maternal Grandmother     Cancer Maternal Grandfather     Cancer Paternal Grandmother     Substance Abuse Paternal Grandfather     Aortic dissection Paternal Grandfather     Depression Brother     Melanoma No family hx of     Skin Cancer No family hx of        Social History     Tobacco Use    Smoking status: Former    Smokeless tobacco: Never   Substance Use Topics    Alcohol use: Not Currently     Comment: former     Current Outpatient Medications   Medication    ALPRAZolam (XANAX) 0.25 MG tablet    buPROPion (WELLBUTRIN XL) 150 MG 24 hr tablet    buPROPion (WELLBUTRIN XL) 300 MG 24 hr tablet    Probiotic Product (UP4 PROBIOTICS PO)    valACYclovir (VALTREX) 1000 mg tablet    hydrocortisone (ANUSOL-HC) 25 MG suppository     No current facility-administered medications for this visit.             Objective           Vitals:  No vitals were obtained today due to virtual visit.    Physical Exam   GENERAL: alert and no distress  EYES: Eyes grossly normal to inspection.  No discharge or erythema, or obvious scleral/conjunctival abnormalities.  RESP: No audible wheeze, cough, or visible cyanosis.    SKIN: Visible skin clear. No significant rash, abnormal pigmentation or lesions.  NEURO: Cranial nerves grossly intact.  Mentation and speech appropriate for age.  PSYCH: Appropriate affect, tone, and pace of words        Assessment/Plan  1. Traveler's diarrhea    43-year-old female presents to video visit to discuss persistent diarrhea after traveling to [place] and returning on Monday of this week.  Her vomiting has resolved, however she still reports diarrhea, nausea, and fatigue.  In the absence of fevers, chills, hematochezia, or mucus in her stool, advised patient that we could try supportive measures including hydration, over-the-counter Pepto and Imodium as needed and see how she progresses versus starting empiric therapy with  azithromycin today.  After discussion, patient opted for supportive care first with a plan to follow-up tomorrow should her symptoms not improve.  If her symptoms do not improve, will start empiric azithromycin.      Follow-up tomorrow via [employer] message if symptoms do not improve.          Video-Visit Details    Type of service:  Video Visit   Video Start Time:  10:46am  Video End Time: 1057am    Originating Location (pt. Location): Home    Distant Location (provider location):  On-site  Platform used for Video Visit: [employer]oona      All questions/concerns addressed. Patient stated understanding/agreement to plan of care.    MELA Jackson, CNP  [facility] [employer]    Note: Chart documentation was done in part with Dragon Voice Recognition software.  Although reviewed after completion, some word and grammatical errors may remain. Please contact author for any clarification or concerns.

## 2024-02-20 ENCOUNTER — TELEPHONE (OUTPATIENT)
Dept: GASTROENTEROLOGY | Facility: CLINIC | Age: 44
End: 2024-02-20
Payer: COMMERCIAL

## 2024-02-20 NOTE — TELEPHONE ENCOUNTER
Writer received a message from Leydi Powell to help get Pt scheduled for a consultation as a new esophageal Pt after scheduled procedure on 3/19/2024. Writer called and talked with Pt. Pt accepted a virtual visit with Leydi Powell on 3/26/2024 at 8 AM.

## 2024-02-26 ENCOUNTER — THERAPY VISIT (OUTPATIENT)
Dept: PHYSICAL THERAPY | Facility: CLINIC | Age: 44
End: 2024-02-26
Payer: COMMERCIAL

## 2024-02-26 DIAGNOSIS — M75.91 RIGHT SUPRASPINATUS TENDONITIS: Primary | ICD-10-CM

## 2024-02-26 PROCEDURE — 97112 NEUROMUSCULAR REEDUCATION: CPT | Mod: GP | Performed by: PHYSICAL THERAPIST

## 2024-02-26 PROCEDURE — 97140 MANUAL THERAPY 1/> REGIONS: CPT | Mod: GP | Performed by: PHYSICAL THERAPIST

## 2024-02-26 NOTE — PROGRESS NOTES
PROGRESS  REPORT    Progress reporting period is from 12- to 2-.       SUBJECTIVE  Subjective changes noted by patient:   patient has only been seen  2 times in physical therapy and exercise program is helping. Sleeping less painful and 5/10 with 1 one loss of sleep at night        Current pain level is 4/10 -5/10  .     Previous pain level was  7/10  .   Changes in function:  Yes (See Goal flowsheet attached for changes in current functional level)  Adverse reaction to treatment or activity: None    OBJECTIVE  Changes noted in objective findings:  Yes,       ROM:   (Degrees) Left AROM Left PROM Right AROM  Right PROM   Shoulder Flexion 170   170     Shoulder Extension wnl         Shoulder Abduction  wnl   30 with pain and then to 170     Shoulder Adduction           Shoulder Internal Rotation wnl   wnl     Shoulder External Rotation 110   110     Shoulder Horizontal Abduction           Shoulder Horizontal Adduction           Shoulder Flexion ER           Shoulder Flexion IR           Elbow Extension wnl   wnl     Elbow Flexion wnl   wnl     Pain:   End feel:      STRENGTH:   Pain: - none + mild ++ moderate +++ severe  Strength Scale: 0-5/5 Left Right   Shoulder Flexion 5 5   Shoulder Extension 5 5   Shoulder Abduction 5 4-++ (mod)   Shoulder Adduction       Shoulder Internal Rotation 5 5   Shoulder External Rotation 5 4+/5-5-/5   Shoulder Horizontal Abduction 4     Shoulder Horizontal Adduction 5- 4+/5-5/5   Elbow Flexion 5 5   Elbow Extension       Mid Trap       Lower Trap       Rhomboid 5 Not assessed to pain   Serratus Anterior          FLEXIBILITY: na  SPECIAL TESTS: na  PALPATION: moderate right UT & levator scapulae, right bicipital groove, right supraspinatus insertion   JOINT MOBILITY:     Left Right   Glenohumeral anterior Hypermobile Hypermobile   Glenohumeral posterior WNL WNL   Glenohumeral inferior WNL WNL   Acromioclavicular WNL WNL   Scapulothoracic WNL WNL   Sternoclavicular WNL WNL    Scapulohumeral rhythm WNL Hypermobile      CERVICAL SCREEN:  wnl           Assessment & Plan   CLINICAL IMPRESSIONS  Medical Diagnosis: Acute pain of right shoulder    Treatment Diagnosis: r supraspinous tendonitis   Impression/Assessment: Patient is a 43 year old female with right shoulder complaints.  The following significant findings have been identified: Pain, Decreased ROM/flexibility, Decreased strength, and Decreased activity tolerance. These impairments interfere with their ability to perform  sleep  as compared to previous level of function.      Clinical Decision Making (Complexity):  Clinical Presentation: Stable/Uncomplicated  Clinical Presentation Rationale: based on medical and personal factors listed in PT evaluation  Clinical Decision Making (Complexity): Low complexity     PLAN OF CARE  Treatment Interventions:  Modalities: Cryotherapy  Interventions: Manual Therapy, Neuromuscular Re-education, Therapeutic Activity, Therapeutic Exercise, Self-Care/Home Management     Long Term Goals     PT Goal 1  Goal Identifier: Sleeping  Goal Description: Sleep 6-8 hours per night 0/10, presently loss of 1-2 hours 7/10  Rationale:  (get restorative sleep)  Target Date: 02/01/24        Frequency of Treatment: 1x/week  Duration of Treatment: 7 weeks     Recommended Referrals to Other Professionals: none needed   Education Assessment:   Learner/Method: Patient  Education Comments: pt inst in hep     Risks and benefits of evaluation/treatment have been explained.   Patient/Family/caregiver agrees with Plan of Care.      Evaluation Time:     PT Eval, Low Complexity Minutes (12476): 25        Signing Clinician: Cate Garcia PT        [facility]                                                                                   OUTPATIENT PHYSICAL THERAPY                                         ASSESSMENT/PLAN  Updated problem list and treatment plan: Diagnosis 1:  right acute  shoulder pain   Pain -  hot/cold therapy, manual therapy, self management, education, directional preference exercise, and home program  Decreased ROM/flexibility - manual therapy, therapeutic exercise, therapeutic activity, and home program  Decreased strength - therapeutic exercise, therapeutic activities, and home program  Impaired muscle performance - neuro re-education and home program  Decreased function - therapeutic activities and home program  STG/LTGs have been met or progress has been made towards goals:  Yes (See Goal flow sheet completed today.)  Assessment of Progress: The patient's condition is improving.  Self Management Plans:  Patient has been instructed in a home treatment program.  Patient  has been instructed in self management of symptoms.  I have re-evaluated this patient and find that the nature, scope, duration and intensity of the therapy is appropriate for the medical condition of the patient.  Vidhi continues to require the following intervention to meet STG and LTG's:  PT    Recommendations:  This patient would benefit from continued therapy.     Frequency:  1 X week, once daily  Duration:  for 9 weeks    Please refer to the daily flowsheet for treatment today, total treatment time and time spent performing 1:1 timed codes.

## 2024-02-27 NOTE — PROGRESS NOTES
[facility]                                                                                   OUTPATIENT PHYSICAL THERAPY    PLAN OF TREATMENT FOR OUTPATIENT REHABILITATION   Patient's Last Name, First Name, Vidhi Ma YOB: 1980   Provider's Name   [facility]   Medical Record No.  [id number]     Onset Date: 12/14/23  Start of Care Date: 12/14/23     Medical Diagnosis:  Acute pain of right shoulder      PT Treatment Diagnosis:  r supraspinous tendonitis Plan of Treatment  Frequency/Duration: 1x/week/ 7 weeks    Certification date from 02/26/24 to 04/29/24         See note for plan of treatment details and functional goals     Cate Garcia, PT                          02/26/24 0500   Appointment Info   Signing clinician's name / credentials Cate Garcia PT   Total/Authorized Visits 6   Visits Used 3   Medical Diagnosis Acute pain of right shoulder   PT Tx Diagnosis r supraspinous tendonitis   Other pertinent information GOAL: SLeep   Quick Adds Certification   Progress Note/Certification   Start of Care Date 12/14/23   Onset of illness/injury or Date of Surgery 12/14/23   Therapy Frequency 1x/week   Predicted Duration 7 weeks   Certification date from 02/26/24   Certification date to 04/29/24   PT Goal 1   Goal Identifier Sleeping   Goal Description Sleep 6-8 hours per night 0/10, presently loss of 1-2 hours 7/10   Rationale   (get restorative sleep)   Goal Progress loss 1 hour per night  5/10   Target Date 04/29/24   Subjective Report   Subjective Report SEE ND   Treatment Interventions (PT)   Interventions Neuromuscular Re-education;Therapeutic Procedure/Exercise;Therapeutic Activity;Manual Therapy   Therapeutic Procedure/Exercise   PTRx Ther Proc 4 Pendulum/Codmans   PTRx Ther Proc 4 - Details  20 x clockwise  and  counter clockwise forward & backward face table and swing arm side to side with use of body momentum      Neuromuscular Re-education   Neuromuscular re-ed of mvmt, balance, coord, kinesthetic sense, posture, proprioception minutes ([id number]) 13   PTRx Neuro Re-ed 1 Shoulder Scapular Retraction with Tubing   PTRx Neuro Re-ed 1 - Details 2 set 10 reps with  yellow tubing- palms in and look straight ahead  abds tight no rotation of hands and movement occurring at elbow   PTRx Neuro Re-ed 2 Prone Arm Raise #1   PTRx Neuro Re-ed 2 - Details 2 set 10 reps at side with  2 #  and at 30 degrees 0# ( standing)    Skilled Intervention to improve scapular stability and control   Patient Response/Progress pt tolerated treatement well   PTRx Neuro Re-ed 3 bent over row   PTRx Neuro Re-ed 3 - Details 2/10 2#   Neuro Re-ed 1 Shoulder Theraband Internal Rotation   Neuro Re-ed 1 - Details 2 sets 10 green tubing   Manual Therapy   Manual Therapy: Mobilization, MFR, MLD, friction massage minutes (68038) 27   Manual Therapy Manual Therapy 3;Manual Therapy 4   Manual Therapy 1 levator scapule, UT stm prone x 15 minutes   Manual Therapy 3 scapula mobs: grade 3-4 8 min   Manual Therapy 4 thoracic rotational mobs: grade 3-4 2 min each side T3-T10   Skilled Intervention to improve shoulder girdle mechanics and muscle balance   Patient Response/Progress pt tolerated weell   Education   Learner/Method Patient   Education Comments pt's program progressed   Plan   Home program continue to perform ex daily   Updates to plan of care see ptrx   Total Session Time   Timed Code Treatment Minutes 40   Total Treatment Time (sum of timed and untimed services) 40         Referring Provider:  Mary Schwartz    Initial Assessment  See Epic Evaluation- Start of Care Date: 12/14/23

## 2024-03-04 ENCOUNTER — THERAPY VISIT (OUTPATIENT)
Dept: PHYSICAL THERAPY | Facility: CLINIC | Age: 44
End: 2024-03-04
Payer: COMMERCIAL

## 2024-03-04 DIAGNOSIS — M75.91 RIGHT SUPRASPINATUS TENDONITIS: Primary | ICD-10-CM

## 2024-03-04 PROCEDURE — 97140 MANUAL THERAPY 1/> REGIONS: CPT | Mod: GP | Performed by: PHYSICAL THERAPIST

## 2024-03-04 PROCEDURE — 97530 THERAPEUTIC ACTIVITIES: CPT | Mod: GP | Performed by: PHYSICAL THERAPIST

## 2024-03-07 ENCOUNTER — TELEPHONE (OUTPATIENT)
Dept: GASTROENTEROLOGY | Facility: CLINIC | Age: 44
End: 2024-03-07
Payer: COMMERCIAL

## 2024-03-07 NOTE — TELEPHONE ENCOUNTER
Pre assessment completed for upcoming procedure.      Procedure details:    Patient scheduled for Colonoscopy/Upper endoscopy (EGD) on 3/19/24.     Arrival time: 0830. Procedure time 0930    Pre op exam needed? N/A    Facility location: [facility]; [address] [place] [address], [address], [place] [address]    Sedation type: Conscious sedation     Indication for procedure:   Rectal bleeding [K62.5]  - Primary      Esophageal spasm           Designated  policy reviewed. Instructed to have someone stay 6 hours post procedure.       Chart review:     Electronic implanted devices? No    Recent diagnosis of diverticulitis within the last 6 weeks?  No    Diabetic? No    Diabetic medication HOLDING recommendations: (if applicable)  Oral diabetic medications: No  Diabetic injectables: No  Insulin: No    Medication review:    Anticoagulants? No    NSAIDS? No    Other medication HOLDING recommendations:  N/A      Prep for procedure:     Bowel prep recommendation: Standard Miralax  Due to: standard bowel prep.    Prep instructions sent via TouchFrame     Reviewed procedure prep instructions.     Patient verbalized understanding and had no questions or concerns at this time.        Concepcion Leija RN  Endoscopy Procedure Pre Assessment RN  [phone] option 4

## 2024-03-11 NOTE — TELEPHONE ENCOUNTER
REFERRAL INFORMATION:  Referring Provider:  Mary Schwartz NP  Referring Clinic:  [facility]   Reason for Visit/Diagnosis: K22.4 (ICD-10-CM) - Esophageal spasm     FUTURE VISIT INFORMATION:  Appointment Date: 3/26/24  Appointment Time: 8:00 AM      NOTES STATUS DETAILS   OFFICE NOTE from Referring Provider Internal 11/8/23 - PCC OV with Levi Schwartz NP    OFFICE NOTE from Other Specialist Internal 11/9/23 - E-Consult with Osbaldo Saunders MD at [facility]    MEDICATION LIST Internal         ENDOSCOPY  Internal  [facility]:  3/19/24 - EGD   COLONOSCOPY Internal [facility]:   3/19/24   PATHOLOGY REPORTS (RELATED) Internal 3/19/24 EGD & Colon bx

## 2024-03-19 ENCOUNTER — HOSPITAL ENCOUNTER (OUTPATIENT)
Facility: AMBULATORY SURGERY CENTER | Age: 44
Discharge: HOME | End: 2024-03-19
Attending: INTERNAL MEDICINE | Admitting: INTERNAL MEDICINE
Payer: COMMERCIAL

## 2024-03-19 VITALS
HEART RATE: 57 BPM | BODY MASS INDEX: 28.25 KG/M2 | RESPIRATION RATE: 12 BRPM | OXYGEN SATURATION: 98 % | DIASTOLIC BLOOD PRESSURE: 62 MMHG | WEIGHT: 180 LBS | SYSTOLIC BLOOD PRESSURE: 120 MMHG | HEIGHT: 67 IN | TEMPERATURE: 97 F

## 2024-03-19 DIAGNOSIS — K22.2 SCHATZKI'S RING: ICD-10-CM

## 2024-03-19 DIAGNOSIS — K21.9 GERD WITHOUT ESOPHAGITIS: Primary | ICD-10-CM

## 2024-03-19 LAB
COLONOSCOPY: NORMAL
HCG UR QL: NEGATIVE
INTERNAL QC OK POCT: NORMAL
POCT KIT EXPIRATION DATE: NORMAL
POCT KIT LOT NUMBER: NORMAL
UPPER GI ENDOSCOPY: NORMAL

## 2024-03-19 PROCEDURE — 88342 IMHCHEM/IMCYTCHM 1ST ANTB: CPT | Mod: TC | Performed by: INTERNAL MEDICINE

## 2024-03-19 PROCEDURE — 81025 URINE PREGNANCY TEST: CPT | Performed by: PATHOLOGY

## 2024-03-19 PROCEDURE — 88342 IMHCHEM/IMCYTCHM 1ST ANTB: CPT | Mod: 26 | Performed by: PATHOLOGY

## 2024-03-19 PROCEDURE — 88305 TISSUE EXAM BY PATHOLOGIST: CPT | Mod: 26 | Performed by: PATHOLOGY

## 2024-03-19 PROCEDURE — 43239 EGD BIOPSY SINGLE/MULTIPLE: CPT | Mod: 59 | Performed by: INTERNAL MEDICINE

## 2024-03-19 PROCEDURE — 45378 DIAGNOSTIC COLONOSCOPY: CPT | Performed by: INTERNAL MEDICINE

## 2024-03-19 PROCEDURE — 43249 ESOPH EGD DILATION <30 MM: CPT | Performed by: INTERNAL MEDICINE

## 2024-03-19 RX ORDER — FLUMAZENIL 0.1 MG/ML
0.2 INJECTION, SOLUTION INTRAVENOUS
Status: CANCELLED | OUTPATIENT
Start: 2024-03-19 | End: 2024-03-19

## 2024-03-19 RX ORDER — NALOXONE HYDROCHLORIDE 0.4 MG/ML
0.4 INJECTION, SOLUTION INTRAMUSCULAR; INTRAVENOUS; SUBCUTANEOUS
Status: CANCELLED | OUTPATIENT
Start: 2024-03-19

## 2024-03-19 RX ORDER — ONDANSETRON 2 MG/ML
4 INJECTION INTRAMUSCULAR; INTRAVENOUS EVERY 6 HOURS PRN
Status: CANCELLED | OUTPATIENT
Start: 2024-03-19

## 2024-03-19 RX ORDER — ONDANSETRON 2 MG/ML
4 INJECTION INTRAMUSCULAR; INTRAVENOUS
Status: DISCONTINUED | OUTPATIENT
Start: 2024-03-19 | End: 2024-03-20 | Stop reason: HOSPADM

## 2024-03-19 RX ORDER — NALOXONE HYDROCHLORIDE 0.4 MG/ML
0.2 INJECTION, SOLUTION INTRAMUSCULAR; INTRAVENOUS; SUBCUTANEOUS
Status: CANCELLED | OUTPATIENT
Start: 2024-03-19

## 2024-03-19 RX ORDER — FENTANYL CITRATE 50 UG/ML
INJECTION, SOLUTION INTRAMUSCULAR; INTRAVENOUS PRN
Status: DISCONTINUED | OUTPATIENT
Start: 2024-03-19 | End: 2024-03-19 | Stop reason: HOSPADM

## 2024-03-19 RX ORDER — PROCHLORPERAZINE MALEATE 10 MG
10 TABLET ORAL EVERY 6 HOURS PRN
Status: CANCELLED | OUTPATIENT
Start: 2024-03-19

## 2024-03-19 RX ORDER — ONDANSETRON 4 MG/1
4 TABLET, ORALLY DISINTEGRATING ORAL EVERY 6 HOURS PRN
Status: CANCELLED | OUTPATIENT
Start: 2024-03-19

## 2024-03-19 RX ORDER — OMEPRAZOLE 40 MG/1
40 CAPSULE, DELAYED RELEASE ORAL DAILY
Qty: 60 CAPSULE | Refills: 1 | Status: SHIPPED | OUTPATIENT
Start: 2024-03-19

## 2024-03-19 RX ORDER — SODIUM CHLORIDE, SODIUM LACTATE, POTASSIUM CHLORIDE, CALCIUM CHLORIDE 600; 310; 30; 20 MG/100ML; MG/100ML; MG/100ML; MG/100ML
INJECTION, SOLUTION INTRAVENOUS CONTINUOUS
Status: DISCONTINUED | OUTPATIENT
Start: 2024-03-19 | End: 2024-03-20 | Stop reason: HOSPADM

## 2024-03-19 RX ORDER — DIPHENHYDRAMINE HYDROCHLORIDE 50 MG/ML
INJECTION INTRAMUSCULAR; INTRAVENOUS PRN
Status: DISCONTINUED | OUTPATIENT
Start: 2024-03-19 | End: 2024-03-19 | Stop reason: HOSPADM

## 2024-03-19 RX ORDER — LIDOCAINE 40 MG/G
CREAM TOPICAL
Status: DISCONTINUED | OUTPATIENT
Start: 2024-03-19 | End: 2024-03-20 | Stop reason: HOSPADM

## 2024-03-19 RX ADMIN — SODIUM CHLORIDE, SODIUM LACTATE, POTASSIUM CHLORIDE, CALCIUM CHLORIDE: 600; 310; 30; 20 INJECTION, SOLUTION INTRAVENOUS at 09:03

## 2024-03-19 NOTE — H&P
Vidhi Tovar  [id number]  female  43 year old      Reason for procedure/surgery: dysphagia, esophageal spasm, BRBPR    Patient Active Problem List   Diagnosis    ADHD (attention deficit hyperactivity disorder), inattentive type    Depression, unspecified depression type    Moderate episode of recurrent major depressive disorder (H)       Past Surgical History:  History reviewed. No pertinent surgical history.    Past Medical History:   Past Medical History:   Diagnosis Date    ADHD (attention deficit hyperactivity disorder)     Depressive disorder     Fear of flying     Herpes simplex virus infection     Rosacea     Vitamin D deficiency     Vitiligo        Social History:   Social History     Tobacco Use    Smoking status: Former    Smokeless tobacco: Never   Substance Use Topics    Alcohol use: Not Currently     Comment: former       Family History:   Family History   Problem Relation Age of Onset    Hypertension Father     Substance Abuse Maternal Grandmother     Diabetes Maternal Grandmother     Cancer Maternal Grandfather     Cancer Paternal Grandmother     Substance Abuse Paternal Grandfather     Aortic dissection Paternal Grandfather     Depression Brother     Melanoma No family hx of     Skin Cancer No family hx of        Allergies: No Known Allergies    Active Medications:   Current Outpatient Medications   Medication Sig Dispense Refill    ALPRAZolam (XANAX) 0.25 MG tablet Take 1 tablet (0.25 mg) by mouth 3 times daily as needed for anxiety 10 tablet 0    buPROPion (WELLBUTRIN XL) 150 MG 24 hr tablet Take 1 tablet (150 mg) by mouth every morning (Patient taking differently: Take 150 mg by mouth Every other day) 90 tablet 1    hydrocortisone (ANUSOL-HC) 25 MG suppository Place 1 suppository (25 mg) rectally 2 times daily 24 suppository 0    Probiotic Product (UP4 PROBIOTICS PO)       valACYclovir (VALTREX) 1000 mg tablet Take 1 tablet (1,000 mg) by mouth 2 times daily 20 tablet 0    buPROPion (WELLBUTRIN  "XL) 300 MG 24 hr tablet Take 1 tablet (300 mg) by mouth every morning (Patient taking differently: Take 300 mg by mouth Every other day) 90 tablet 1       Systemic Review:   CONSTITUTIONAL: NEGATIVE for fever, chills, change in weight  ENT/MOUTH: NEGATIVE for ear, mouth and throat problems  RESP: NEGATIVE for significant cough or SOB  CV: NEGATIVE for chest pain, palpitations or peripheral edema    Physical Examination:   Vital Signs: /75 (BP Location: Right arm)   Pulse 73   Temp 97.5  F (36.4  C) (Temporal)   Resp 18   Ht 1.702 m (5' 7\")   Wt 81.6 kg (180 lb)   SpO2 97%   BMI 28.19 kg/m    GENERAL: healthy, alert and no distress  NECK: no adenopathy, no asymmetry, masses, or scars  RESP: lungs clear to auscultation - no rales, rhonchi or wheezes  CV: regular rate and rhythm, normal S1 S2, no S3 or S4, no murmur, click or rub, no peripheral edema and peripheral pulses strong  ABDOMEN: soft, nontender, no hepatosplenomegaly, no masses and bowel sounds normal  MS: no gross musculoskeletal defects noted, no edema    ASA Classification: (I)  Normal healthy patient  Airway Exam: Mallampati Score: Class II (Complete visualization of uvula)    Plan: Appropriate to proceed as scheduled.      Osbaldo Saunders MD  3/19/2024    PCP:  Mary Schwartz    "

## 2024-03-20 ENCOUNTER — THERAPY VISIT (OUTPATIENT)
Dept: PHYSICAL THERAPY | Facility: CLINIC | Age: 44
End: 2024-03-20
Payer: COMMERCIAL

## 2024-03-20 DIAGNOSIS — M75.91 RIGHT SUPRASPINATUS TENDONITIS: Primary | ICD-10-CM

## 2024-03-20 PROCEDURE — 97140 MANUAL THERAPY 1/> REGIONS: CPT | Mod: GP | Performed by: PHYSICAL THERAPIST

## 2024-03-20 PROCEDURE — 97110 THERAPEUTIC EXERCISES: CPT | Mod: GP | Performed by: PHYSICAL THERAPIST

## 2024-03-21 LAB
PATH REPORT.ADDENDUM SPEC: NORMAL
PATH REPORT.COMMENTS IMP SPEC: NORMAL
PATH REPORT.COMMENTS IMP SPEC: NORMAL
PATH REPORT.FINAL DX SPEC: NORMAL
PATH REPORT.GROSS SPEC: NORMAL
PATH REPORT.MICROSCOPIC SPEC OTHER STN: NORMAL
PATH REPORT.RELEVANT HX SPEC: NORMAL
PHOTO IMAGE: NORMAL

## 2024-03-25 ENCOUNTER — FCC EXTENDED DOCUMENTATION (OUTPATIENT)
Dept: PSYCHOLOGY | Facility: CLINIC | Age: 44
End: 2024-03-25
Payer: COMMERCIAL

## 2024-03-25 NOTE — PROGRESS NOTES
Discharge Summary  Multiple Sessions    Client Name: Vidhi Tovar MRN#: [id number] YOB: 1980      Intake / Discharge Date: 3/25/2024      DSM5 Diagnoses: (Sustained by DSM5 Criteria Listed Above)  Diagnoses: Attention-Deficit/Hyperactivity Disorder  314.01 (F90.2) Combined presentation  296.31 (F33.0) Major Depressive Disorder, Recurrent Episode, Mild _  300.00 (F41.9) Unspecified Anxiety Disorder  Psychosocial & Contextual Factors: Patient lives independently. Patient owns her own business. Patient is employed full time.   PROMIS Score: The following assessments were completed by patient for this visit:  PROMIS 10-Global Health (only subscores and total score):       7/19/2022    11:49 AM 10/31/2022     2:19 PM 12/14/2022     8:31 AM 2/1/2023     9:29 AM 5/18/2023    10:35 AM 8/9/2023     9:33 AM 12/13/2023     2:23 PM   PROMIS-10 Scores Only   Global Mental Health Score 11 13 12 15 14 14 16   Global Physical Health Score 16 14 16 16 16 15 15   PROMIS TOTAL - SUBSCORES 27 27 28 31 30 29 31             Presenting Concern:  Symptoms of ADHD      Reason for Discharge:  Client did not return      Disposition at Time of Last Encounter:   Comments:   Friendly and engaged.      Risk Management:   Client denies a history of suicidal ideation, suicide attempts, self-injurious behavior, homicidal ideation, homicidal behavior, and and other safety concerns  Recommended that patient call 911 or go to the local ED should there be a change in any of these risk factors.      Referred To:  None. Patient can return at a later time, if desired.         Chaya Carreon, [facility]   3/25/2024

## 2024-03-26 ENCOUNTER — PRE VISIT (OUTPATIENT)
Dept: GASTROENTEROLOGY | Facility: CLINIC | Age: 44
End: 2024-03-26

## 2024-03-26 ENCOUNTER — VIRTUAL VISIT (OUTPATIENT)
Dept: GASTROENTEROLOGY | Facility: CLINIC | Age: 44
End: 2024-03-26
Payer: COMMERCIAL

## 2024-03-26 DIAGNOSIS — R13.19 ESOPHAGEAL DYSPHAGIA: Primary | ICD-10-CM

## 2024-03-26 DIAGNOSIS — R07.2 SUBSTERNAL CHEST PAIN: ICD-10-CM

## 2024-03-26 PROCEDURE — 99203 OFFICE O/P NEW LOW 30 MIN: CPT | Mod: 95 | Performed by: PHYSICIAN ASSISTANT

## 2024-03-26 NOTE — PROGRESS NOTES
Gastroenterology Visit for: Vidhi Tovar 1980   MRN: [id number]     Reason for Visit:  chief complaint    Referred by: No ref. provider found  /   Patient Care Team:  Mary Schwartz NP as PCP - General (Family Medicine)  Bibiana Cohcran, PhD LP as Assigned Behavioral Health Provider  Tracy Ryan NP as Nurse Practitioner (Primary Care - CC)  Clive Wilks MD as MD (Dermatology)  Venice Alejandra DNP as Assigned Neuroscience Provider  Clive Wilks MD as MD (Dermatology)  Clive Wilks MD as Assigned Surgical Provider  Mary Schwartz NP as Assigned PCP    History of Present Illness:   Vidhi Tovar is a 43 year old female with significant past medical history pertinent for ADHD, depression who is presenting as a new patient in consultation at the request of Dr. Saavedra ref. provider found with a chief complaint of substernal chest pain.    Onset of dysphagia/substernal chest pain symptoms 6-7 years prior described as mild. This is always associated with consumption of solids. Triggers include potatoes, rice and carrots. The sensation can occur at most 10 - 15 minutes. In the past year however there has been a change in the severity symptoms now resulting in forceful regurgitation.  The change has been in regards to severity rather than frequency.  She does not self induce emesis. Overall the sensation subsides with time.This is occurring 1-2 times per year. Denies coughing, choking and gagging.    Has yet to start the Omeprazole 40 mg once daily. Has noted a difference after dilation.  However questions if this is more of a psychological process knowing that she had the dilation rather than a true improvement in symptoms.    Seldomly does Mindy experience reflux.  Noting that she will have regurgitation occuring once every 2-3 months either with the consumption of sugar or with extreme fatigue. Notes with weight loss symptoms improve. The regurgitation is not related to the  swallowing difficulties/substrnal chest pain. Denies heartburn. Relieved with TUMS or OTC antacid.     Denies weight loss, nausea, emesis, dysphonia/hoarseness, chronic cough/throat clearing, abdominal pain, diarrhea, constipation (< 3 stools per week), incontinence of feces, melena, hematochezia and BRBR.      Denies use of ETOH and tobacco products.    Will use edible cannabinoid products once per week.     No additional recreational drug use.     Paternal Grandfather had esophageal perforation and .     No family history or GI related malignancy (esophageal, gastric, pancreatic, liver or colon).     ----------------------------------------------------------------------------------------------------------------------------------------------------------------------------------------------------------------------------------  2023 E Consult Dr. Saunders     Clinical Question/Purpose: MY CLINICAL QUESTION IS: Ms Tovar has had esophageal spasms for 4 years, off and on.  Recently they have escalated in frequency and severity and now has associated vomiting.  The pain is severe, it is now always associated with either rice, potatoes or carrots. She does not have difficulty swallowing.      Patient assessment and information reviewed:   1. Question of esophageal spasm? - discomfort in chest - now possibly related to eating food - has had to vomit (or regurgitate?) food  2. Rectal bleeding     To further evaluate symptoms recommend EGD. This will help ensure there is  no esophagitis or esophageal stricture of other pathology. We will arrange for esophageal clinic follow up to go over results of EGD and if symptoms warrant high resolution manometry could be considered     Rectal bleeding may be related to hemorrhoids but would recommend colonoscopy as she is almost due for colon cancer screening anyway.     Recommendations:   -please place adult GI  referral for EGD/colonoscopy   -we will arrange  esophageal clinic follow up          Esophageal Questionnaire(s)    BEDQ Questionnaire       No data to display                   No data to display                Eckardt Questionnaire       No data to display                Promis 10 Questionnaire      12/14/2022     8:31 AM 2/1/2023     9:29 AM 5/18/2023    10:35 AM 8/9/2023     9:33 AM 12/13/2023     2:23 PM   PROMIS 10 FLOWSHEET DATA   In general, would you say your health is: 3 3 3 3 4   In general, would you say your quality of life is: 4 5 5 5 5   In general, how would you rate your physical health? 3 3 3 3 3   In general, how would you rate your mental health, including your mood and your ability to think? 3 3 3 3 3   In general, how would you rate your satisfaction with your social activities and relationships? 2 3 3 3 4   In general, please rate how well you carry out your usual social activities and roles. (This includes activities at home, at work and in your community, and responsibilities as a parent, child, spouse, employee, friend, etc.) 4 4 5 4 4   To what extent are you able to carry out your everyday physical activities such as walking, climbing stairs, carrying groceries, or moving a chair? 5 5 5 5 5   In the past 7 days, how often have you been bothered by emotional problems such as feeling anxious, depressed, or irritable? 3 2 3 3 2   In the past 7 days, how would you rate your fatigue on average? 3 2 2 3 3   In the past 7 days, how would you rate your pain on average, where 0 means no pain, and 10 means worst imaginable pain? 0 1 2 1 3   Mental health question re-calculation - no clinical value 3 4 3 3 4   Physical health question re-calculation - no clinical value 3 4 4 3 3   Pain question re-calculation - no clinical value 5 4 4 4 4   Global Mental Health Score 12 15 14 14 16   Global Physical Health Score 16 16 16 15 15   PROMIS TOTAL - SUBSCORES 28 31 30 29 31           STUDIES & PROCEDURES:    EGD:     3/19/2024  Findings:        A mild  Schatzki ring was found at the gastroesophageal junction. A TTS        dilator was passed through the scope. Dilation with a 63-74-39-20-21 mm        balloon dilator was performed to 21 mm. The dilation site was examined        and showed moderate mucosal disruption, complete resolution of luminal        narrowing, no bleeding and no perforation.        The Z-line was regular and was found 37 cm from the incisors.        There was subtle scarring in the distal esophagus with subtle rings that        are most consistent with prior reflux esophagitis. The exam of the        esophagus was otherwise normal.        Biopsies were obtained from the proximal and distal esophagus with cold        forceps for histology of suspected eosinophilic esophagitis.        Striped mildly erythematous mucosa without bleeding was found in the        gastric body and in the gastric antrum. Biopsies were taken with a cold        forceps for histology.        The exam of the stomach was otherwise normal.        The duodenal bulb, first portion of the duodenum, second portion of the        duodenum and third portion of the duodenum were normal.      Impression:            - Mild Schatzki ring. Dilated.                          - Z-line regular, 37 cm from the incisors.                          - Erythematous mucosa in the gastric body and antrum.                          Biopsied.                          - Normal duodenal bulb, first portion of the duodenum,                          second portion of the duodenum and third portion of                          the duodenum.                          - Biopsies were taken with a cold forceps for                          evaluation of eosinophilic esophagitis.                          I suspect her esophageal spasm symptoms are actually                          due to dysphagia. She does have a mild schatzki ring                          and subtle scarring with subtle rings in the distal                           esophagus. I think these findings are most consistent                          with history reflux esophagitis/GERD though                          eosinophilic esophagitis is possible. Biopsies taken.                          Will try omeprazole 40 mg for now and adjust dose                          based on pathology results and response. Keep                          appointment in esophageal clinic as scheduled.     Addendum   A.  STOMACH, BIOPSY:  - No H. pylori organisms identified (supported by a Helicobacter immunohistochemical stain)      Addendum electronically signed by Luis Chua DO on 3/21/2024 at  2:20 PM   Final Diagnosis   A.  STOMACH, BIOPSY:  - Chronic inactive gastritis  - An immunohistochemical stain to rule out Helicobacter will be performed and reported in an addendum  - No intestinal metaplasia identified     B.  ESOPHAGUS, DISTAL, BIOPSY:  - Unremarkable squamous mucosa  - No evidence of eosinophilic esophagitis  - Gastric-type mucosa  - No goblet cells (intestinal metaplasia) identified     C.  ESOPHAGUS, PROXIMAL, BIOPSY:  - Unremarkable squamous mucosa  - No evidence of eosinophilic esophagitis       Colonoscopy:    3/19/2024                                                                  Findings:        Small skin tags were found on perianal exam. No fissure or significant        hemorrhoids today.        The terminal ileum appeared normal.        The entire examined colon appeared normal on direct and retroflexion        views.     EndoFLIP directed at the UES or LES (8cm (EF-325) balloon length or 16cm (EF-322) balloon length):   Date:  8cm balloon  Balloon inflation Balloon pressure CSA (mm^2) DI (mm^2/mmHg) Dmin (mm) Compliance   20 (ladmark ID)        30        40        50           16cm balloon  Balloon inflation Balloon pressure CSA (mm^2) DI (mm^2/mmHg) Dmin (mm) Compliance   30 (ladmark ID)        40        50        60        70           High  Resolution Manometry:    PH/Impedance:     Bravo:    CT:    Esophagram:    FL VSS:     GES:    U/S:     XRAY:    Other:       Prior medical records were reviewed including, but not limited to, notes from referring providers, lab work, radiographic tests, and other diagnostic tests. Pertinent results were summarized above.     History     Past Medical History:   Diagnosis Date     ADHD (attention deficit hyperactivity disorder)      Depressive disorder      Fear of flying      Herpes simplex virus infection      Rosacea      Vitamin D deficiency      Vitiligo        Past Surgical History:   Procedure Laterality Date     COLONOSCOPY N/A 3/19/2024    Procedure: Colonoscopy;  Surgeon: Osbaldo Saunders MD;  [place] [facility] [place]       Social History     Socioeconomic History     Marital status: Single     Spouse name: Not on file     Number of children: Not on file     Years of education: Not on file     Highest education level: Not on file   Occupational History     Not on file   Tobacco Use     Smoking status: Former     Smokeless tobacco: Never   Vaping Use     Vaping Use: Never used   Substance and Sexual Activity     Alcohol use: Not Currently     Comment: former     Drug use: Yes     Types: Marijuana, Psilocybin     Comment: occasional use     Sexual activity: Not Currently     Partners: Male, Female     Birth control/protection: I.U.D.   Other Topics Concern     Not on file   Social History Narrative     Not on file     Social Determinants of Health     Financial Resource Strain: Low Risk  (2/15/2024)    Financial Resource Strain      Within the past 12 months, have you or your family members you live with been unable to get utilities (heat, electricity) when it was really needed?: No   Food Insecurity: Low Risk  (2/15/2024)    Food Insecurity      Within the past 12 months, did you worry that your food would run out before you got money to buy more?: No      Within the past 12 months, did the food you bought  just not last and you didn t have money to get more?: No   Transportation Needs: Low Risk  (2/15/2024)    Transportation Needs      Within the past 12 months, has lack of transportation kept you from medical appointments, getting your medicines, non-medical meetings or appointments, work, or from getting things that you need?: No   Physical Activity: Not on file   Stress: Not on file   Social Connections: Not on file   Interpersonal Safety: Low Risk  (2/15/2024)    Interpersonal Safety      Do you feel physically and emotionally safe where you currently live?: Yes      Within the past 12 months, have you been hit, slapped, kicked or otherwise physically hurt by someone?: No      Within the past 12 months, have you been humiliated or emotionally abused in other ways by your partner or ex-partner?: No   Housing Stability: Low Risk  (2/15/2024)    Housing Stability      Do you have housing? : Yes      Are you worried about losing your housing?: No       Family History   Problem Relation Age of Onset     Hypertension Father      Substance Abuse Maternal Grandmother      Diabetes Maternal Grandmother      Cancer Maternal Grandfather      Cancer Paternal Grandmother      Substance Abuse Paternal Grandfather      Aortic dissection Paternal Grandfather      Depression Brother      Melanoma No family hx of      Skin Cancer No family hx of      Family history reviewed and edited as appropriate    Medications and Allergies:     Outpatient Encounter Medications as of 3/26/2024   Medication Sig Dispense Refill     ALPRAZolam (XANAX) 0.25 MG tablet Take 1 tablet (0.25 mg) by mouth 3 times daily as needed for anxiety 10 tablet 0     buPROPion (WELLBUTRIN XL) 150 MG 24 hr tablet Take 1 tablet (150 mg) by mouth every morning (Patient taking differently: Take 150 mg by mouth Every other day) 90 tablet 1     buPROPion (WELLBUTRIN XL) 300 MG 24 hr tablet Take 1 tablet (300 mg) by mouth every morning (Patient taking differently: Take  "300 mg by mouth Every other day) 90 tablet 1     hydrocortisone (ANUSOL-HC) 25 MG suppository Place 1 suppository (25 mg) rectally 2 times daily 24 suppository 0     omeprazole (PRILOSEC) 40 MG DR capsule Take 1 capsule (40 mg) by mouth daily 60 capsule 1     Probiotic Product (UP4 PROBIOTICS PO)        valACYclovir (VALTREX) 1000 mg tablet Take 1 tablet (1,000 mg) by mouth 2 times daily 20 tablet 0     No facility-administered encounter medications on file as of 3/26/2024.        No Known Allergies     Review of systems:  A full 10 point review of systems was obtained and was negative except for the pertinent positives and negatives stated within the HPI.    Objective Findings:   Physical Exam:    Constitutional: There were no vitals taken for this visit.  General: Alert, cooperative, no distress, well-appearing  Head: Atraumatic, normocephalic, no obvious abnormalities   Eyes: Sclera anicteric, no obvious conjunctival hemorrhage   Nose: Nares normal, no obvious malformation, no obvious rhinorrhea   Respiratory: Resting comfortably, no apparent distress, no cough.  Skin: No jaundice, no obvious rash  Neurologic: AAOx3, no obvious neurologic abnormality  Psychiatric: Normal Affect, appropriate mood  Extremities: No obvious edema, no obvious malformation     Labs, Radiology, Pathology     Lab Results   Component Value Date    HGB 13.0 06/21/2022    CHOL 199 06/21/2022    TRIG 268 (H) 06/21/2022    TRIG 196 (A) 05/19/2021    HDL 41 (L) 06/21/2022     (L) 06/21/2022    BUN 7.3 06/21/2022    CO2 19 (L) 06/21/2022    TSH 3.23 06/21/2022        Liver Function Studies - No results for input(s): \"PROTTOTAL\", \"ALBUMIN\", \"BILITOTAL\", \"ALKPHOS\", \"AST\", \"ALT\", \"BILIDIRECT\" in the last 93363 hours.       Patient Active Problem List    Diagnosis Date Noted     Moderate episode of recurrent major depressive disorder (H) 06/09/2023     Priority: Medium     ADHD (attention deficit hyperactivity disorder), inattentive type " 12/14/2022     Priority: Medium     Depression, unspecified depression type 12/14/2022     Priority: Medium      Assessment and Plan   Assessment/Plan:    Vidhi Tovar is a 43 year old female with significant past medical history pertinent for ADHD, depression who is presenting as a new patient in consultation at the request of Dr. Keri cevallos. provider found with a chief complaint of substernal chest pain.    #Substernal Chest Pain  Today Mindy presents with 6 to 7-year history of dysphagia/substernal chest pain which is occurring only postprandially.  In the past year she reports a change in symptoms in regards to severity rather than frequency.  With symptoms occurring 1-2 times per year. Now, however she is experiencing forceful regurgitation of the previously consumed food particles. She has associated reflux symptoms which are occurring seldomly once every 2 to 3 months which predominantly consisted of regurgitation and is relieved with over-the-counter antacids. The regurgitation is occurring independently of the substernal chest pain episodes. She denies heartburn and additional extra esophageal manifestations of GERD.    Prior evaluation includes upper endoscopy performed 3/19/2024 by Dr. Saunders that was pertinent for a distal Schatzki's ring that was nonobstructing.  Dilation was performed to 21 mm with a TTS balloon.  Mucosal disruption was appreciated.  At this time it is unclear if she has received benefit.  Additionally,  Mindy was prescribed Omeprazole 40 mg once daily however is yet to start this medication.    - High-resolution manometry study with test meal.  Please bring identified trigger foods to procedure  - Time barium esophagram with tablet. To schedule imaging, please call [phone]   - As reflux symptoms are seldom it is okay to defer initiation of omeprazole and use over-the-counter antacids as needed    #Colorectal Cancer Screening   Colonoscopy 3/2024 without polyps. No family history  of CRC. Per the most recent update by the [place] Multi-Society Task Force on Colorectal Cancer recall should be 10 years, 2034 or sooner if otherwise indicated.       Follow up plan:   Return to clinic 4 months and as needed.    The risks and benefits of my recommendations, as well as other treatment options were discussed with the patient and any available family today. All questions were answered.     o Follow up: As planned above. Today, I personally spent 21 minutes in direct face to face time with the patient, of which greater than 50% of the time was spent in patient education and counseling as described above. Approximately 16 minutes were spent on indirect care associated with the patient's consultation including but not limited to review of: patient medical records to date, clinic visits, hospital records, lab results, imaging studies, procedural documentation, and coordinating care with other providers. The findings from this review are summarized in the above note. All of the above accounted for a cumulative time of 37 minutes and was performed on the date of service.     The patient verbalized understanding of the plan and was appreciative for the time spent and information provided during the office visit.           Leydi Powell PA-C  Division of Gastroenterology, Hepatology, and Nutrition  [facility]       Documentation assisted by voice recognition and documentation system.

## 2024-03-26 NOTE — PATIENT INSTRUCTIONS
It was a pleasure taking care of you today.  I've included a brief summary of our discussion and care plan from today's visit below.  Please review this information with your primary care provider.  _______________________________________________________________________    My recommendations are summarized as follows:    - High-resolution manometry study with test meal.  Please bring identified trigger foods to procedure  - Time barium esophagram with tablet. To schedule imaging, please call [phone]   - Please schedule follow-up office visit 6-8 weeks after completion of high-resolution manometry  - As reflux symptoms are seldom it is okay to defer initiation of omeprazole and use over-the-counter antacids as needed    To schedule endoscopic procedures you may call: [phone]  To schedule radiology (imaging) tests you may call: [phone]  To schedule an ENT appointment you may call: [phone]    Please call my nurse Nory ([phone]), Power ([phone]) with any questions or concerns.      Return to GI Clinic in 4 months to review your progress.    _______________________________________________________________________    Who do I call with any questions after my visit?  Please be in touch if there are any further questions that arise following today's visit.  There are multiple ways to contact your gastroenterology care team.      During business hours, you may reach a Gastroenterology nurse at [phone] and choose option 3.       To schedule or reschedule an appointment, please call [phone].     You can always send a secure message through erento.  erento messages are answered by your nurse or doctor typically within 24 hours.  Please allow extra time on weekends and holidays.      For urgent/emergent questions after business hours, you may reach the on-call GI Fellow by contacting the [facility]  at [phone].     How will I get the results of any tests ordered?     You will receive all of your results.  If you have signed up for [facility] Plumbhart, any tests ordered at your visit will be available to you after your physician reviews them.  Typically this takes 1-2 weeks.  If there are urgent results that require a change in your care plan, your physician or nurse will call you to discuss the next steps.      What is Victoria Plumbhart?  [email or web address] is a secure way for you to access all of your healthcare records from the [facility].  It is a web based computer program, so you can sign on to it from any location.  It also allows you to send secure messages to your care team.  I recommend signing up for [email or web address] access if you have not already done so and are comfortable with using a computer.      How to I schedule a follow-up visit?  If you did not schedule a follow-up visit today, please call [phone] to schedule a follow-up office visit.      If you feel you received exceptional care and are interested in supporting the clinical and research goals of Leydi Powell PA-C or the Division of Gastroenterology, Hepatology, and Nutrition please contact [email or web address] from the [facility] to discuss opportunities to donate.    Sincerely,    Leydi Powell PA-C  Division of Gastroenterology, Hepatology, and Nutrition  [facility]

## 2024-03-26 NOTE — LETTER
3/26/2024         RE: Vidhi Tovar  [address]  [facility] [address]        Dear Colleague,    Thank you for referring your patient, Vidhi Tovar, to the [facility] [place]. Please see a copy of my visit note below.    Gastroenterology Visit for: Vidhi Tovar 1980   MRN: [id number]     Reason for Visit:  chief complaint    Referred by: No ref. provider found  /   Patient Care Team:  Mary Schwartz NP as PCP - General (Family Medicine)  Bibiana Cochran, PhD LP as Assigned Behavioral Health Provider  Tracy Ryan NP as Nurse Practitioner (Primary Care - CC)  Clive Wilks MD as MD (Dermatology)  Venice Alejandra DNP as Assigned Neuroscience Provider  Clive Wilks MD as MD (Dermatology)  Clive Wilks MD as Assigned Surgical Provider  Mary Schwartz NP as Assigned PCP    History of Present Illness:   Vidhi Tovar is a 43 year old female with significant past medical history pertinent for ADHD, depression who is presenting as a new patient in consultation at the request of Dr. Saavedra ref. provider found with a chief complaint of substernal chest pain.    Onset of dysphagia/substernal chest pain symptoms 6-7 years prior described as mild. This is always associated with consumption of solids. Triggers include potatoes, rice and carrots. The sensation can occur at most 10 - 15 minutes. In the past year however there has been a change in the severity symptoms now resulting in forceful regurgitation.  The change has been in regards to severity rather than frequency.  She does not self induce emesis. Overall the sensation subsides with time.This is occurring 1-2 times per year. Denies coughing, choking and gagging.    Has yet to start the Omeprazole 40 mg once daily. Has noted a difference after dilation.  However questions if this is more of a psychological process knowing that she had the dilation rather than a true improvement  in symptoms.    Seldomly does Mindy experience reflux.  Noting that she will have regurgitation occuring once every 2-3 months either with the consumption of sugar or with extreme fatigue. Notes with weight loss symptoms improve. The regurgitation is not related to the swallowing difficulties/substrnal chest pain. Denies heartburn. Relieved with TUMS or OTC antacid.     Denies weight loss, nausea, emesis, dysphonia/hoarseness, chronic cough/throat clearing, abdominal pain, diarrhea, constipation (< 3 stools per week), incontinence of feces, melena, hematochezia and BRBR.      Denies use of ETOH and tobacco products.    Will use edible cannabinoid products once per week.     No additional recreational drug use.     Paternal Grandfather had esophageal perforation and .     No family history or GI related malignancy (esophageal, gastric, pancreatic, liver or colon).     ----------------------------------------------------------------------------------------------------------------------------------------------------------------------------------------------------------------------------------  2023 E Consult Dr. Saunders     Clinical Question/Purpose: MY CLINICAL QUESTION IS: Ms Tovar has had esophageal spasms for 4 years, off and on.  Recently they have escalated in frequency and severity and now has associated vomiting.  The pain is severe, it is now always associated with either rice, potatoes or carrots. She does not have difficulty swallowing.      Patient assessment and information reviewed:   Question of esophageal spasm? - discomfort in chest - now possibly related to eating food - has had to vomit (or regurgitate?) food  Rectal bleeding     To further evaluate symptoms recommend EGD. This will help ensure there is  no esophagitis or esophageal stricture of other pathology. We will arrange for esophageal clinic follow up to go over results of EGD and if symptoms warrant high resolution manometry could  be considered     Rectal bleeding may be related to hemorrhoids but would recommend colonoscopy as she is almost due for colon cancer screening anyway.     Recommendations:   -please place adult GI  referral for EGD/colonoscopy   -we will arrange esophageal clinic follow up          Esophageal Questionnaire(s)    BEDQ Questionnaire       No data to display                   No data to display                Eckardt Questionnaire       No data to display                Promis 10 Questionnaire      12/14/2022     8:31 AM 2/1/2023     9:29 AM 5/18/2023    10:35 AM 8/9/2023     9:33 AM 12/13/2023     2:23 PM   PROMIS 10 FLOWSHEET DATA   In general, would you say your health is: 3 3 3 3 4   In general, would you say your quality of life is: 4 5 5 5 5   In general, how would you rate your physical health? 3 3 3 3 3   In general, how would you rate your mental health, including your mood and your ability to think? 3 3 3 3 3   In general, how would you rate your satisfaction with your social activities and relationships? 2 3 3 3 4   In general, please rate how well you carry out your usual social activities and roles. (This includes activities at home, at work and in your community, and responsibilities as a parent, child, spouse, employee, friend, etc.) 4 4 5 4 4   To what extent are you able to carry out your everyday physical activities such as walking, climbing stairs, carrying groceries, or moving a chair? 5 5 5 5 5   In the past 7 days, how often have you been bothered by emotional problems such as feeling anxious, depressed, or irritable? 3 2 3 3 2   In the past 7 days, how would you rate your fatigue on average? 3 2 2 3 3   In the past 7 days, how would you rate your pain on average, where 0 means no pain, and 10 means worst imaginable pain? 0 1 2 1 3   Mental health question re-calculation - no clinical value 3 4 3 3 4   Physical health question re-calculation - no clinical value 3 4 4 3 3   Pain question  re-calculation - no clinical value 5 4 4 4 4   Global Mental Health Score 12 15 14 14 16   Global Physical Health Score 16 16 16 15 15   PROMIS TOTAL - SUBSCORES 28 31 30 29 31           STUDIES & PROCEDURES:    EGD:     3/19/2024  Findings:        A mild Schatzki ring was found at the gastroesophageal junction. A TTS        dilator was passed through the scope. Dilation with a 11-30-73-20-21 mm        balloon dilator was performed to 21 mm. The dilation site was examined        and showed moderate mucosal disruption, complete resolution of luminal        narrowing, no bleeding and no perforation.        The Z-line was regular and was found 37 cm from the incisors.        There was subtle scarring in the distal esophagus with subtle rings that        are most consistent with prior reflux esophagitis. The exam of the        esophagus was otherwise normal.        Biopsies were obtained from the proximal and distal esophagus with cold        forceps for histology of suspected eosinophilic esophagitis.        Striped mildly erythematous mucosa without bleeding was found in the        gastric body and in the gastric antrum. Biopsies were taken with a cold        forceps for histology.        The exam of the stomach was otherwise normal.        The duodenal bulb, first portion of the duodenum, second portion of the        duodenum and third portion of the duodenum were normal.      Impression:            - Mild Schatzki ring. Dilated.                          - Z-line regular, 37 cm from the incisors.                          - Erythematous mucosa in the gastric body and antrum.                          Biopsied.                          - Normal duodenal bulb, first portion of the duodenum,                          second portion of the duodenum and third portion of                          the duodenum.                          - Biopsies were taken with a cold forceps for                          evaluation of  eosinophilic esophagitis.                          I suspect her esophageal spasm symptoms are actually                          due to dysphagia. She does have a mild schatzki ring                          and subtle scarring with subtle rings in the distal                          esophagus. I think these findings are most consistent                          with history reflux esophagitis/GERD though                          eosinophilic esophagitis is possible. Biopsies taken.                          Will try omeprazole 40 mg for now and adjust dose                          based on pathology results and response. Keep                          appointment in esophageal clinic as scheduled.     Addendum   A.  STOMACH, BIOPSY:  - No H. pylori organisms identified (supported by a Helicobacter immunohistochemical stain)      Addendum electronically signed by Luis Chua DO on 3/21/2024 at  2:20 PM   Final Diagnosis   A.  STOMACH, BIOPSY:  - Chronic inactive gastritis  - An immunohistochemical stain to rule out Helicobacter will be performed and reported in an addendum  - No intestinal metaplasia identified     B.  ESOPHAGUS, DISTAL, BIOPSY:  - Unremarkable squamous mucosa  - No evidence of eosinophilic esophagitis  - Gastric-type mucosa  - No goblet cells (intestinal metaplasia) identified     C.  ESOPHAGUS, PROXIMAL, BIOPSY:  - Unremarkable squamous mucosa  - No evidence of eosinophilic esophagitis       Colonoscopy:    3/19/2024                                                                  Findings:        Small skin tags were found on perianal exam. No fissure or significant        hemorrhoids today.        The terminal ileum appeared normal.        The entire examined colon appeared normal on direct and retroflexion        views.     EndoFLIP directed at the UES or LES (8cm (EF-325) balloon length or 16cm (EF-322) balloon length):   Date:  8cm balloon  Balloon inflation Balloon pressure CSA (mm^2) DI  (mm^2/mmHg) Dmin (mm) Compliance   20 (ladmark ID)        30        40        50           16cm balloon  Balloon inflation Balloon pressure CSA (mm^2) DI (mm^2/mmHg) Dmin (mm) Compliance   30 (ladmark ID)        40        50        60        70           High Resolution Manometry:    PH/Impedance:     Bravo:    CT:    Esophagram:    FL VSS:     GES:    U/S:     XRAY:    Other:       Prior medical records were reviewed including, but not limited to, notes from referring providers, lab work, radiographic tests, and other diagnostic tests. Pertinent results were summarized above.     History     Past Medical History:   Diagnosis Date    ADHD (attention deficit hyperactivity disorder)     Depressive disorder     Fear of flying     Herpes simplex virus infection     Rosacea     Vitamin D deficiency     Vitiligo        Past Surgical History:   Procedure Laterality Date    COLONOSCOPY N/A 3/19/2024    Procedure: Colonoscopy;  Surgeon: Osbaldo Saunders MD;  Location: [facility]       Social History     Socioeconomic History    Marital status: Single     Spouse name: Not on file    Number of children: Not on file    Years of education: Not on file    Highest education level: Not on file   Occupational History    Not on file   Tobacco Use    Smoking status: Former    Smokeless tobacco: Never   Vaping Use    Vaping Use: Never used   Substance and Sexual Activity    Alcohol use: Not Currently     Comment: former    Drug use: Yes     Types: Marijuana, Psilocybin     Comment: occasional use    Sexual activity: Not Currently     Partners: Male, Female     Birth control/protection: I.U.D.   Other Topics Concern    Not on file   Social History Narrative    Not on file     Social Determinants of Health     Financial Resource Strain: Low Risk  (2/15/2024)    Financial Resource Strain     Within the past 12 months, have you or your family members you live with been unable to get utilities (heat, electricity) when it was really  needed?: No   Food Insecurity: Low Risk  (2/15/2024)    Food Insecurity     Within the past 12 months, did you worry that your food would run out before you got money to buy more?: No     Within the past 12 months, did the food you bought just not last and you didn t have money to get more?: No   Transportation Needs: Low Risk  (2/15/2024)    Transportation Needs     Within the past 12 months, has lack of transportation kept you from medical appointments, getting your medicines, non-medical meetings or appointments, work, or from getting things that you need?: No   Physical Activity: Not on file   Stress: Not on file   Social Connections: Not on file   Interpersonal Safety: Low Risk  (2/15/2024)    Interpersonal Safety     Do you feel physically and emotionally safe where you currently live?: Yes     Within the past 12 months, have you been hit, slapped, kicked or otherwise physically hurt by someone?: No     Within the past 12 months, have you been humiliated or emotionally abused in other ways by your partner or ex-partner?: No   Housing Stability: Low Risk  (2/15/2024)    Housing Stability     Do you have housing? : Yes     Are you worried about losing your housing?: No       Family History   Problem Relation Age of Onset    Hypertension Father     Substance Abuse Maternal Grandmother     Diabetes Maternal Grandmother     Cancer Maternal Grandfather     Cancer Paternal Grandmother     Substance Abuse Paternal Grandfather     Aortic dissection Paternal Grandfather     Depression Brother     Melanoma No family hx of     Skin Cancer No family hx of      Family history reviewed and edited as appropriate    Medications and Allergies:     Outpatient Encounter Medications as of 3/26/2024   Medication Sig Dispense Refill    ALPRAZolam (XANAX) 0.25 MG tablet Take 1 tablet (0.25 mg) by mouth 3 times daily as needed for anxiety 10 tablet 0    buPROPion (WELLBUTRIN XL) 150 MG 24 hr tablet Take 1 tablet (150 mg) by mouth  "every morning (Patient taking differently: Take 150 mg by mouth Every other day) 90 tablet 1    buPROPion (WELLBUTRIN XL) 300 MG 24 hr tablet Take 1 tablet (300 mg) by mouth every morning (Patient taking differently: Take 300 mg by mouth Every other day) 90 tablet 1    hydrocortisone (ANUSOL-HC) 25 MG suppository Place 1 suppository (25 mg) rectally 2 times daily 24 suppository 0    omeprazole (PRILOSEC) 40 MG DR capsule Take 1 capsule (40 mg) by mouth daily 60 capsule 1    Probiotic Product (UP4 PROBIOTICS PO)       valACYclovir (VALTREX) 1000 mg tablet Take 1 tablet (1,000 mg) by mouth 2 times daily 20 tablet 0     No facility-administered encounter medications on file as of 3/26/2024.        No Known Allergies     Review of systems:  A full 10 point review of systems was obtained and was negative except for the pertinent positives and negatives stated within the HPI.    Objective Findings:   Physical Exam:    Constitutional: There were no vitals taken for this visit.  General: Alert, cooperative, no distress, well-appearing  Head: Atraumatic, normocephalic, no obvious abnormalities   Eyes: Sclera anicteric, no obvious conjunctival hemorrhage   Nose: Nares normal, no obvious malformation, no obvious rhinorrhea   Respiratory: Resting comfortably, no apparent distress, no cough.  Skin: No jaundice, no obvious rash  Neurologic: AAOx3, no obvious neurologic abnormality  Psychiatric: Normal Affect, appropriate mood  Extremities: No obvious edema, no obvious malformation     Labs, Radiology, Pathology     Lab Results   Component Value Date    HGB 13.0 06/21/2022    CHOL 199 06/21/2022    TRIG 268 (H) 06/21/2022    TRIG 196 (A) 05/19/2021    HDL 41 (L) 06/21/2022     (L) 06/21/2022    BUN 7.3 06/21/2022    CO2 19 (L) 06/21/2022    TSH 3.23 06/21/2022        Liver Function Studies - No results for input(s): \"PROTTOTAL\", \"ALBUMIN\", \"BILITOTAL\", \"ALKPHOS\", \"AST\", \"ALT\", \"BILIDIRECT\" in the last 65966 hours. "       Patient Active Problem List    Diagnosis Date Noted    Moderate episode of recurrent major depressive disorder (H) 06/09/2023     Priority: Medium    ADHD (attention deficit hyperactivity disorder), inattentive type 12/14/2022     Priority: Medium    Depression, unspecified depression type 12/14/2022     Priority: Medium      Assessment and Plan   Assessment/Plan:    Vidhi Tovar is a 43 year old female with significant past medical history pertinent for ADHD, depression who is presenting as a new patient in consultation at the request of Dr. Keri cevallos. provider found with a chief complaint of substernal chest pain.    #Substernal Chest Pain  Today Mindy presents with 6 to 7-year history of dysphagia/substernal chest pain which is occurring only postprandially.  In the past year she reports a change in symptoms in regards to severity rather than frequency.  With symptoms occurring 1-2 times per year. Now, however she is experiencing forceful regurgitation of the previously consumed food particles. She has associated reflux symptoms which are occurring seldomly once every 2 to 3 months which predominantly consisted of regurgitation and is relieved with over-the-counter antacids. The regurgitation is occurring independently of the substernal chest pain episodes. She denies heartburn and additional extra esophageal manifestations of GERD.    Prior evaluation includes upper endoscopy performed 3/19/2024 by Dr. Saunders that was pertinent for a distal Schatzki's ring that was nonobstructing.  Dilation was performed to 21 mm with a TTS balloon.  Mucosal disruption was appreciated.  At this time it is unclear if she has received benefit.  Additionally,  Mindy was prescribed Omeprazole 40 mg once daily however is yet to start this medication.    - High-resolution manometry study with test meal.  Please bring identified trigger foods to procedure  - Time barium esophagram with tablet. To schedule imaging, please call  [phone]   - As reflux symptoms are seldom it is okay to defer initiation of omeprazole and use over-the-counter antacids as needed    #Colorectal Cancer Screening   Colonoscopy 3/2024 without polyps. No family history of CRC. Per the most recent update by the [place] Multi-Society Task Force on Colorectal Cancer recall should be 10 years, 2034 or sooner if otherwise indicated.       Follow up plan:   Return to clinic 4 months and as needed.    The risks and benefits of my recommendations, as well as other treatment options were discussed with the patient and any available family today. All questions were answered.     Follow up: As planned above. Today, I personally spent 21 minutes in direct face to face time with the patient, of which greater than 50% of the time was spent in patient education and counseling as described above. Approximately 16 minutes were spent on indirect care associated with the patient's consultation including but not limited to review of: patient medical records to date, clinic visits, hospital records, lab results, imaging studies, procedural documentation, and coordinating care with other providers. The findings from this review are summarized in the above note. All of the above accounted for a cumulative time of 37 minutes and was performed on the date of service.     The patient verbalized understanding of the plan and was appreciative for the time spent and information provided during the office visit.         Documentation assisted by voice recognition and documentation system.          Again, thank you for allowing me to participate in the care of your patient.      Sincerely,    Leydi Powell PA-C

## 2024-03-26 NOTE — NURSING NOTE
Is the patient currently in the state of [place]? YES    Visit mode:VIDEO    If the visit is dropped, the patient can be reconnected by: VIDEO VISIT: Text to cell phone:   Telephone Information:   Mobile [phone]       Will anyone else be joining the visit? NO  (If patient encounters technical issues they should call [phone]    How would you like to obtain your AVS? MyChart    Are changes needed to the allergy or medication list? No    Reason for visit: Consult    Dai UP

## 2024-03-26 NOTE — PROGRESS NOTES
Virtual Visit Details    Type of service:  Video Visit   Video Start Time: 805  Video End Time:827    Originating Location (pt. Location): Home    Distant Location (provider location):  Off-site  Platform used for Video Visit: Anthony    Unable to edit existing note, new note created

## 2024-04-01 ENCOUNTER — TELEPHONE (OUTPATIENT)
Dept: GASTROENTEROLOGY | Facility: CLINIC | Age: 44
End: 2024-04-01
Payer: COMMERCIAL

## 2024-04-01 NOTE — TELEPHONE ENCOUNTER
Left Voicemail (1st Attempt) and Sent Mychart (1st Attempt) for the patient to call back and schedule the following:    Appointment type: Return Esophageal   Provider: Leydi Powell PA-C  Return date: 07/26/24  Specialty phone number: [phone]  Additional appointment(s) needed: N/A  Additonal Notes: N/A

## 2024-04-03 ENCOUNTER — TELEPHONE (OUTPATIENT)
Dept: GASTROENTEROLOGY | Facility: CLINIC | Age: 44
End: 2024-04-03
Payer: COMMERCIAL

## 2024-04-03 NOTE — TELEPHONE ENCOUNTER
Non-Invasive GI Procedure Scheduling Questionnaire    Have you had a positive Covid test in the last 14 days?  No    Are you active on [employer]?   No    What insurance is in the chart?  Other:  [employer]    Ordering/Referring Provider: Leydi Powell PA-C   (If ordering provider performs procedure, schedule with ordering provider unless otherwise instructed. )    (Females) Are you currently pregnant? No - Okay to continue scheduling.    Have you ever had or are you awaiting a heart or lung transplant? No - Schedule next available.    Do you need assistance transferring? No - Continue scheduling.    Do you take acid reflux medication or proton-pump inhibitors (PPI)? No - Continue scheduling.    Patient Reminders:  Please inform patients to review instructions sent via Mogi or letter two weeks before procedure.  The Manometry exam may take up to 90 minutes.  The Breath Test exam may take up to 4 hours.

## 2024-04-04 ENCOUNTER — TELEPHONE (OUTPATIENT)
Dept: GASTROENTEROLOGY | Facility: CLINIC | Age: 44
End: 2024-04-04
Payer: COMMERCIAL

## 2024-04-04 NOTE — TELEPHONE ENCOUNTER
Left Voicemail (2nd Attempt) and Sent Mychart (2nd Attempt) for the patient to call back and schedule the following:    Appointment type: Return Esophageal  Provider: Leydi Powell PA-C  Return date: 07/26/24  Specialty phone number: [phone]  Additional appointment(s) needed: N/A  Additonal Notes: N/A

## 2024-04-11 ENCOUNTER — THERAPY VISIT (OUTPATIENT)
Dept: PHYSICAL THERAPY | Facility: CLINIC | Age: 44
End: 2024-04-11
Payer: COMMERCIAL

## 2024-04-11 DIAGNOSIS — M75.91 RIGHT SUPRASPINATUS TENDONITIS: Primary | ICD-10-CM

## 2024-04-11 PROCEDURE — 97110 THERAPEUTIC EXERCISES: CPT | Mod: GP | Performed by: PHYSICAL THERAPIST

## 2024-04-11 PROCEDURE — 97140 MANUAL THERAPY 1/> REGIONS: CPT | Mod: GP | Performed by: PHYSICAL THERAPIST

## 2024-04-24 ENCOUNTER — THERAPY VISIT (OUTPATIENT)
Dept: PHYSICAL THERAPY | Facility: CLINIC | Age: 44
End: 2024-04-24
Payer: COMMERCIAL

## 2024-04-24 DIAGNOSIS — M75.91 RIGHT SUPRASPINATUS TENDONITIS: Primary | ICD-10-CM

## 2024-04-24 PROCEDURE — 97140 MANUAL THERAPY 1/> REGIONS: CPT | Mod: GP | Performed by: PHYSICAL THERAPIST

## 2024-04-24 PROCEDURE — 97112 NEUROMUSCULAR REEDUCATION: CPT | Mod: GP | Performed by: PHYSICAL THERAPIST

## 2024-05-10 ENCOUNTER — PATIENT OUTREACH (OUTPATIENT)
Dept: GASTROENTEROLOGY | Facility: CLINIC | Age: 44
End: 2024-05-10
Payer: COMMERCIAL

## 2024-05-10 NOTE — TELEPHONE ENCOUNTER
- High-resolution manometry study with test meal.  Please bring identified trigger foods to procedure.

## 2024-07-01 ASSESSMENT — PATIENT HEALTH QUESTIONNAIRE - PHQ9: SUM OF ALL RESPONSES TO PHQ QUESTIONS 1-9: 5

## 2024-07-03 ENCOUNTER — VIRTUAL VISIT (OUTPATIENT)
Dept: FAMILY MEDICINE | Facility: CLINIC | Age: 44
End: 2024-07-03
Payer: COMMERCIAL

## 2024-07-03 DIAGNOSIS — F41.9 ANXIETY: ICD-10-CM

## 2024-07-03 DIAGNOSIS — F90.0 ADHD (ATTENTION DEFICIT HYPERACTIVITY DISORDER), INATTENTIVE TYPE: ICD-10-CM

## 2024-07-03 DIAGNOSIS — U07.1 INFECTION DUE TO 2019 NOVEL CORONAVIRUS: Primary | ICD-10-CM

## 2024-07-03 RX ORDER — BUPROPION HYDROCHLORIDE 150 MG/1
150 TABLET ORAL
Qty: 45 TABLET | Refills: 3 | Status: SHIPPED | OUTPATIENT
Start: 2024-07-03

## 2024-07-03 RX ORDER — ALPRAZOLAM 0.25 MG
0.25 TABLET ORAL 3 TIMES DAILY PRN
Qty: 45 TABLET | Refills: 0 | Status: SHIPPED | OUTPATIENT
Start: 2024-07-03 | End: 2024-08-27

## 2024-07-03 RX ORDER — BUPROPION HYDROCHLORIDE 300 MG/1
300 TABLET ORAL
Qty: 45 TABLET | Refills: 3 | Status: SHIPPED | OUTPATIENT
Start: 2024-07-03

## 2024-07-03 ASSESSMENT — ANXIETY QUESTIONNAIRES
GAD7 TOTAL SCORE: 4
2. NOT BEING ABLE TO STOP OR CONTROL WORRYING: NOT AT ALL
IF YOU CHECKED OFF ANY PROBLEMS ON THIS QUESTIONNAIRE, HOW DIFFICULT HAVE THESE PROBLEMS MADE IT FOR YOU TO DO YOUR WORK, TAKE CARE OF THINGS AT HOME, OR GET ALONG WITH OTHER PEOPLE: SOMEWHAT DIFFICULT
3. WORRYING TOO MUCH ABOUT DIFFERENT THINGS: SEVERAL DAYS
7. FEELING AFRAID AS IF SOMETHING AWFUL MIGHT HAPPEN: NOT AT ALL
4. TROUBLE RELAXING: SEVERAL DAYS
GAD7 TOTAL SCORE: 4
GAD7 TOTAL SCORE: 4
7. FEELING AFRAID AS IF SOMETHING AWFUL MIGHT HAPPEN: NOT AT ALL
6. BECOMING EASILY ANNOYED OR IRRITABLE: SEVERAL DAYS
5. BEING SO RESTLESS THAT IT IS HARD TO SIT STILL: NOT AT ALL
8. IF YOU CHECKED OFF ANY PROBLEMS, HOW DIFFICULT HAVE THESE MADE IT FOR YOU TO DO YOUR WORK, TAKE CARE OF THINGS AT HOME, OR GET ALONG WITH OTHER PEOPLE?: SOMEWHAT DIFFICULT
1. FEELING NERVOUS, ANXIOUS, OR ON EDGE: SEVERAL DAYS

## 2024-07-03 ASSESSMENT — PATIENT HEALTH QUESTIONNAIRE - PHQ9
SUM OF ALL RESPONSES TO PHQ QUESTIONS 1-9: 5
10. IF YOU CHECKED OFF ANY PROBLEMS, HOW DIFFICULT HAVE THESE PROBLEMS MADE IT FOR YOU TO DO YOUR WORK, TAKE CARE OF THINGS AT HOME, OR GET ALONG WITH OTHER PEOPLE: SOMEWHAT DIFFICULT
SUM OF ALL RESPONSES TO PHQ QUESTIONS 1-9: 5

## 2024-07-03 NOTE — PROGRESS NOTES
Mindy is a 44 year old who is being evaluated via a billable video visit.    How would you like to obtain your AVS? MyChart  If the video visit is dropped, the invitation should be resent by: Text to cell phone: [phone]  Will anyone else be joining your video visit? No      Subjective   Mindy is a 44 year old, presenting for the following health issues: she intended to be in today for a physical.  She recently tested positive for COVID, most likely contracted when she visited her elderly parents recently, as they were ill.  She has significant fatigue, stuffy and congested sinuses, mild cough.  She will treat it symptomatically, she feels that she is doing well enough.  This is her third time having COVID.      Mindy would like refills on her bupropion and Xanax.  Her Xanax is used primarily for sleep when she travels and works.  She takes 1/2 of a .25 mg tablet.        Video Start Time:  0920      Review of Systems  As above  CONSTITUTIONAL: NEGATIVE for fever, chills, change in weight  ENT/MOUTH: NEGATIVE for ear, mouth and throat problems  RESP: NEGATIVE for significant cough or SOB  CV: NEGATIVE for chest pain, palpitations or peripheral edema      Objective       Vitals:  No vitals were obtained today due to virtual visit.    Physical Exam   GENERAL: alert and no distress  EYES: Eyes grossly normal to inspection.  No discharge or erythema, or obvious scleral/conjunctival abnormalities.  RESP: No audible wheeze, cough, or visible cyanosis.    SKIN: Visible skin clear. No significant rash, abnormal pigmentation or lesions.  NEURO: Cranial nerves grossly intact.  Mentation and speech appropriate for age.  PSYCH: Appropriate affect, tone, and pace of words    (U07.1) Infection due to 2019 novel coronavirus  (primary encounter diagnosis)  Comment: We discussed OTC symptomatic treatment, Mindy will let me know if symptoms worsen  Plan: Return to clinic as needed    (F90.0) ADHD (attention deficit hyperactivity  disorder), inattentive type    Plan: buPROPion (WELLBUTRIN XL) 150 MG 24 hr tablet,         buPROPion (WELLBUTRIN XL) 300 MG 24 hr tablet      (F41.9) Anxiety    Plan: ALPRAZolam (XANAX) 0.25 MG tablet    Mindy will follow up for a PE when she is able, she is out of town now, possibly until November.  I did let her know that the clinic is closing October 31st, she will try to get in before then if she can.    Video-Visit Details    Type of service:  Video Visit   Video End Time:9:57 AM  Originating Location (pt. Location): Home    Distant Location (provider location):  On-site  Platform used for Video Visit: Anthony  Signed Electronically by: aMry Schwartz NP

## 2024-07-15 ENCOUNTER — OFFICE VISIT (OUTPATIENT)
Dept: FAMILY MEDICINE | Facility: CLINIC | Age: 44
End: 2024-07-15
Payer: COMMERCIAL

## 2024-07-15 VITALS
BODY MASS INDEX: 28.41 KG/M2 | OXYGEN SATURATION: 99 % | SYSTOLIC BLOOD PRESSURE: 120 MMHG | HEART RATE: 67 BPM | WEIGHT: 181 LBS | TEMPERATURE: 98.2 F | DIASTOLIC BLOOD PRESSURE: 81 MMHG | RESPIRATION RATE: 18 BRPM | HEIGHT: 67 IN

## 2024-07-15 DIAGNOSIS — Z00.00 ROUTINE HISTORY AND PHYSICAL EXAMINATION OF ADULT: Primary | ICD-10-CM

## 2024-07-15 DIAGNOSIS — Z20.2 POTENTIAL EXPOSURE TO STD: ICD-10-CM

## 2024-07-15 LAB
BACTERIAL VAGINOSIS VAG-IMP: NEGATIVE
CANDIDA DNA VAG QL NAA+PROBE: DETECTED
CANDIDA GLABRATA / CANDIDA KRUSEI DNA: NOT DETECTED
T VAGINALIS DNA VAG QL NAA+PROBE: NOT DETECTED

## 2024-07-15 PROCEDURE — 87491 CHLMYD TRACH DNA AMP PROBE: CPT | Mod: ORL | Performed by: NURSE PRACTITIONER

## 2024-07-15 PROCEDURE — 0352U MULTIPLEX VAGINAL PANEL BY PCR: CPT | Mod: ORL | Performed by: NURSE PRACTITIONER

## 2024-07-15 PROCEDURE — 87624 HPV HI-RISK TYP POOLED RSLT: CPT | Mod: ORL | Performed by: NURSE PRACTITIONER

## 2024-07-15 PROCEDURE — G0145 SCR C/V CYTO,THINLAYER,RESCR: HCPCS | Mod: ORL | Performed by: NURSE PRACTITIONER

## 2024-07-15 SDOH — HEALTH STABILITY: PHYSICAL HEALTH: ON AVERAGE, HOW MANY DAYS PER WEEK DO YOU ENGAGE IN MODERATE TO STRENUOUS EXERCISE (LIKE A BRISK WALK)?: 3 DAYS

## 2024-07-15 ASSESSMENT — ENCOUNTER SYMPTOMS
CONSTITUTIONAL NEGATIVE: 1
MUSCULOSKELETAL NEGATIVE: 1
ALLERGIC/IMMUNOLOGIC NEGATIVE: 1
ROS SKIN COMMENTS: HISTORY OF VITILIGO
NERVOUS/ANXIOUS: 1
EYES NEGATIVE: 1
RESPIRATORY NEGATIVE: 1
GASTROINTESTINAL NEGATIVE: 1
ENDOCRINE NEGATIVE: 1
HEMATOLOGIC/LYMPHATIC NEGATIVE: 1
CARDIOVASCULAR NEGATIVE: 1

## 2024-07-15 ASSESSMENT — SOCIAL DETERMINANTS OF HEALTH (SDOH): HOW OFTEN DO YOU GET TOGETHER WITH FRIENDS OR RELATIVES?: THREE TIMES A WEEK

## 2024-07-15 ASSESSMENT — PAIN SCALES - GENERAL: PAINLEVEL: NO PAIN (0)

## 2024-07-15 NOTE — NURSING NOTE
"ROOM:1  [facility]    Preferred Name: Mindy     How did you hear about us?  Current Patient     Services Provided? No    44 year old  Chief Complaint   Patient presents with    Physical       Blood pressure 120/81, pulse 67, temperature 98.2  F (36.8  C), temperature source Oral, resp. rate 18, height 1.694 m (5' 6.7\"), weight 82.1 kg (181 lb), last menstrual period 06/22/2024, SpO2 99%. Body mass index is 28.6 kg/m .  BP completed using cuff size:        Patient Active Problem List   Diagnosis    ADHD (attention deficit hyperactivity disorder), inattentive type    Depression, unspecified depression type    Moderate episode of recurrent major depressive disorder (H)       Wt Readings from Last 2 Encounters:   07/15/24 82.1 kg (181 lb)   03/19/24 81.6 kg (180 lb)     BP Readings from Last 3 Encounters:   07/15/24 120/81   03/19/24 120/62   11/08/23 112/77       No Known Allergies    Current Outpatient Medications   Medication Sig Dispense Refill    ALPRAZolam (XANAX) 0.25 MG tablet Take 1 tablet (0.25 mg) by mouth 3 times daily as needed for anxiety 45 tablet 0    buPROPion (WELLBUTRIN XL) 150 MG 24 hr tablet Take 1 tablet (150 mg) by mouth every 48 hours Every other day 45 tablet 3    buPROPion (WELLBUTRIN XL) 300 MG 24 hr tablet Take 1 tablet (300 mg) by mouth every 48 hours Every other day 45 tablet 3    Probiotic Product (UP4 PROBIOTICS PO)       valACYclovir (VALTREX) 1000 mg tablet Take 1 tablet (1,000 mg) by mouth 2 times daily 20 tablet 0    hydrocortisone (ANUSOL-HC) 25 MG suppository Place 1 suppository (25 mg) rectally 2 times daily (Patient not taking: Reported on 7/15/2024) 24 suppository 0    omeprazole (PRILOSEC) 40 MG DR capsule Take 1 capsule (40 mg) by mouth daily (Patient not taking: Reported on 7/15/2024) 60 capsule 1     No current facility-administered medications for this visit.       Social History     Tobacco Use    Smoking status: Never     Passive exposure: Never    " "Smokeless tobacco: Never   Vaping Use    Vaping status: Never Used   Substance Use Topics    Alcohol use: Not Currently     Comment: former    Drug use: Yes     Types: Marijuana, Psilocybin     Comment: occasional use       Honoring Choices - Health Care Directive Guide offered to patient at time of visit.    Health Maintenance Due   Topic Date Due    ADVANCE CARE PLANNING  Never done    DEPRESSION ACTION PLAN  Never done    HEPATITIS B IMMUNIZATION (2 of 3 - Hep B Twinrix 3-dose series) 11/15/2014    YEARLY PREVENTIVE VISIT  06/13/2023    COVID-19 Vaccine (5 - 2023-24 season) 09/01/2023    HPV TEST  05/19/2024    PAP  05/19/2024       Immunization History   Administered Date(s) Administered    COVID-19 Bivalent 18+ (Moderna) 11/14/2022    COVID-19 MONOVALENT 12+ (Pfizer) 07/26/2021, 08/16/2021    COVID-19 Monovalent 12+ (Pfizer 2022) 04/08/2022    Influenza Vaccine >6 months,quad, PF 10/05/2022, 11/08/2023    Influenza Vaccine, 6+MO IM (QUADRIVALENT W/PRESERVATIVES) 11/01/2019, 10/13/2020    TDAP Vaccine (Boostrix) 06/17/2019       No results found for: \"PAP\"    Recent Labs   Lab Test 06/21/22  0807 05/19/21  0000   A1C 5.6  --    *  --    HDL 41*  --    TRIG 268* 196*   CR 0.81  --    GFRESTIMATED >90  --    POTASSIUM 4.1  --    TSH 3.23  --            7/15/2024     2:43 PM 2/15/2024    10:32 AM   PHQ-2 ( 1999 Pfizer)   Q1: Little interest or pleasure in doing things 1 0   Q2: Feeling down, depressed or hopeless 1 1   PHQ-2 Score 2 1   Q1: Little interest or pleasure in doing things Several days    Q2: Feeling down, depressed or hopeless Several days    PHQ-2 Score 2            10/5/2023     1:00 PM 12/13/2023     2:22 PM 7/1/2024     8:50 AM 7/3/2024     8:38 AM   PHQ-9 SCORE   PHQ-9 Total Score MyChart  6 (Mild depression)  5 (Mild depression)   PHQ-9 Total Score 8 6 5 5           10/5/2023     1:00 PM 12/14/2023    12:00 PM 7/3/2024     8:38 AM   DIONNE-7 SCORE   Total Score   4 (minimal anxiety)   Total " Score 7 5 4            No data to display                Bry Maddox    July 15, 2024 2:51 PM

## 2024-07-15 NOTE — PROGRESS NOTES
ANNUAL WELLNESS EXAM     Today's Date: Jul 15, 2024     Patient Vidhi Tovar 1980 presents to the clinic today for a preventative health visit.         SUBJECTIVE     History of Present Illness:    Mindy feels that as far as general wellness, she is doing well.  She has significant work stress, she has done a lot of personal work over the past year and is moving forward.  She has no particular health concerns today.      No Known Allergies     Current Outpatient Medications   Medication Instructions    ALPRAZolam (XANAX) 0.25 mg, Oral, 3 TIMES DAILY PRN    buPROPion (WELLBUTRIN XL) 150 mg, Oral, EVERY 48 HOURS, Every other day    buPROPion (WELLBUTRIN XL) 300 mg, Oral, EVERY 48 HOURS, Every other day    hydrocortisone (ANUSOL-HC) 25 mg, Rectal, 2 TIMES DAILY    omeprazole (PRILOSEC) 40 mg, Oral, DAILY    Probiotic Product (UP4 PROBIOTICS PO) Oral    valACYclovir (VALTREX) 1,000 mg, Oral, 2 TIMES DAILY       Past Medical History:   Diagnosis Date    ADHD (attention deficit hyperactivity disorder)     Depressive disorder     Fear of flying     Herpes simplex virus infection     Rosacea     Vitamin D deficiency     Vitiligo         Family History   Problem Relation Age of Onset    Hypertension Father     Substance Abuse Maternal Grandmother     Diabetes Maternal Grandmother     Heart Disease Maternal Grandfather     Cancer Maternal Grandfather     Cancer Paternal Grandmother     Substance Abuse Paternal Grandfather     Aortic dissection Paternal Grandfather     Depression Brother     Melanoma No family hx of     Skin Cancer No family hx of         Do you have a first-degree relative with a history of the following:  A. Cancer of the breast or ovaries - No   B. Heart attack, heart pain, or stroke before the age of 55 - No  C. Unexplained death from drowning or car accident - No  D. Osteoporosis or any other significant bone health concerns - No    Social History     Tobacco Use    Smoking status: Never      "Passive exposure: Never    Smokeless tobacco: Never   Vaping Use    Vaping status: Never Used   Substance Use Topics    Alcohol use: Not Currently     Comment: former    Drug use: Yes     Types: Marijuana, Psilocybin     Comment: occasional use        History   Sexual Activity    Sexual activity: Not Currently    Partners: Male, Female    Birth control/ protection: I.U.D.             12/13/2023     2:22 PM 7/1/2024     8:50 AM 7/3/2024     8:38 AM   PHQ   PHQ-9 Total Score 6 5 5   Q9: Thoughts of better off dead/self-harm past 2 weeks Not at all Not at all Not at all        Immunization History   Administered Date(s) Administered    COVID-19 Bivalent 18+ (Moderna) 11/14/2022    COVID-19 MONOVALENT 12+ (Pfizer) 07/26/2021, 08/16/2021    COVID-19 Monovalent 12+ (Pfizer 2022) 04/08/2022    Influenza Vaccine >6 months,quad, PF 10/05/2022, 11/08/2023    Influenza Vaccine, 6+MO IM (QUADRIVALENT W/PRESERVATIVES) 11/01/2019, 10/13/2020    TDAP Vaccine (Boostrix) 06/17/2019        Health Maintenance Due   Topic Date Due    ADVANCE CARE PLANNING  Never done    DEPRESSION ACTION PLAN  Never done    HEPATITIS B IMMUNIZATION (2 of 3 - Hep B Twinrix 3-dose series) 11/15/2014    YEARLY PREVENTIVE VISIT  06/13/2023    COVID-19 Vaccine (5 - 2023-24 season) 09/01/2023    HPV TEST  05/19/2024    PAP  05/19/2024      Health Maintenance components reviewed - Seasonal Influenza vaccine status is up to date & Covid-19 vaccine status is up to date. Recently had COVID.    Diet: in general, a \"healthy\" diet      Sees a dentist regularly    LMP 6/22/2024    Review of Systems   Constitutional: Negative.    HENT: Negative.     Eyes: Negative.    Respiratory: Negative.     Cardiovascular: Negative.    Gastrointestinal: Negative.    Endocrine: Negative.    Breasts:  negative.    Genitourinary: Negative.    Musculoskeletal: Negative.    Skin:         History of vitiligo   Allergic/Immunologic: Negative.    Hematological: Negative.  " "  Psychiatric/Behavioral:  The patient is nervous/anxious.         Anxious about the condition of society, the world (socially conscious)            OBJECTIVE     /81 (BP Location: Left arm, Patient Position: Sitting, Cuff Size: Adult Regular)   Pulse 67   Temp 98.2  F (36.8  C) (Oral)   Resp 18   Ht 1.694 m (5' 6.7\")   Wt 82.1 kg (181 lb)   LMP 06/22/2024 (Exact Date)   SpO2 99%   BMI 28.60 kg/m         Estimated body mass index is 28.6 kg/m  as calculated from the following:    Height as of this encounter: 1.694 m (5' 6.7\").    Weight as of this encounter: 82.1 kg (181 lb).    Complete \"Weight Managment Plan\" in the progress note from the Adult Preventative or Medicare smartsets, use phrase .WEIGHTPLAN, or choose an option from Weight Management [employer] smartset below.        Labs:  Lab Results   Component Value Date    HGB 13.0 06/21/2022    CHOL 199 06/21/2022    TRIG 268 (H) 06/21/2022    HDL 41 (L) 06/21/2022     (L) 06/21/2022    BUN 7.3 06/21/2022    CO2 19 (L) 06/21/2022    TSH 3.23 06/21/2022       Physical Exam  Vitals and nursing note reviewed.   Constitutional:       Appearance: Normal appearance. She is normal weight.   HENT:      Head: Normocephalic and atraumatic.      Right Ear: Tympanic membrane, ear canal and external ear normal.      Left Ear: Tympanic membrane, ear canal and external ear normal.      Nose: Nose normal.      Mouth/Throat:      Mouth: Mucous membranes are moist.      Pharynx: Oropharynx is clear.   Eyes:      Extraocular Movements: Extraocular movements intact.      Conjunctiva/sclera: Conjunctivae normal.      Pupils: Pupils are equal, round, and reactive to light.   Cardiovascular:      Rate and Rhythm: Normal rate and regular rhythm.      Pulses: Normal pulses.      Heart sounds: Normal heart sounds.   Pulmonary:      Effort: Pulmonary effort is normal.      Breath sounds: Normal breath sounds.   Chest:   Breasts:     Right: Normal.      Left: Normal. "   Abdominal:      General: Abdomen is flat. Bowel sounds are normal.      Palpations: Abdomen is soft.   Genitourinary:     General: Normal vulva.      Exam position: Lithotomy position.      Alberto stage (genital): 5.      Vagina: Normal.      Cervix: Normal.      Uterus: Normal.       Adnexa: Right adnexa normal.   Musculoskeletal:         General: Normal range of motion.      Cervical back: Normal range of motion and neck supple.   Skin:     General: Skin is warm and dry.      Capillary Refill: Capillary refill takes less than 2 seconds.   Neurological:      General: No focal deficit present.      Mental Status: She is alert and oriented to person, place, and time.   Psychiatric:         Mood and Affect: Mood normal.         Behavior: Behavior normal.         Thought Content: Thought content normal.         Judgment: Judgment normal.               ASSESSMENT/PLAN     (Z00.00) Routine history and physical examination of adult  (primary encounter diagnosis)    Plan: Vitamin D Level, TSH with free T4 reflex,         Hemoglobin A1c, Lipid panel reflex to direct         LDL Fasting, CBC with platelets differential,         Comprehensive metabolic panel, Gynecologic         Cytology (Pap) and HPV - Recommended Age 30-65         Years    (Z20.2) Potential exposure to STD    Plan: HIV Antigen Antibody Combo Cascade, Chlamydia         trachomatis/Neisseria gonorrhoeae by PCR,         Multiplex Vaginal Panel by PCR, Treponema Abs w        Reflex to RPR and Titer       -Discussed/Reinforced healthy diety, lifestyle, exercise and safety.  -Recommended completion of routine dental and eye exam.  -Lab screenings completed today. Results pending.     Weight management plan: Discussed healthy diet and exercise guidelines     PAP (if applicable): Complete Date of Completion: today Follow-up Recommendation: as needed  CBE (if applicable): Complete Date of Completion: today Follow-up Recommendation: one year  Mammogram (if  applicable): Complete Date of Completion: 6/13/23 Follow-up Recommendation: ordered today  Colon cancer screening (if applicable):  discussed  Follow-up Recommendation: at age 45 years  Cholesterol Screening (if applicable): Complete Date of Completion: today Follow-up Recommendation: as needed  Diabetes Screening (if applicable): Complete Date of Completion: today Follow-up Recommendation: as needed  Thyroid Screening (if applicable): Complete Date of Completion: today Follow-up Recommendation: as needed  Depression Screening (if applicable): Complete Date of Completion: today Follow-up Recommendation: one year    (Z00.00) Routine history and physical examination of adult  (primary encounter diagnosis)  Plan: Vitamin D Level, TSH with free T4 reflex,         Hemoglobin A1c, Lipid panel reflex to direct         LDL Fasting, CBC with platelets differential,         Comprehensive metabolic panel, Gynecologic         Cytology (Pap) and HPV - Recommended Age 30-65         Years, MA         Screen Bilateral w/Phong, Gynecologic Cytology         (PAP)           (Z20.2) Potential exposure to STD  Plan: HIV Antigen Antibody Combo Cascade, Chlamydia         trachomatis/Neisseria gonorrhoeae by PCR,         Multiplex Vaginal Panel by PCR, Treponema Abs w        Reflex to RPR and Titer, Treponema Abs w Reflex        to RPR and Titer, HIV Antigen Antibody Combo        Follow-Up:  Follow up in one year, or sooner if needed.     Mindy will let me know if she needs anything else from us; anxiety, stress.    Patient engaged in their plan of care. Patient verbalized understanding and agreed with the final plan.  AVS printed and given to patient.    Mary Schwartz NP   [facility]  [facility] [address] [address]  [phone]

## 2024-07-16 ENCOUNTER — LAB (OUTPATIENT)
Dept: LAB | Facility: CLINIC | Age: 44
End: 2024-07-16
Payer: COMMERCIAL

## 2024-07-16 DIAGNOSIS — Z00.00 ROUTINE HISTORY AND PHYSICAL EXAMINATION OF ADULT: ICD-10-CM

## 2024-07-16 DIAGNOSIS — Z20.2 POTENTIAL EXPOSURE TO STD: ICD-10-CM

## 2024-07-16 LAB
BASOPHILS # BLD AUTO: 0.1 10E3/UL (ref 0–0.2)
BASOPHILS NFR BLD AUTO: 1 %
C TRACH DNA SPEC QL PROBE+SIG AMP: NEGATIVE
EOSINOPHIL # BLD AUTO: 0.1 10E3/UL (ref 0–0.7)
EOSINOPHIL NFR BLD AUTO: 1 %
ERYTHROCYTE [DISTWIDTH] IN BLOOD BY AUTOMATED COUNT: 12.6 % (ref 10–15)
HBA1C MFR BLD: 5.5 %
HCT VFR BLD AUTO: 38.3 % (ref 35–47)
HGB BLD-MCNC: 13 G/DL (ref 11.7–15.7)
HPV HR 12 DNA CVX QL NAA+PROBE: NEGATIVE
HPV16 DNA CVX QL NAA+PROBE: NEGATIVE
HPV18 DNA CVX QL NAA+PROBE: NEGATIVE
HUMAN PAPILLOMA VIRUS FINAL DIAGNOSIS: NORMAL
IMM GRANULOCYTES # BLD: 0 10E3/UL
IMM GRANULOCYTES NFR BLD: 0 %
LYMPHOCYTES # BLD AUTO: 3.4 10E3/UL (ref 0.8–5.3)
LYMPHOCYTES NFR BLD AUTO: 36 %
MCH RBC QN AUTO: 30 PG (ref 26.5–33)
MCHC RBC AUTO-ENTMCNC: 33.9 G/DL (ref 31.5–36.5)
MCV RBC AUTO: 88 FL (ref 78–100)
MONOCYTES # BLD AUTO: 0.6 10E3/UL (ref 0–1.3)
MONOCYTES NFR BLD AUTO: 6 %
N GONORRHOEA DNA SPEC QL NAA+PROBE: NEGATIVE
NEUTROPHILS # BLD AUTO: 5.5 10E3/UL (ref 1.6–8.3)
NEUTROPHILS NFR BLD AUTO: 56 %
NRBC # BLD AUTO: 0 10E3/UL
NRBC BLD AUTO-RTO: 0 /100
PLATELET # BLD AUTO: 316 10E3/UL (ref 150–450)
RBC # BLD AUTO: 4.34 10E6/UL (ref 3.8–5.2)
WBC # BLD AUTO: 9.6 10E3/UL (ref 4–11)

## 2024-07-16 PROCEDURE — 80053 COMPREHEN METABOLIC PANEL: CPT | Mod: ORL | Performed by: NURSE PRACTITIONER

## 2024-07-16 PROCEDURE — 80061 LIPID PANEL: CPT | Mod: ORL | Performed by: NURSE PRACTITIONER

## 2024-07-16 PROCEDURE — 83036 HEMOGLOBIN GLYCOSYLATED A1C: CPT | Mod: ORL | Performed by: NURSE PRACTITIONER

## 2024-07-16 PROCEDURE — 86780 TREPONEMA PALLIDUM: CPT | Mod: ORL | Performed by: NURSE PRACTITIONER

## 2024-07-16 PROCEDURE — 82306 VITAMIN D 25 HYDROXY: CPT | Mod: ORL | Performed by: NURSE PRACTITIONER

## 2024-07-16 PROCEDURE — 84443 ASSAY THYROID STIM HORMONE: CPT | Mod: ORL | Performed by: NURSE PRACTITIONER

## 2024-07-16 PROCEDURE — 87389 HIV-1 AG W/HIV-1&-2 AB AG IA: CPT | Mod: ORL | Performed by: NURSE PRACTITIONER

## 2024-07-16 PROCEDURE — 85025 COMPLETE CBC W/AUTO DIFF WBC: CPT | Mod: ORL | Performed by: NURSE PRACTITIONER

## 2024-07-17 LAB
ALBUMIN SERPL BCG-MCNC: 4.2 G/DL (ref 3.5–5.2)
ALP SERPL-CCNC: 69 U/L (ref 40–150)
ALT SERPL W P-5'-P-CCNC: 14 U/L (ref 0–50)
ANION GAP SERPL CALCULATED.3IONS-SCNC: 11 MMOL/L (ref 7–15)
AST SERPL W P-5'-P-CCNC: 20 U/L (ref 0–45)
BILIRUB SERPL-MCNC: 0.5 MG/DL
BUN SERPL-MCNC: 8.7 MG/DL (ref 6–20)
CALCIUM SERPL-MCNC: 9 MG/DL (ref 8.8–10.4)
CHLORIDE SERPL-SCNC: 106 MMOL/L (ref 98–107)
CHOLEST SERPL-MCNC: 178 MG/DL
CREAT SERPL-MCNC: 0.94 MG/DL (ref 0.51–0.95)
EGFRCR SERPLBLD CKD-EPI 2021: 76 ML/MIN/1.73M2
FASTING STATUS PATIENT QL REPORTED: ABNORMAL
GLUCOSE SERPL-MCNC: 86 MG/DL (ref 70–99)
HCO3 SERPL-SCNC: 21 MMOL/L (ref 22–29)
HDLC SERPL-MCNC: 38 MG/DL
HIV 1+2 AB+HIV1 P24 AG SERPL QL IA: NONREACTIVE
LDLC SERPL CALC-MCNC: 119 MG/DL
NONHDLC SERPL-MCNC: 140 MG/DL
POTASSIUM SERPL-SCNC: 4.1 MMOL/L (ref 3.4–5.3)
PROT SERPL-MCNC: 7.4 G/DL (ref 6.4–8.3)
SODIUM SERPL-SCNC: 138 MMOL/L (ref 135–145)
T PALLIDUM AB SER QL: NONREACTIVE
TRIGL SERPL-MCNC: 107 MG/DL
TSH SERPL DL<=0.005 MIU/L-ACNC: 2.53 UIU/ML (ref 0.3–4.2)
VIT D+METAB SERPL-MCNC: 36 NG/ML (ref 20–50)

## 2024-07-18 LAB
BKR LAB AP GYN ADEQUACY: NORMAL
BKR LAB AP GYN INTERPRETATION: NORMAL
BKR LAB AP LMP: NORMAL
BKR LAB AP PREVIOUS ABNORMAL: NORMAL
PATH REPORT.COMMENTS IMP SPEC: NORMAL
PATH REPORT.COMMENTS IMP SPEC: NORMAL
PATH REPORT.RELEVANT HX SPEC: NORMAL

## 2024-08-27 DIAGNOSIS — F41.9 ANXIETY: ICD-10-CM

## 2024-08-27 NOTE — TELEPHONE ENCOUNTER
[facility] phone call message - patient requesting a refill:    Full Medication Name: alprazolam (XANAX)   Dose: 0.25 MG tablet     Pharmacy confirmed as     [id number] - [facility] [address] [facility]  [address] [facility] [address]  Phone: [phone] Fax: [phone]  : Yes    Medication tab checked to see if medication has been sent  Yes    Is patient out of medication: Yes    Did patient contact the pharmacy? No: no refills    Additional Comments:      Would the patient like to be updated? Send patient a [employer] message  If a phone call, is it okay to leave a detailed message?: Not Applicable  at Not Applicable      Primary language: English      needed? No    Call taken on August 27, 2024 at 3:03 PM by Magda Padilla, EMT    Route to P [facility] MED REFILLS TEAM     ++If medication is a controlled substance, please route directly to the provider++

## 2024-08-28 RX ORDER — ALPRAZOLAM 0.25 MG
0.25 TABLET ORAL 3 TIMES DAILY PRN
Qty: 45 TABLET | Refills: 0 | Status: SHIPPED | OUTPATIENT
Start: 2024-08-28

## 2024-10-25 DIAGNOSIS — B00.9 HSV (HERPES SIMPLEX VIRUS) INFECTION: ICD-10-CM

## 2024-10-25 DIAGNOSIS — F90.0 ADHD (ATTENTION DEFICIT HYPERACTIVITY DISORDER), INATTENTIVE TYPE: ICD-10-CM

## 2024-10-25 DIAGNOSIS — F41.9 ANXIETY: ICD-10-CM

## 2024-10-25 NOTE — TELEPHONE ENCOUNTER
[facility] phone call message - patient requesting a refill:    Full Medication Name: Xanax, Wellbutrin and Valtrex    Dose: See Chart     Pharmacy confirmed as    [id number] - [facility] [address] [facility]  [address] [facility] [address]  Phone: [phone] Fax: [phone]  : Yes    Medication tab checked to see if medication has been sent  Yes    Is patient out of medication:  Wants to re-up until she can get in to a new primary    Did patient contact the pharmacy? No: No refills    Additional Comments: N/A     Would the patient like to be updated? Send patient a [employer] message    Primary language: English      needed? No    Call taken on October 25, 2024 at 9:45 AM by Abigail Edmondson CMA    Route to P [facility] MED REFILLS TEAM     ++If medication is a controlled substance, please route directly to the provider++

## 2024-10-30 RX ORDER — VALACYCLOVIR HYDROCHLORIDE 1 G/1
1000 TABLET, FILM COATED ORAL 2 TIMES DAILY
Qty: 20 TABLET | Refills: 0 | Status: SHIPPED | OUTPATIENT
Start: 2024-10-30

## 2024-10-30 RX ORDER — BUPROPION HYDROCHLORIDE 150 MG/1
150 TABLET ORAL
Qty: 45 TABLET | Refills: 3 | Status: SHIPPED | OUTPATIENT
Start: 2024-10-30

## 2024-10-30 RX ORDER — ALPRAZOLAM 0.25 MG/1
0.25 TABLET ORAL 3 TIMES DAILY PRN
Qty: 45 TABLET | Refills: 0 | Status: SHIPPED | OUTPATIENT
Start: 2024-10-30

## 2024-10-30 RX ORDER — BUPROPION HYDROCHLORIDE 300 MG/1
300 TABLET ORAL
Qty: 45 TABLET | Refills: 3 | Status: SHIPPED | OUTPATIENT
Start: 2024-10-30

## 2024-12-19 ENCOUNTER — PATIENT OUTREACH (OUTPATIENT)
Dept: CARE COORDINATION | Facility: CLINIC | Age: 44
End: 2024-12-19
Payer: COMMERCIAL

## 2025-01-01 ASSESSMENT — ANXIETY QUESTIONNAIRES
5. BEING SO RESTLESS THAT IT IS HARD TO SIT STILL: NOT AT ALL
6. BECOMING EASILY ANNOYED OR IRRITABLE: NOT AT ALL
IF YOU CHECKED OFF ANY PROBLEMS ON THIS QUESTIONNAIRE, HOW DIFFICULT HAVE THESE PROBLEMS MADE IT FOR YOU TO DO YOUR WORK, TAKE CARE OF THINGS AT HOME, OR GET ALONG WITH OTHER PEOPLE: NOT DIFFICULT AT ALL
GAD7 TOTAL SCORE: 2
GAD7 TOTAL SCORE: 2
1. FEELING NERVOUS, ANXIOUS, OR ON EDGE: NOT AT ALL
4. TROUBLE RELAXING: NOT AT ALL
7. FEELING AFRAID AS IF SOMETHING AWFUL MIGHT HAPPEN: SEVERAL DAYS
3. WORRYING TOO MUCH ABOUT DIFFERENT THINGS: SEVERAL DAYS
7. FEELING AFRAID AS IF SOMETHING AWFUL MIGHT HAPPEN: SEVERAL DAYS
GAD7 TOTAL SCORE: 2
2. NOT BEING ABLE TO STOP OR CONTROL WORRYING: NOT AT ALL
8. IF YOU CHECKED OFF ANY PROBLEMS, HOW DIFFICULT HAVE THESE MADE IT FOR YOU TO DO YOUR WORK, TAKE CARE OF THINGS AT HOME, OR GET ALONG WITH OTHER PEOPLE?: NOT DIFFICULT AT ALL

## 2025-01-01 ASSESSMENT — PATIENT HEALTH QUESTIONNAIRE - PHQ9
10. IF YOU CHECKED OFF ANY PROBLEMS, HOW DIFFICULT HAVE THESE PROBLEMS MADE IT FOR YOU TO DO YOUR WORK, TAKE CARE OF THINGS AT HOME, OR GET ALONG WITH OTHER PEOPLE: SOMEWHAT DIFFICULT
SUM OF ALL RESPONSES TO PHQ QUESTIONS 1-9: 6
SUM OF ALL RESPONSES TO PHQ QUESTIONS 1-9: 6

## 2025-01-02 ENCOUNTER — OFFICE VISIT (OUTPATIENT)
Dept: FAMILY MEDICINE | Facility: CLINIC | Age: 45
End: 2025-01-02
Payer: COMMERCIAL

## 2025-01-02 VITALS
SYSTOLIC BLOOD PRESSURE: 120 MMHG | OXYGEN SATURATION: 97 % | HEART RATE: 72 BPM | WEIGHT: 185 LBS | RESPIRATION RATE: 17 BRPM | BODY MASS INDEX: 29.24 KG/M2 | TEMPERATURE: 97.6 F | DIASTOLIC BLOOD PRESSURE: 79 MMHG

## 2025-01-02 DIAGNOSIS — F90.0 ADHD (ATTENTION DEFICIT HYPERACTIVITY DISORDER), INATTENTIVE TYPE: Primary | ICD-10-CM

## 2025-01-02 DIAGNOSIS — F41.1 GAD (GENERALIZED ANXIETY DISORDER): ICD-10-CM

## 2025-01-02 DIAGNOSIS — F33.1 MODERATE EPISODE OF RECURRENT MAJOR DEPRESSIVE DISORDER (H): ICD-10-CM

## 2025-01-02 PROBLEM — F32.A DEPRESSION, UNSPECIFIED DEPRESSION TYPE: Status: RESOLVED | Noted: 2022-12-14 | Resolved: 2025-01-02

## 2025-01-02 RX ORDER — FAMOTIDINE 20 MG
TABLET ORAL
COMMUNITY

## 2025-01-02 RX ORDER — DEXTROAMPHETAMINE SACCHARATE, AMPHETAMINE ASPARTATE, DEXTROAMPHETAMINE SULFATE AND AMPHETAMINE SULFATE 2.5; 2.5; 2.5; 2.5 MG/1; MG/1; MG/1; MG/1
10 TABLET ORAL DAILY
Qty: 30 TABLET | Refills: 0 | Status: SHIPPED | OUTPATIENT
Start: 2025-01-02

## 2025-01-02 RX ORDER — BUPROPION HYDROCHLORIDE 150 MG/1
150 TABLET ORAL
Qty: 45 TABLET | Refills: 3 | Status: SHIPPED | OUTPATIENT
Start: 2025-01-02

## 2025-01-02 RX ORDER — BUPROPION HYDROCHLORIDE 300 MG/1
300 TABLET ORAL
Qty: 45 TABLET | Refills: 3 | Status: SHIPPED | OUTPATIENT
Start: 2025-01-02

## 2025-01-02 ASSESSMENT — PAIN SCALES - GENERAL: PAINLEVEL_OUTOF10: NO PAIN (0)

## 2025-01-02 NOTE — NURSING NOTE
44 year old    Chief Complaint   Patient presents with    [facility]    Medication Request     Is looking to start ADHD medication     Refill Request        Blood pressure 120/79, pulse 72, temperature 97.6  F (36.4  C), temperature source Skin, resp. rate 17, weight 83.9 kg (185 lb), last menstrual period 01/01/2025, SpO2 97%. Body mass index is 29.24 kg/m .    Patient Active Problem List   Diagnosis    ADHD (attention deficit hyperactivity disorder), inattentive type    Depression, unspecified depression type    Moderate episode of recurrent major depressive disorder (H)          Wt Readings from Last 2 Encounters:   01/02/25 83.9 kg (185 lb)   07/15/24 82.1 kg (181 lb)       BP Readings from Last 3 Encounters:   01/02/25 120/79   07/15/24 120/81   03/19/24 120/62             Current Outpatient Medications   Medication Sig Dispense Refill    ALPRAZolam (XANAX) 0.25 MG tablet Take 1 tablet (0.25 mg) by mouth 3 times daily as needed for anxiety. 45 tablet 0    buPROPion (WELLBUTRIN XL) 150 MG 24 hr tablet Take 1 tablet (150 mg) by mouth every 48 hours. Every other day 45 tablet 3    buPROPion (WELLBUTRIN XL) 300 MG 24 hr tablet Take 1 tablet (300 mg) by mouth every 48 hours. Every other day 45 tablet 3    Probiotic Product (UP4 PROBIOTICS PO)       valACYclovir (VALTREX) 1000 mg tablet Take 1 tablet (1,000 mg) by mouth 2 times daily. 20 tablet 0    hydrocortisone (ANUSOL-HC) 25 MG suppository Place 1 suppository (25 mg) rectally 2 times daily (Patient not taking: Reported on 1/2/2025) 24 suppository 0    omeprazole (PRILOSEC) 40 MG DR capsule Take 1 capsule (40 mg) by mouth daily (Patient not taking: Reported on 1/2/2025) 60 capsule 1     No current facility-administered medications for this visit.          Social History     Tobacco Use    Smoking status: Never     Passive exposure: Never    Smokeless tobacco: Never   Vaping Use    Vaping status: Never Used   Substance Use Topics    Alcohol use: Not Currently  "    Comment: former    Drug use: Yes     Types: Marijuana, Psilocybin     Comment: occasional use          Health Maintenance Due   Topic Date Due    ADVANCE CARE PLANNING  Never done    DEPRESSION ACTION PLAN  Never done    HEPATITIS B IMMUNIZATION (2 of 3 - Hep B Twinrix 3-dose series) 11/15/2014        No results found for: \"PAP\"        January 2, 2025 1:45 PM        "

## 2025-01-02 NOTE — PATIENT INSTRUCTIONS
Nice to meet you today!    ADHD - try using 10 mg immediate acting Adderall as needed for attention/focus.    The Menopause Manifesto - Dr. Lizbet De La Fuente  It's Not Hysteria - Faith Fuentes

## 2025-01-02 NOTE — PROGRESS NOTES
"  Assessment & Plan     Mindy was seen today for establish care, medication request and refill request.    Diagnoses and all orders for this visit:    ADHD (attention deficit hyperactivity disorder), inattentive type  -     buPROPion (WELLBUTRIN XL) 300 MG 24 hr tablet; Take 1 tablet (300 mg) by mouth every 48 hours. Every other day  -     buPROPion (WELLBUTRIN XL) 150 MG 24 hr tablet; Take 1 tablet (150 mg) by mouth every 48 hours. Every other day  -     amphetamine-dextroamphetamine (ADDERALL) 10 MG tablet; Take 1 tablet (10 mg) by mouth daily.    DIONNE (generalized anxiety disorder)    Moderate episode of recurrent major depressive disorder (H)      Discussed role of stimulants today in treatment of ADHD  Discussed potential side effects, including appetite suppression, elevated blood pressure, anxiety, insomnia, irritability  Discussed controlled substance, need q6 month visits once on stable dose, annual urine drug screening. No early refills of medication  Patient in agreement with plan    Will start with immediate release medication given lower stress with work - plan that will need addition of longer acting medication prior to next cycle of work  Follow-up in 1 month.     The longitudinal plan of care for the diagnosis(es)/condition(s) as documented were addressed during this visit. Due to the added complexity in care, I will continue to support Mindy in the subsequent management and with ongoing continuity of care.     Subjective   Mindy is a 44 year old, presenting for the following health issues:  Establish Care, Medication Request (Is looking to start ADHD medication ), and Refill Request    HPI     Wondering about baseline hormones    Bupropion - takes 150 mg and 300 mg alternating every other day  Neuropsych testing - MDD, DIONNE, ADHD    Started bupropion at that time.  Working well for depression.    Alprazolam  - takes as needed. Uses for flying, sleep.  Anxiety feels \"not non-existant\" but feels good. " "Working on more behavioral control.  Work - takes 3-4x/month. Uses rarely.    Acid reflux -worse over holiday season due to diet  Had endoscopy - expanded Schatzki ring in March, symptoms have improved  Hasn't needed omeprazole since then    Valtrex - hx HSV infection. Usually gets outbreaks in the skin. Takes 2-3x in spring.    ADHD -   Had symptoms starting back in 3rd grade \"performance didn't match intelligence\"  Did testing in 2022  Has done therapy, working on strategies    Bupropion was first/only medication that was taken for this    Sauna, cold plunge, reiki, massage, acupuncture.    ADHD symptoms - overall very happy with how the Wellbutrin is working  Feels like she needs help with focus, motivation  Sticking to tasks, planning ahead    Perimenopause:  Wanting to prepare  Periods are regular. Periods seem to be very light          Objective    /79 (BP Location: Left arm, Patient Position: Sitting, Cuff Size: Adult Regular)   Pulse 72   Temp 97.6  F (36.4  C) (Skin)   Resp 17   Wt 83.9 kg (185 lb)   LMP 01/01/2025 (Exact Date)   SpO2 97%   BMI 29.24 kg/m    Body mass index is 29.24 kg/m .  Physical Exam   General: Well-appearing in NAD   HEENT: Normocephalic, atraumatic. Mucus membranes moist.   Resp: No respiratory distress   MSK: No peripheral edema.   Skin: No rashes or lesions noted.   Psych: Appropriate affect. Mood is good       Signed Electronically by: Leydi Elizabeth MD    "

## 2025-01-14 DIAGNOSIS — F90.0 ADHD (ATTENTION DEFICIT HYPERACTIVITY DISORDER), INATTENTIVE TYPE: ICD-10-CM

## 2025-01-14 RX ORDER — BUPROPION HYDROCHLORIDE 150 MG/1
150 TABLET ORAL
Qty: 45 TABLET | Refills: 3 | Status: SHIPPED | OUTPATIENT
Start: 2025-01-14

## 2025-01-14 RX ORDER — DEXTROAMPHETAMINE SACCHARATE, AMPHETAMINE ASPARTATE, DEXTROAMPHETAMINE SULFATE AND AMPHETAMINE SULFATE 5; 5; 5; 5 MG/1; MG/1; MG/1; MG/1
10 TABLET ORAL DAILY
Qty: 15 TABLET | Refills: 0 | Status: SHIPPED | OUTPATIENT
Start: 2025-01-14

## 2025-01-14 RX ORDER — BUPROPION HYDROCHLORIDE 300 MG/1
300 TABLET ORAL
Qty: 45 TABLET | Refills: 3 | Status: SHIPPED | OUTPATIENT
Start: 2025-01-14

## 2025-01-14 NOTE — TELEPHONE ENCOUNTER
Rx sent to alternative pharmacy as  noted.     Mindy was seen today for medication request.    Diagnoses and all orders for this visit:    ADHD (attention deficit hyperactivity disorder), inattentive type  -     amphetamine-dextroamphetamine (ADDERALL) 20 MG tablet; Take 0.5 tablets (10 mg) by mouth daily.  -     buPROPion (WELLBUTRIN XL) 150 MG 24 hr tablet; Take 1 tablet (150 mg) by mouth every 48 hours. Every other day  -     buPROPion (WELLBUTRIN XL) 300 MG 24 hr tablet; Take 1 tablet (300 mg) by mouth every 48 hours. Every other day      Isak Sandoval MD  1:01 PM, January 14, 2025

## 2025-01-14 NOTE — TELEPHONE ENCOUNTER
Please resend to [facility] as [facility] is out of stock on the adderall.    Patient is leaving town tomorrow morning.    Kristin

## 2025-03-04 ENCOUNTER — OFFICE VISIT (OUTPATIENT)
Dept: FAMILY MEDICINE | Facility: CLINIC | Age: 45
End: 2025-03-04
Payer: COMMERCIAL

## 2025-03-04 VITALS
BODY MASS INDEX: 28.92 KG/M2 | OXYGEN SATURATION: 96 % | HEART RATE: 74 BPM | TEMPERATURE: 98.3 F | DIASTOLIC BLOOD PRESSURE: 72 MMHG | RESPIRATION RATE: 14 BRPM | SYSTOLIC BLOOD PRESSURE: 106 MMHG | WEIGHT: 183 LBS

## 2025-03-04 DIAGNOSIS — F90.0 ADHD (ATTENTION DEFICIT HYPERACTIVITY DISORDER), INATTENTIVE TYPE: Primary | ICD-10-CM

## 2025-03-04 DIAGNOSIS — F41.9 ANXIETY: ICD-10-CM

## 2025-03-04 LAB
AMPHETAMINES UR QL: DETECTED
BARBITURATES UR QL SCN: NOT DETECTED
BENZODIAZ UR QL SCN: NOT DETECTED
BUPRENORPHINE UR QL: NOT DETECTED
CANNABINOIDS UR QL: NOT DETECTED
COCAINE UR QL SCN: NOT DETECTED
D-METHAMPHET UR QL: NOT DETECTED
METHADONE UR QL SCN: NOT DETECTED
OPIATES UR QL SCN: NOT DETECTED
OXYCODONE UR QL SCN: NOT DETECTED
PCP UR QL SCN: NOT DETECTED
TRICYCLICS UR QL SCN: NOT DETECTED

## 2025-03-04 RX ORDER — DEXTROAMPHETAMINE SACCHARATE, AMPHETAMINE ASPARTATE, DEXTROAMPHETAMINE SULFATE AND AMPHETAMINE SULFATE 5; 5; 5; 5 MG/1; MG/1; MG/1; MG/1
TABLET ORAL
Qty: 30 TABLET | Refills: 0 | Status: SHIPPED | OUTPATIENT
Start: 2025-03-04

## 2025-03-04 RX ORDER — ALPRAZOLAM 0.25 MG
0.25 TABLET ORAL 3 TIMES DAILY PRN
Qty: 45 TABLET | Refills: 0 | Status: SHIPPED | OUTPATIENT
Start: 2025-03-04

## 2025-03-04 ASSESSMENT — ANXIETY QUESTIONNAIRES
6. BECOMING EASILY ANNOYED OR IRRITABLE: SEVERAL DAYS
2. NOT BEING ABLE TO STOP OR CONTROL WORRYING: SEVERAL DAYS
GAD7 TOTAL SCORE: 7
7. FEELING AFRAID AS IF SOMETHING AWFUL MIGHT HAPPEN: SEVERAL DAYS
1. FEELING NERVOUS, ANXIOUS, OR ON EDGE: SEVERAL DAYS
4. TROUBLE RELAXING: SEVERAL DAYS
5. BEING SO RESTLESS THAT IT IS HARD TO SIT STILL: SEVERAL DAYS
GAD7 TOTAL SCORE: 7
IF YOU CHECKED OFF ANY PROBLEMS ON THIS QUESTIONNAIRE, HOW DIFFICULT HAVE THESE PROBLEMS MADE IT FOR YOU TO DO YOUR WORK, TAKE CARE OF THINGS AT HOME, OR GET ALONG WITH OTHER PEOPLE: SOMEWHAT DIFFICULT
3. WORRYING TOO MUCH ABOUT DIFFERENT THINGS: SEVERAL DAYS
8. IF YOU CHECKED OFF ANY PROBLEMS, HOW DIFFICULT HAVE THESE MADE IT FOR YOU TO DO YOUR WORK, TAKE CARE OF THINGS AT HOME, OR GET ALONG WITH OTHER PEOPLE?: SOMEWHAT DIFFICULT
7. FEELING AFRAID AS IF SOMETHING AWFUL MIGHT HAPPEN: SEVERAL DAYS
GAD7 TOTAL SCORE: 7

## 2025-03-04 NOTE — PROGRESS NOTES
Assessment & Plan     Mindy was seen today for recheck medication.    Diagnoses and all orders for this visit:    ADHD (attention deficit hyperactivity disorder), inattentive type  -     amphetamine-dextroamphetamine (ADDERALL) 20 MG tablet; Take 5-10 mg daily as needed for attention/focus  -     Urine Drug Screen Clinic; Future    Anxiety  -     ALPRAZolam (XANAX) 0.25 MG tablet; Take 1 tablet (0.25 mg) by mouth 3 times daily as needed for anxiety.        Continue Adderall 5-10 mg daily as needed  UDS and CSA updated today    Alprazolam refilled - reviewed risks of dependency/tolerance with this.    Follow-up in 4 months for physical    The longitudinal plan of care for the diagnosis(es)/condition(s) as documented were addressed during this visit. Due to the added complexity in care, I will continue to support Mindy in the subsequent management and with ongoing continuity of care.     Subjective   Mindy is a 44 year old, presenting for the following health issues:  Recheck Medication (Adderall)    HPI      ADHD follow-up  Current medication: Adderall 10 mg IR  Dosing schedule/med holidays: taking as needed    Effectiveness: at first noticed a very significant difference. Feels more awake. Feels some difference on days she doesn't take it - able to establish new habits  Side effect concerns: reduced appetite  Appetite: reduced when she takes higher dose  Sleep: sleep has been disrupted, but due to stressors/situational. Has been taking alprazolam more to fall asleep    Mood/mental health: stress has been high with     Previous medication trials:  None            7/3/2024     8:38 AM 1/1/2025     7:32 PM 3/4/2025     1:22 PM   PHQ   PHQ-9 Total Score 5 6  6    Q9: Thoughts of better off dead/self-harm past 2 weeks Not at all Not at all Not at all       Patient-reported           7/3/2024     8:38 AM 1/1/2025     7:33 PM 3/4/2025     1:22 PM   DIONNE-7 SCORE   Total Score 4 (minimal anxiety) 2 (minimal anxiety) 7 (mild  anxiety)   Total Score 4 2  7        Patient-reported             Objective    /72 (BP Location: Left arm, Patient Position: Sitting, Cuff Size: Adult Large)   Pulse 74   Temp 98.3  F (36.8  C) (Temporal)   Resp 14   Wt 83 kg (183 lb)   SpO2 96%   BMI 28.92 kg/m    Body mass index is 28.92 kg/m .  Physical Exam   General: Well-appearing in NAD   HEENT: Normocephalic, atraumatic. Mucus membranes moist.   Resp: No respiratory distress   MSK: No peripheral edema.   Skin: No rashes or lesions noted.   Psych: Appropriate affect. Mood is good           Signed Electronically by: Leydi Elizabeth MD

## 2025-03-04 NOTE — NURSING NOTE
44 year old  Chief Complaint   Patient presents with    Recheck Medication     Adderall       Blood pressure 106/72, pulse 74, temperature 98.3  F (36.8  C), temperature source Temporal, resp. rate 14, weight 83 kg (183 lb), SpO2 96%. Body mass index is 28.92 kg/m .  Patient Active Problem List   Diagnosis    ADHD (attention deficit hyperactivity disorder), inattentive type    Moderate episode of recurrent major depressive disorder (H)    DIONNE (generalized anxiety disorder)       Wt Readings from Last 2 Encounters:   03/04/25 83 kg (183 lb)   01/02/25 83.9 kg (185 lb)     BP Readings from Last 3 Encounters:   03/04/25 106/72   01/02/25 120/79   07/15/24 120/81         Current Outpatient Medications   Medication Sig Dispense Refill    ALPRAZolam (XANAX) 0.25 MG tablet Take 1 tablet (0.25 mg) by mouth 3 times daily as needed for anxiety. 45 tablet 0    amphetamine-dextroamphetamine (ADDERALL) 20 MG tablet Take 0.5 tablets (10 mg) by mouth daily. 15 tablet 0    buPROPion (WELLBUTRIN XL) 150 MG 24 hr tablet Take 1 tablet (150 mg) by mouth every 48 hours. Every other day 45 tablet 3    buPROPion (WELLBUTRIN XL) 300 MG 24 hr tablet Take 1 tablet (300 mg) by mouth every 48 hours. Every other day 45 tablet 3    Probiotic Product (UP4 PROBIOTICS PO)       valACYclovir (VALTREX) 1000 mg tablet Take 1 tablet (1,000 mg) by mouth 2 times daily. 20 tablet 0    Vitamin D, Cholecalciferol, 25 MCG (1000 UT) CAPS Take by mouth.      omeprazole (PRILOSEC) 40 MG DR capsule Take 1 capsule (40 mg) by mouth daily (Patient not taking: Reported on 3/4/2025) 60 capsule 1     No current facility-administered medications for this visit.       Social History     Tobacco Use    Smoking status: Never     Passive exposure: Never    Smokeless tobacco: Never   Vaping Use    Vaping status: Never Used   Substance Use Topics    Alcohol use: Not Currently     Comment: former    Drug use: Yes     Types: Marijuana, Psilocybin     Comment: occasional use  "      Health Maintenance Due   Topic Date Due    ADVANCE CARE PLANNING  Never done    DEPRESSION ACTION PLAN  Never done    HEPATITIS B IMMUNIZATION (2 of 3 - Hep B Twinrix 3-dose series) 11/15/2014       No results found for: \"PAP\"      March 4, 2025 2:29 PM    "

## 2025-03-04 NOTE — PATIENT INSTRUCTIONS
Good to see you today!    Refills sent to pharmacy.    Follow-up in July for medication check and physical.    Let me know sooner with any concerns.  
Family

## 2025-03-04 NOTE — LETTER
[facility]  03/04/25  Patient: Vidhi Tovar  YOB: 1980  Medical Record Number: [id number]                                                                                  Non-Opioid Controlled Substance Agreement    This is an agreement between you and your provider regarding safe and appropriate use of controlled substances prescribed by your care team. Controlled substances are?medicines that can cause physical and mental dependence (abuse).     There are strict laws about having and using these medicines. We here at [facility] are  committed to working with you in your efforts to get better. To support you in this work, we'll help you schedule regular office appointments for medicine refills. If we must cancel or change your appointment for any reason, we'll make sure you have enough medicine to last until your next appointment.     As a Provider, I will:   Listen carefully to your concerns while treating you with respect.   Recommend a treatment plan that I believe is in your best interest and may involve therapies other than medicine.    Talk with you often about the possible benefits and the risk of harm of any medicine that we prescribe for you.  Assess the safety of this medicine and check how well it works.    Provide a plan on how to taper (discontinue or go off) using this medicine if the decision is made to stop its use.      ::  As a Patient, I understand controlled substances:     Are prescribed by my care provider to help me function or work and manage my condition(s).?  Are strong medicines and can cause serious side effects.     Need to be taken exactly as prescribed.?Combining controlled substances with certain medicines or chemicals (such as illegal drugs, alcohol, sedatives, sleeping pills, and benzodiazepines) can be dangerous or even fatal.? If I stop taking my medicines suddenly, I may have severe withdrawal symptoms.     The risks, benefits, and side effects of  these medicine(s) were explained to me. I agree that:    I will take part in other treatments as advised by my care team. This may be psychiatry or counseling, physical therapy, behavioral therapy, group treatment or a referral to specialist.    I will keep all my appointments and understand this is part of the monitoring of controlled substances.?My care team may require an office visit for EVERY controlled substance refill. If I miss appointments or don t follow instructions, my care team may stop my medicine    I will take my medicines as prescribed. I will not change the dose or schedule unless my care team tells me to. There will be no refills if I run out early.      I may be asked to come to the clinic and complete a urine drug test or complete a pill count. If I don t give a urine sample or participate in a pill count, the care team may stop my medicine.    I will only receive controlled substance prescriptions from this clinic. If I am treated by another provider, I will tell them that I am taking controlled substances and that I have a treatment agreement with this provider. I will inform my [facility] care team within one business day if I am given a prescription for any controlled substance by another healthcare provider. My [facility] care team can contact other providers and pharmacists about my use of any medicines.    It is up to me to make sure that I don't run out of my medicines on weekends or holidays.?If my care team is willing to refill my prescription without a visit, I must request refills only during office hours. Refills may take up to 3 business days to process. I will use one pharmacy to fill all my controlled substance prescriptions. I will notify the clinic about any changes to my insurance or medicine availability.    I am responsible for my prescriptions. If the medicine/prescription is lost, stolen or destroyed, it will not be replaced.?I also agree not to share  controlled substance medicines with anyone.     I am aware I should not use any illegal or recreational drugs. I agree not to drink alcohol unless my care team says I can.     If I enroll in the [place] Medical Cannabis program, I will tell my care team before my next refill.    I will tell my care team right away if I become pregnant, have a new medical problem treated outside of my regular clinic, or have a change in my medicines.     I understand that this medicine can affect my thinking, judgment and reaction time.? Alcohol and drugs affect the brain and body, which can affect the safety of my driving. Being under the influence of alcohol or drugs can affect my decision-making, behaviors, personal safety and the safety of others. Driving while impaired (DWI) can occur if a person is driving, operating or in physical control of a car, motorcycle, boat, snowmobile, ATV, motorbike, off-road vehicle or any other motor vehicle ([place] Statute 169A.20). I understand the risk if I choose to drive or operate any vehicle or machinery.    I understand that if I do not follow any of the conditions above, my prescriptions or treatment may be stopped or changed.   I agree that my provider, clinic care team and pharmacy may work with any city, state or federal law enforcement agency that investigates the misuse, sale or other diversion of my controlled medicine. I will allow my provider to discuss my care with, or share a copy of, this agreement with any other treating provider, pharmacy or emergency room where I receive care.     I have read this agreement and have asked questions about anything I did not understand.    ________________________________________________________  Patient Signature - Vidhi Tovar     ___________________                   Date     ________________________________________________________  Provider Signature - Leydi Elizabeth MD       ___________________                   Date      ________________________________________________________  Witness Signature (required if provider not present while patient signing)          ___________________                   Date

## 2025-04-02 DIAGNOSIS — F90.0 ADHD (ATTENTION DEFICIT HYPERACTIVITY DISORDER), INATTENTIVE TYPE: ICD-10-CM

## 2025-04-02 RX ORDER — DEXTROAMPHETAMINE SACCHARATE, AMPHETAMINE ASPARTATE, DEXTROAMPHETAMINE SULFATE AND AMPHETAMINE SULFATE 5; 5; 5; 5 MG/1; MG/1; MG/1; MG/1
TABLET ORAL
Qty: 30 TABLET | Refills: 0 | Status: SHIPPED | OUTPATIENT
Start: 2025-04-02

## 2025-04-02 RX ORDER — DEXTROAMPHETAMINE SACCHARATE, AMPHETAMINE ASPARTATE, DEXTROAMPHETAMINE SULFATE AND AMPHETAMINE SULFATE 5; 5; 5; 5 MG/1; MG/1; MG/1; MG/1
TABLET ORAL
Qty: 30 TABLET | Refills: 0 | Status: CANCELLED | OUTPATIENT
Start: 2025-04-02

## 2025-04-02 NOTE — TELEPHONE ENCOUNTER
Medication requested: amphetamine-dextroamphetamine (ADDERALL) 20 MG tablet   Last office visit: 3/4/2025  [facility] appointments: 7/17/2025  Medication last refilled: 3/4/2025; 30 tabs + 0 refills; last sold #30 on per 3/4/2025   Last qualifying labs: n/a    Routing refill request to provider for review/approval because:  Drug not on the [facility] refill protocol     THOMPSON Erazo, RN  04/02/25, 1:51 PM

## 2025-04-25 ENCOUNTER — MYC MEDICAL ADVICE (OUTPATIENT)
Dept: FAMILY MEDICINE | Facility: CLINIC | Age: 45
End: 2025-04-25

## 2025-04-25 DIAGNOSIS — B00.9 HSV (HERPES SIMPLEX VIRUS) INFECTION: Primary | ICD-10-CM

## 2025-04-25 DIAGNOSIS — F90.0 ADHD (ATTENTION DEFICIT HYPERACTIVITY DISORDER), INATTENTIVE TYPE: ICD-10-CM

## 2025-04-25 NOTE — TELEPHONE ENCOUNTER
Amphetamine-dextroamphetamine (Adderall) 20 mg     Last Office Visit: 3/4/25  [facility] Appointments: 7/17/25  Medication last refilled:     Medication last refilled:   4/2/25 #30 with 0 refill(s) - Adderall     verified - last fill date:   4/4/25 #15 - Adderall     CSA on File - Yes, 3/4/25  Last U-Tox - 3/4/25    Routing refill request to provider for review/approval because:  Drug not on the [facility] refill protocol     Yolanda Hebert, FAINAN, RN, CCM

## 2025-04-28 ENCOUNTER — OFFICE VISIT (OUTPATIENT)
Dept: FAMILY MEDICINE | Facility: CLINIC | Age: 45
End: 2025-04-28
Payer: COMMERCIAL

## 2025-04-28 VITALS
BODY MASS INDEX: 27.48 KG/M2 | DIASTOLIC BLOOD PRESSURE: 75 MMHG | WEIGHT: 175.08 LBS | HEART RATE: 87 BPM | HEIGHT: 67 IN | TEMPERATURE: 97 F | OXYGEN SATURATION: 95 % | SYSTOLIC BLOOD PRESSURE: 128 MMHG

## 2025-04-28 DIAGNOSIS — J02.9 SORE THROAT: Primary | ICD-10-CM

## 2025-04-28 LAB
DEPRECATED S PYO AG THROAT QL EIA: NEGATIVE
FLUAV RNA SPEC QL NAA+PROBE: NEGATIVE
FLUBV RNA RESP QL NAA+PROBE: NEGATIVE
RSV RNA SPEC NAA+PROBE: NEGATIVE
S PYO DNA THROAT QL NAA+PROBE: NOT DETECTED
SARS-COV-2 RNA RESP QL NAA+PROBE: NEGATIVE

## 2025-04-28 NOTE — PROGRESS NOTES
"  Assessment & Plan   Problem List Items Addressed This Visit    None  Visit Diagnoses       Sore throat    -  Primary    Relevant Medications    amoxicillin-clavulanate (AUGMENTIN) 875-125 MG tablet    Other Relevant Orders    Streptococcus A Rapid Screen w/Reflex to PCR - Clinic Collect (Completed)    Influenza A/B, RSV and SARS-CoV2 PCR (COVID-19)    Group A Streptococcus PCR Throat Swab           Testing as noted above and insurance ABX Rx should strep test return positive or symptoms worsen acutely over the next few days. Will notify Mindy of test results as available.     24 minutes spent on the date of the encounter doing chart review, history and exam, documentation and further activities as noted.      Subjective   Mindy is a 44 year old, presenting for the following health issues:  Throat Problem (Sore throat, body aches. Started on Thursday and was in full effect by Sunday, covid negative.)    HPI    Sore throat  - started with ear tenderness 3 days ago  - then two days ago developed sore throat, body aches, chills, head congestion  - symptoms continue into today although they appear to be improving slowly      Review of Systems  Constitutional, HEENT, cardiovascular, pulmonary, gi and gu systems are negative, except as otherwise noted.      Objective    /75   Pulse 87   Temp 97  F (36.1  C)   Ht 1.694 m (5' 6.69\")   Wt 79.4 kg (175 lb 1.3 oz)   SpO2 95%   BMI 27.67 kg/m    Body mass index is 27.67 kg/m .    Physical Exam   GENERAL: alert and no distress  HENT: erythematous tonsils with visible exudate; ear canals and TM's normal  NECK: bilateral lymphadenopathy, no asymmetry, masses, or scars  RESP: lungs clear to auscultation - no rales, rhonchi or wheezes  CV: regular rate and rhythm, normal S1 S2, no S3 or S4, no murmur, click or rub, no peripheral edema  ABDOMEN: soft, nontender, no hepatosplenomegaly, no masses and bowel sounds normal  MS: no gross musculoskeletal defects noted, no " edema          Signed Electronically by: Isak Sandoval MD

## 2025-04-30 RX ORDER — VALACYCLOVIR HYDROCHLORIDE 1 G/1
1000 TABLET, FILM COATED ORAL 2 TIMES DAILY
Qty: 20 TABLET | Refills: 2 | Status: SHIPPED | OUTPATIENT
Start: 2025-04-30

## 2025-04-30 NOTE — TELEPHONE ENCOUNTER
Valacyclovir (Valtrex) 1000 mg    Last Office Visit: 4/28/25  [facility] Appointments: None  Medication last refilled: 10/30/24 #20 with 0 refill(s)    Required labs per protocol:    LAB REF RANGE 6/21/22 7/16/24   GFR >60 mL/min/1.73m2  >90 76   Creatinine 0.67-1.17 mg/dL 0.81 0.94     Prescription approved per [facility] Refill Protocol.    Yolanda Hebert, FAINAN, RN, CCM

## 2025-05-01 RX ORDER — DEXTROAMPHETAMINE SACCHARATE, AMPHETAMINE ASPARTATE, DEXTROAMPHETAMINE SULFATE AND AMPHETAMINE SULFATE 5; 5; 5; 5 MG/1; MG/1; MG/1; MG/1
TABLET ORAL
Qty: 30 TABLET | Refills: 0 | Status: SHIPPED | OUTPATIENT
Start: 2025-05-01

## 2025-06-04 DIAGNOSIS — F90.0 ADHD (ATTENTION DEFICIT HYPERACTIVITY DISORDER), INATTENTIVE TYPE: ICD-10-CM

## 2025-06-04 DIAGNOSIS — F41.9 ANXIETY: ICD-10-CM

## 2025-06-04 RX ORDER — ALPRAZOLAM 0.25 MG
0.25 TABLET ORAL 3 TIMES DAILY PRN
Qty: 45 TABLET | Refills: 0 | Status: SHIPPED | OUTPATIENT
Start: 2025-06-04

## 2025-06-04 RX ORDER — DEXTROAMPHETAMINE SACCHARATE, AMPHETAMINE ASPARTATE, DEXTROAMPHETAMINE SULFATE AND AMPHETAMINE SULFATE 5; 5; 5; 5 MG/1; MG/1; MG/1; MG/1
TABLET ORAL
Qty: 30 TABLET | Refills: 0 | Status: SHIPPED | OUTPATIENT
Start: 2025-06-04

## 2025-06-04 NOTE — TELEPHONE ENCOUNTER
Alprazolam (Xanax) 0.25 mg + Amphetamine-dextroamphetamine (Adderall) 20 mg    Last Office Visit: 4/28/25  Future [facility] Appointments: 7/17/25  Medication last refilled:     Medication last refilled:   4/25/25 #45 with 0 refill(s) - Alprazolam  5/1/25 #30 with 0 refill(s) - Adderall     verified - last fill date:   4/29/25 #45 - Alprazolam  5/12/25 #15 - Adderall    CSA on File - Yes, 3/4/25  U-Tox - 3/4/25    PHQ-9 / DIONNE-7 Scores 12/14/23 7/3/24 3/4/25   DIONNE-7 Score DocFlow 5 4 7     Routing refill request to provider for review/approval because:  Drug not on the [facility] refill protocol     FAINA PatrickN, RN, CCM

## 2025-06-17 ENCOUNTER — OFFICE VISIT (OUTPATIENT)
Dept: FAMILY MEDICINE | Facility: CLINIC | Age: 45
End: 2025-06-17
Payer: COMMERCIAL

## 2025-06-17 VITALS
WEIGHT: 178 LBS | TEMPERATURE: 97.7 F | DIASTOLIC BLOOD PRESSURE: 72 MMHG | HEART RATE: 76 BPM | BODY MASS INDEX: 28.14 KG/M2 | RESPIRATION RATE: 17 BRPM | OXYGEN SATURATION: 97 % | SYSTOLIC BLOOD PRESSURE: 127 MMHG

## 2025-06-17 DIAGNOSIS — J40 BRONCHITIS: Primary | ICD-10-CM

## 2025-06-17 RX ORDER — GUAIFENESIN 600 MG/1
1200 TABLET, EXTENDED RELEASE ORAL 2 TIMES DAILY
Status: SHIPPED
Start: 2025-06-17

## 2025-06-17 RX ORDER — INHALER, ASSIST DEVICES
SPACER (EA) MISCELLANEOUS
Status: SHIPPED
Start: 2025-06-17

## 2025-06-17 RX ORDER — ALBUTEROL SULFATE 90 UG/1
2 INHALANT RESPIRATORY (INHALATION) EVERY 4 HOURS PRN
Qty: 18 G | Refills: 1 | Status: SHIPPED | OUTPATIENT
Start: 2025-06-17

## 2025-06-17 ASSESSMENT — PAIN SCALES - GENERAL: PAINLEVEL_OUTOF10: MILD PAIN (3)

## 2025-06-17 NOTE — PROGRESS NOTES
[facility]  [address], [address]  [place] [address]  Phone: [phone]  Fax: [phone]    Patient:  Vidhi Tovar 1980  Date of Visit:  11:03 AM  Referring Provider Referred Self      Assessment & Plan    (J40) Bronchitis  (primary encounter diagnosis)  Comment: suspect viral illness, spastic cough, rhinorrhea, fatigue, no sore throat, no current fever, no red flags, no shortness of breath  Plan: albuterol (PROAIR HFA/PROVENTIL HFA/VENTOLIN         HFA) 108 (90 Base) MCG/ACT inhaler, guaiFENesin        (MUCINEX) 600 MG 12 hr tablet, spacer         (OPTICHAMBER PERLA) holding chamber        Discussed rest, fluids, activity as tolerated. Symptomatic cares, analgesics, mucinex. Rx for albuterol with instruction on use with spacer. Spacer given at clinic visit today. Notify MD for any acute changes, worsening of symptoms.     Simran Lee MD       Vidhi Tovar is a 45 year old female with hx of ADHD, depression, anxiety. She is here for the following issues:    URI symptoms  6 day history of URI symptoms.   started with cough, (dry, hacking, now productive --episodic- yellow as of yesterday)   Headache today. No ST, fullness left ear +pulsatile tinnitus -->>resolved.   + major fatigue  Near vomting with coughing, some gassy stomach,  no diarrhea  Appetite good, drinking,   No hx of asthma, chest feeling tight, tickling with deep breath  Nyquil helps at night  Self employed  Tested negative for COVID x 2  Laryngitis started 3 d ago      Patient Active Problem List   Diagnosis    ADHD (attention deficit hyperactivity disorder), inattentive type    Moderate episode of recurrent major depressive disorder (H)    DIONNE (generalized anxiety disorder)       Current Outpatient Medications   Medication Sig Dispense Refill           ALPRAZolam (XANAX) 0.25 MG tablet Take 1 tablet (0.25 mg) by mouth 3 times daily as needed for anxiety. 45 tablet  0    amphetamine-dextroamphetamine (ADDERALL) 20 MG tablet Take 1/4-1/2 tablet (5-10 mg) daily as needed for attention/focus 30 tablet 0    buPROPion (WELLBUTRIN XL) 150 MG 24 hr tablet Take 1 tablet (150 mg) by mouth every 48 hours. Every other day 45 tablet 3    buPROPion (WELLBUTRIN XL) 300 MG 24 hr tablet Take 1 tablet (300 mg) by mouth every 48 hours. Every other day 45 tablet 3    valACYclovir (VALTREX) 1000 mg tablet Take 1 tablet (1,000 mg) by mouth 2 times daily. 20 tablet 2    Vitamin D, Cholecalciferol, 25 MCG (1000 UT) CAPS Take by mouth.         No Known Allergies     EXAM  /72 (BP Location: Left arm, Patient Position: Sitting, Cuff Size: Adult Regular)   Pulse 76   Temp 97.7  F (36.5  C) (Skin)   Resp 17   Wt 80.7 kg (178 lb)   LMP 06/09/2025 (Exact Date)   SpO2 97%   BMI 28.14 kg/m    Gen: Alert, somewhat fatigued, speaking full sentences, occasional hacking cough  HEENT:  Conjunctiva nl, TM normal bilaterally, OP clear, no posterior erythema, + edematous turbinates, clear rhinorrhea  COR: S1,S2, no murmur  Lungs: CTA bilaterally, no rhonchi, wheezes or rales

## 2025-06-17 NOTE — NURSING NOTE
45 year old    Chief Complaint   Patient presents with    Cough     Has been 6 days, has tested for COVID twice and it was negative   Lost voice on Saturday         Blood pressure 127/72, pulse 76, temperature 97.7  F (36.5  C), temperature source Skin, resp. rate 17, weight 80.7 kg (178 lb), last menstrual period 06/09/2025, SpO2 97%. Body mass index is 28.14 kg/m .    Patient Active Problem List   Diagnosis    ADHD (attention deficit hyperactivity disorder), inattentive type    Moderate episode of recurrent major depressive disorder (H)    DIONNE (generalized anxiety disorder)          Wt Readings from Last 2 Encounters:   06/17/25 80.7 kg (178 lb)   04/28/25 79.4 kg (175 lb 1.3 oz)       BP Readings from Last 3 Encounters:   06/17/25 127/72   04/28/25 128/75   03/04/25 106/72             Current Outpatient Medications   Medication Sig Dispense Refill    ALPRAZolam (XANAX) 0.25 MG tablet Take 1 tablet (0.25 mg) by mouth 3 times daily as needed for anxiety. 45 tablet 0    amphetamine-dextroamphetamine (ADDERALL) 20 MG tablet Take 1/4-1/2 tablet (5-10 mg) daily as needed for attention/focus 30 tablet 0    buPROPion (WELLBUTRIN XL) 150 MG 24 hr tablet Take 1 tablet (150 mg) by mouth every 48 hours. Every other day 45 tablet 3    buPROPion (WELLBUTRIN XL) 300 MG 24 hr tablet Take 1 tablet (300 mg) by mouth every 48 hours. Every other day 45 tablet 3    valACYclovir (VALTREX) 1000 mg tablet Take 1 tablet (1,000 mg) by mouth 2 times daily. 20 tablet 2    Vitamin D, Cholecalciferol, 25 MCG (1000 UT) CAPS Take by mouth.      amoxicillin-clavulanate (AUGMENTIN) 875-125 MG tablet Take 1 tablet by mouth 2 times daily. (Patient not taking: Reported on 6/17/2025) 14 tablet 0    Probiotic Product (UP4 PROBIOTICS PO)        No current facility-administered medications for this visit.          Social History     Tobacco Use    Smoking status: Never     Passive exposure: Never    Smokeless tobacco: Never   Vaping Use    Vaping  "status: Never Used   Substance Use Topics    Alcohol use: Not Currently     Comment: former    Drug use: Yes     Types: Marijuana, Psilocybin     Comment: occasional use          Health Maintenance Due   Topic Date Due    ADVANCE CARE PLANNING  Never done    DEPRESSION ACTION PLAN  Never done    HEPATITIS B VACCINE (2 of 3 - Hep B Twinrix 3-dose series) 11/15/2014    MAMMO SCREENING  06/13/2025    YEARLY PREVENTIVE VISIT  07/15/2025        No results found for: \"PAP\"        June 17, 2025 11:00 AM        "

## 2025-06-18 SDOH — HEALTH STABILITY: PHYSICAL HEALTH: ON AVERAGE, HOW MANY MINUTES DO YOU ENGAGE IN EXERCISE AT THIS LEVEL?: 30 MIN

## 2025-06-18 SDOH — HEALTH STABILITY: PHYSICAL HEALTH: ON AVERAGE, HOW MANY DAYS PER WEEK DO YOU ENGAGE IN MODERATE TO STRENUOUS EXERCISE (LIKE A BRISK WALK)?: 3 DAYS

## 2025-06-18 ASSESSMENT — PATIENT HEALTH QUESTIONNAIRE - PHQ9
SUM OF ALL RESPONSES TO PHQ QUESTIONS 1-9: 5
SUM OF ALL RESPONSES TO PHQ QUESTIONS 1-9: 5
10. IF YOU CHECKED OFF ANY PROBLEMS, HOW DIFFICULT HAVE THESE PROBLEMS MADE IT FOR YOU TO DO YOUR WORK, TAKE CARE OF THINGS AT HOME, OR GET ALONG WITH OTHER PEOPLE: NOT DIFFICULT AT ALL

## 2025-06-18 ASSESSMENT — ANXIETY QUESTIONNAIRES
4. TROUBLE RELAXING: NOT AT ALL
7. FEELING AFRAID AS IF SOMETHING AWFUL MIGHT HAPPEN: SEVERAL DAYS
IF YOU CHECKED OFF ANY PROBLEMS ON THIS QUESTIONNAIRE, HOW DIFFICULT HAVE THESE PROBLEMS MADE IT FOR YOU TO DO YOUR WORK, TAKE CARE OF THINGS AT HOME, OR GET ALONG WITH OTHER PEOPLE: NOT DIFFICULT AT ALL
GAD7 TOTAL SCORE: 5
2. NOT BEING ABLE TO STOP OR CONTROL WORRYING: NOT AT ALL
5. BEING SO RESTLESS THAT IT IS HARD TO SIT STILL: SEVERAL DAYS
3. WORRYING TOO MUCH ABOUT DIFFERENT THINGS: SEVERAL DAYS
7. FEELING AFRAID AS IF SOMETHING AWFUL MIGHT HAPPEN: SEVERAL DAYS
GAD7 TOTAL SCORE: 5
1. FEELING NERVOUS, ANXIOUS, OR ON EDGE: SEVERAL DAYS
GAD7 TOTAL SCORE: 5
8. IF YOU CHECKED OFF ANY PROBLEMS, HOW DIFFICULT HAVE THESE MADE IT FOR YOU TO DO YOUR WORK, TAKE CARE OF THINGS AT HOME, OR GET ALONG WITH OTHER PEOPLE?: NOT DIFFICULT AT ALL
6. BECOMING EASILY ANNOYED OR IRRITABLE: SEVERAL DAYS

## 2025-06-18 ASSESSMENT — SOCIAL DETERMINANTS OF HEALTH (SDOH): HOW OFTEN DO YOU GET TOGETHER WITH FRIENDS OR RELATIVES?: THREE TIMES A WEEK

## 2025-06-23 ENCOUNTER — OFFICE VISIT (OUTPATIENT)
Dept: FAMILY MEDICINE | Facility: CLINIC | Age: 45
End: 2025-06-23
Payer: COMMERCIAL

## 2025-06-23 VITALS
RESPIRATION RATE: 16 BRPM | HEIGHT: 68 IN | BODY MASS INDEX: 26.67 KG/M2 | DIASTOLIC BLOOD PRESSURE: 75 MMHG | SYSTOLIC BLOOD PRESSURE: 124 MMHG | OXYGEN SATURATION: 98 % | WEIGHT: 176 LBS | HEART RATE: 74 BPM | TEMPERATURE: 98 F

## 2025-06-23 DIAGNOSIS — Z11.4 SCREENING FOR HIV (HUMAN IMMUNODEFICIENCY VIRUS): ICD-10-CM

## 2025-06-23 DIAGNOSIS — Z11.3 SCREENING FOR STDS (SEXUALLY TRANSMITTED DISEASES): ICD-10-CM

## 2025-06-23 DIAGNOSIS — Z00.00 ROUTINE GENERAL MEDICAL EXAMINATION AT A HEALTH CARE FACILITY: ICD-10-CM

## 2025-06-23 DIAGNOSIS — F41.9 ANXIETY: ICD-10-CM

## 2025-06-23 DIAGNOSIS — Z13.228 SCREENING FOR METABOLIC DISORDER: ICD-10-CM

## 2025-06-23 DIAGNOSIS — F90.0 ADHD (ATTENTION DEFICIT HYPERACTIVITY DISORDER), INATTENTIVE TYPE: ICD-10-CM

## 2025-06-23 DIAGNOSIS — Z00.00 ANNUAL PHYSICAL EXAM: Primary | ICD-10-CM

## 2025-06-23 DIAGNOSIS — Z13.220 LIPID SCREENING: ICD-10-CM

## 2025-06-23 DIAGNOSIS — Z11.59 NEED FOR HEPATITIS C SCREENING TEST: ICD-10-CM

## 2025-06-23 LAB
ANION GAP SERPL CALCULATED.3IONS-SCNC: 10 MMOL/L (ref 7–15)
BUN SERPL-MCNC: 15.4 MG/DL (ref 6–20)
C TRACH DNA SPEC QL PROBE+SIG AMP: NEGATIVE
CALCIUM SERPL-MCNC: 9.6 MG/DL (ref 8.8–10.4)
CHLORIDE SERPL-SCNC: 104 MMOL/L (ref 98–107)
CHOLEST SERPL-MCNC: 203 MG/DL
CREAT SERPL-MCNC: 0.94 MG/DL (ref 0.51–0.95)
EGFRCR SERPLBLD CKD-EPI 2021: 76 ML/MIN/1.73M2
FASTING STATUS PATIENT QL REPORTED: YES
FASTING STATUS PATIENT QL REPORTED: YES
GLUCOSE SERPL-MCNC: 88 MG/DL (ref 70–99)
HCO3 SERPL-SCNC: 23 MMOL/L (ref 22–29)
HCV AB SERPL QL IA: NONREACTIVE
HDLC SERPL-MCNC: 52 MG/DL
HIV 1+2 AB+HIV1 P24 AG SERPL QL IA: NONREACTIVE
LDLC SERPL CALC-MCNC: 115 MG/DL
N GONORRHOEA DNA SPEC QL NAA+PROBE: NEGATIVE
NONHDLC SERPL-MCNC: 151 MG/DL
POTASSIUM SERPL-SCNC: 4.4 MMOL/L (ref 3.4–5.3)
SODIUM SERPL-SCNC: 137 MMOL/L (ref 135–145)
SPECIMEN TYPE: NORMAL
T PALLIDUM AB SER QL: NONREACTIVE
TRIGL SERPL-MCNC: 182 MG/DL

## 2025-06-23 RX ORDER — DEXTROAMPHETAMINE SACCHARATE, AMPHETAMINE ASPARTATE, DEXTROAMPHETAMINE SULFATE AND AMPHETAMINE SULFATE 5; 5; 5; 5 MG/1; MG/1; MG/1; MG/1
TABLET ORAL
Qty: 30 TABLET | Refills: 0 | Status: SHIPPED | OUTPATIENT
Start: 2025-06-23

## 2025-06-23 RX ORDER — ALPRAZOLAM 0.25 MG
0.25 TABLET ORAL 3 TIMES DAILY PRN
Qty: 45 TABLET | Refills: 0 | Status: SHIPPED | OUTPATIENT
Start: 2025-06-23

## 2025-06-23 NOTE — PATIENT INSTRUCTIONS
Patient Education   Preventive Care Advice   This is general advice given by our system to help you stay healthy. However, your care team may have specific advice just for you. Please talk to your care team about your preventive care needs.  Nutrition  Eat 5 or more servings of fruits and vegetables each day.  Try wheat bread, brown rice and whole grain pasta (instead of white bread, rice, and pasta).  Get enough calcium and vitamin D. Check the label on foods and aim for 100% of the RDA (recommended daily allowance).  Lifestyle  Exercise at least 150 minutes each week  (30 minutes a day, 5 days a week).  Do muscle strengthening activities 2 days a week. These help control your weight and prevent disease.  No smoking.  Wear sunscreen to prevent skin cancer.  Have a dental exam and cleaning every 6 months.  Yearly exams  See your health care team every year to talk about:  Any changes in your health.  Any medicines your care team has prescribed.  Preventive care, family planning, and ways to prevent chronic diseases.  Shots (vaccines)   HPV shots (up to age 26), if you've never had them before.  Hepatitis B shots (up to age 59), if you've never had them before.  COVID-19 shot: Get this shot when it's due.  Flu shot: Get a flu shot every year.  Tetanus shot: Get a tetanus shot every 10 years.  Pneumococcal, hepatitis A, and RSV shots: Ask your care team if you need these based on your risk.  Shingles shot (for age 50 and up)  General health tests  Diabetes screening:  Starting at age 35, Get screened for diabetes at least every 3 years.  If you are younger than age 35, ask your care team if you should be screened for diabetes.  Cholesterol test: At age 39, start having a cholesterol test every 5 years, or more often if advised.  Bone density scan (DEXA): At age 50, ask your care team if you should have this scan for osteoporosis (brittle bones).  Hepatitis C: Get tested at least once in your life.  STIs (sexually  transmitted infections)  Before age 24: Ask your care team if you should be screened for STIs.  After age 24: Get screened for STIs if you're at risk. You are at risk for STIs (including HIV) if:  You are sexually active with more than one person.  You don't use condoms every time.  You or a partner was diagnosed with a sexually transmitted infection.  If you are at risk for HIV, ask about PrEP medicine to prevent HIV.  Get tested for HIV at least once in your life, whether you are at risk for HIV or not.  Cancer screening tests  Cervical cancer screening: If you have a cervix, begin getting regular cervical cancer screening tests starting at age 21.  Breast cancer scan (mammogram): If you've ever had breasts, begin having regular mammograms starting at age 40. This is a scan to check for breast cancer.  Colon cancer screening: It is important to start screening for colon cancer at age 45.  Have a colonoscopy test every 10 years (or more often if you're at risk) Or, ask your provider about stool tests like a FIT test every year or Cologuard test every 3 years.  To learn more about your testing options, visit:   .  For help making a decision, visit:   https://bit.ly/ho33806.  Prostate cancer screening test: If you have a prostate, ask your care team if a prostate cancer screening test (PSA) at age 55 is right for you.  Lung cancer screening: If you are a current or former smoker ages 50 to 80, ask your care team if ongoing lung cancer screenings are right for you.  For informational purposes only. Not to replace the advice of your health care provider. Copyright   2023 [facility]. All rights reserved. Clinically reviewed by the [facility]. [employer] [id number] - REV 01/24.  Learning About Stress  What is stress?     Stress is your body's response to a hard situation. Your body can have a physical, emotional, or mental response. Stress is a fact of life for most people, and it  affects everyone differently. What causes stress for you may not be stressful for someone else.  A lot of things can cause stress. You may feel stress when you go on a job interview, take a test, or run a race. This kind of short-term stress is normal and even useful. It can help you if you need to work hard or react quickly. For example, stress can help you finish an important job on time.  Long-term stress is caused by ongoing stressful situations or events. Examples of long-term stress include long-term health problems, ongoing problems at work, or conflicts in your family. Long-term stress can harm your health.  How does stress affect your health?  When you are stressed, your body responds as though you are in danger. It makes hormones that speed up your heart, make you breathe faster, and give you a burst of energy. This is called the fight-or-flight stress response. If the stress is over quickly, your body goes back to normal and no harm is done.  But if stress happens too often or lasts too long, it can have bad effects. Long-term stress can make you more likely to get sick, and it can make symptoms of some diseases worse. If you tense up when you are stressed, you may develop neck, shoulder, or low back pain. Stress is linked to high blood pressure and heart disease.  Stress also harms your emotional health. It can make you mcleod, tense, or depressed. Your relationships may suffer, and you may not do well at work or school.  What can you do to manage stress?  You can try these things to help manage stress:   Do something active. Exercise or activity can help reduce stress. Walking is a great way to get started. Even everyday activities such as housecleaning or yard work can help.  Try yoga or joe chi. These techniques combine exercise and meditation. You may need some training at first to learn them.  Do something you enjoy. For example, listen to music or go to a movie. Practice your hobby or do volunteer  "work.  Meditate. This can help you relax, because you are not worrying about what happened before or what may happen in the future.  Do guided imagery. Imagine yourself in any setting that helps you feel calm. You can use online videos, books, or a teacher to guide you.  Do breathing exercises. For example:  From a standing position, bend forward from the waist with your knees slightly bent. Let your arms dangle close to the floor.  Breathe in slowly and deeply as you return to a standing position. Roll up slowly and lift your head last.  Hold your breath for just a few seconds in the standing position.  Breathe out slowly and bend forward from the waist.  Let your feelings out. Talk, laugh, cry, and express anger when you need to. Talking with supportive friends or family, a counselor, or a marisol leader about your feelings is a healthy way to relieve stress. Avoid discussing your feelings with people who make you feel worse.  Write. It may help to write about things that are bothering you. This helps you find out how much stress you feel and what is causing it. When you know this, you can find better ways to cope.  What can you do to prevent stress?  You might try some of these things to help prevent stress:  Manage your time. This helps you find time to do the things you want and need to do.  Get enough sleep. Your body recovers from the stresses of the day while you are sleeping.  Get support. Your family, friends, and community can make a difference in how you experience stress.  Limit your news feed. Avoid or limit time on social media or news that may make you feel stressed.  Do something active. Exercise or activity can help reduce stress. Walking is a great way to get started.  Where can you learn more?  Go to https://www.[employer].net/patiented  Enter N032 in the search box to learn more about \"Learning About Stress.\"  Current as of: October 24, 2024  Content Version: 14.5 2024-2025 [employer].   Care instructions adapted under license by your healthcare professional. If you have questions about a medical condition or this instruction, always ask your healthcare professional. [employer] disclaims any warranty or liability for your use of this information.    Recovering From Depression: Care Instructions  Overview    Sticking to your treatment plan is important as you recover from depression. It may take time for your symptoms to get better after you start treatment. Try not to give up if you don't feel better right away. Make sure you keep going to counseling and taking any prescribed medicine if they are part of your treatment plan.  Focus on things that can help you feel better, such as being with friends and family. Try to eat healthy foods, be active, and get enough sleep. Take things slowly as you begin to recover.  Follow-up care is a key part of your treatment and safety. Be sure to make and go to all appointments, and call your doctor if you are having problems. It's also a good idea to know your test results and keep a list of the medicines you take.  How can you care for yourself at home?  Be realistic  If you have a large task to do, break it up into smaller steps you can handle, and just do what you can.  You may want to put off important decisions until your depression has lifted. If you have plans that will have a major impact on your life, such as marriage, divorce, or a job change, try to wait a bit. Talk it over with friends and loved ones who can help you look at the overall picture first.  Reaching out to people for help is important. Do not isolate yourself. Let your family and friends help you. Find someone you can trust and confide in, and talk to that person.  Be patient, and be kind to yourself. Remember that depression is not your fault and is not something you can overcome with willpower alone. Treatment is important for depression, just like for any other  illness. Feeling better takes time, and your mood will improve little by little.  Stay active  Stay busy and get outside. Take a walk, or try some other light exercise.  Talk with your doctor about an exercise program. Exercise can help with mild depression.  Go to a movie or concert. Take part in a Pent[employer]al activity or other social gathering. Go to a ball game.  Ask a friend to have dinner with you.  Take care of yourself  Eat healthy foods such as fresh fruits and vegetables, whole grains, and lean protein. If you have lost your appetite, eat small snacks rather than large meals.  Avoid using marijuana and other drugs and drinking alcohol. Do not take medicines that have not been prescribed for you. They may interfere with medicines you may be taking for depression, or they may make your depression worse.  Take your medicines exactly as they are prescribed. You may start to feel better within 1 to 3 weeks of taking antidepressant medicine. But it can take as many as 6 to 8 weeks to see more improvement. If you have questions or concerns about your medicines, or if you do not notice any improvement by 3 weeks, talk to your doctor.  Continue to take your medicine after your symptoms improve. Taking your medicine for at least 6 months after you feel better can help keep you from getting depressed again. If this isn't the first time you have been depressed, your doctor may recommend you to take medicine even longer.  If you have any side effects from your medicine, tell your doctor. Many side effects are mild and will go away on their own after you have been taking the medicine for a few weeks. Some may last longer. Talk to your doctor if side effects are bothering you too much. You might be able to try a different medicine.  Continue counseling. It may help prevent depression from returning, especially if you've had multiple episodes of depression. Talk with your counselor if you are having a hard time attending your  sessions or you think the sessions aren't working. Don't just stop going.  Get enough sleep. Talk to your doctor if you are having problems sleeping.  Avoid sleeping pills unless they are prescribed by the doctor treating your depression. Sleeping pills may make you groggy during the day, and they may interact with other medicine you are taking.  If you have any other illnesses, such as diabetes, heart disease, or high blood pressure, make sure to continue with your treatment. Tell your doctor about all of the medicines you take, including those with or without a prescription.  Where to get help 24 hours a day, 7 days a week  If you or someone you know talks about suicide, self-harm, a mental health crisis, a substance use crisis, or any other kind of emotional distress, get help right away. You can:  Call the Suicide and Crisis Lifeline at [employer].  Text HOME to 830794 to access the Crisis Text Line.  Consider saving these numbers in your phone.  Go to [employer] for more information or to chat online.  Call 531 anytime you think you may need emergency care. For example, call if:  You feel like hurting yourself or someone else.  Someone you know has depression and is about to attempt or is attempting suicide.  Where to get help 24 hours a day, 7 days a week  If you or someone you know talks about suicide, self-harm, a mental health crisis, a substance use crisis, or any other kind of emotional distress, get help right away. You can:  Call the Suicide and Crisis Lifeline at 034.  Text HOME to 959717 to access the Crisis Text Line.  Consider saving these numbers in your phone.  Go to [employer] for more information or to chat online.  Call your doctor now or seek immediate medical care if:  You hear voices.  Someone you know has depression and:  Starts to give away possessions.  Uses illegal drugs or drinks alcohol heavily.  Talks or writes about death, including writing suicide notes or talking about guns,  "knives, or pills.  Starts to spend a lot of time alone.  Acts very aggressively or suddenly appears calm.  Watch closely for changes in your health, and be sure to contact your doctor if:  You do not get better as expected.  Where can you learn more?  Go to https://www.[employer].net/patiented  Enter N529 in the search box to learn more about \"Recovering From Depression: Care Instructions.\"  Current as of: July 31, 2024  Content Version: 14.5    6926-7063 [employer].   Care instructions adapted under license by your healthcare professional. If you have questions about a medical condition or this instruction, always ask your healthcare professional. [employer] disclaims any warranty or liability for your use of this information.    Substance Use Disorder: Care Instructions  Overview     You can improve your life and health by stopping your use of alcohol or drugs. When you don't drink or use drugs, you may feel and sleep better. You may get along better with your family, friends, and coworkers. There are medicines and programs that can help with substance use disorder.  How can you care for yourself at home?  Here are some ways to help you stay sober and prevent relapse.  If you have been given medicine to help keep you sober or reduce your cravings, be sure to take it exactly as prescribed.  Talk to your doctor about programs that can help you stop using drugs or drinking alcohol.  Do not keep alcohol or drugs in your home.  Plan ahead. Think about what you'll say if other people ask you to drink or use drugs. Try not to spend time with people who drink or use drugs.  Use the time and money spent on drinking or drugs to do something that's important to you.  Preventing a relapse  Have a plan to deal with relapse. Learn to recognize changes in your thinking that lead you to drink or use drugs. Get help before you start to drink or use drugs again.  Try to stay away from situations, friends, or " places that may lead you to drink or use drugs.  If you feel the need to drink alcohol or use drugs again, seek help right away. Call a trusted friend or family member. Some people get support from organizations such as Narcotics Anonymous or [employer] or from treatment facilities.  If you relapse, get help as soon as you can. Some people make a plan with another person that outlines what they want that person to do for them if they relapse. The plan usually includes how to handle the relapse and who to notify in case of relapse.  Don't give up. Remember that a relapse doesn't mean that you have failed. Use the experience to learn the triggers that lead you to drink or use drugs. Then quit again. Recovery is a lifelong process. Many people have several relapses before they are able to quit for good.  Follow-up care is a key part of your treatment and safety. Be sure to make and go to all appointments, and call your doctor if you are having problems. It's also a good idea to know your test results and keep a list of the medicines you take.  When should you call for help?   Call 111  anytime you think you may need emergency care. For example, call if you or someone else:    Has overdosed or has withdrawal signs. Be sure to tell the emergency workers that you are or someone else is using or trying to quit using drugs. Overdose or withdrawal signs may include:  Losing consciousness.  Seizure.  Seeing or hearing things that aren't there (hallucinations).     Is thinking or talking about suicide or harming others.   Where to get help 24 hours a day, 7 days a week   If you or someone you know talks about suicide, self-harm, a mental health crisis, a substance use crisis, or any other kind of emotional distress, get help right away. You can:    Call the Suicide and Crisis Lifeline at 407.     Call 0-077-395-TALK (1-571.333.6406).     Text HOME to 221783 to access the Crisis Text Line.   Consider saving these numbers in  "your phone.  Go to [employer] for more information or to chat online.  Call your doctor now or seek immediate medical care if:    You are having withdrawal symptoms. These may include nausea or vomiting, sweating, shakiness, and anxiety.   Watch closely for changes in your health, and be sure to contact your doctor if:    You have a relapse.     You need more help or support to stop.   Where can you learn more?  Go to https://www.[employer]/patiented  Enter H573 in the search box to learn more about \"Substance Use Disorder: Care Instructions.\"  Current as of: August 20, 2024  Content Version: 14.5    9110-0400 [employer].   Care instructions adapted under license by your healthcare professional. If you have questions about a medical condition or this instruction, always ask your healthcare professional. [employer] disclaims any warranty or liability for your use of this information.    Safer Sex: Care Instructions  Overview  Safer sex is a way to reduce your risk of getting a sexually transmitted infection (STI). It can also help prevent pregnancy.  Several products can help you practice safer sex and reduce your chance of STIs. One of the best is a condom. There are internal and external condoms. You can use a special rubber sheet (dental dam) for protection during oral sex. Disposable gloves can keep your hands from touching blood, semen, or other body fluids that can carry infections.  Remember that birth control methods such as diaphragms, IUDs, foams, and birth control pills do not stop you from getting STIs.  Follow-up care is a key part of your treatment and safety. Be sure to make and go to all appointments, and call your doctor if you are having problems. It's also a good idea to know your test results and keep a list of the medicines you take.  How can you care for yourself at home?  Think about getting vaccinated to help prevent hepatitis A, hepatitis B, and human " "papillomavirus (HPV). They can be spread through sex.  Use a condom every time you have sex. Use an external condom, which goes on the penis. Or use an internal condom, which goes into the vagina or anus.  Make sure you use the right size external condom. A condom that's too small can break easily. A condom that's too big can slip off during sex.  Use a new condom each time you have sex. Be careful not to poke a hole in the condom when you open the wrapper.  Don't use an internal condom and an external condom at the same time.  Never use petroleum jelly (such as Vaseline), grease, hand lotion, baby oil, or anything with oil in it. These products can make holes in the condom.  After intercourse, hold the edge of the condom as you remove it. This will help keep semen from spilling out of the condom.  Do not have sex with anyone who has symptoms of an STI, such as sores on the genitals or mouth.  Do not drink a lot of alcohol or use drugs before sex.  Limit your sex partners. Sex with one partner who has sex only with you can reduce your risk of getting an STI.  Don't share sex toys. But if you do share them, use a condom and clean the sex toys between each use.  Talk to any partners before you have sex. Talk about what you feel comfortable with and whether you have any boundaries with sex. And find out if your partner or partners may be at risk for any STI. Keep in mind that a person may be able to spread an STI even if they do not have symptoms. You and any partners may want to get tested for STIs.  Where can you learn more?  Go to https://www.[employer].net/patiented  Enter B608 in the search box to learn more about \"Safer Sex: Care Instructions.\"  Current as of: April 30, 2024  Content Version: 14.5 2024-2025 [employer].   Care instructions adapted under license by your healthcare professional. If you have questions about a medical condition or this instruction, always ask your healthcare " professional. [facility] disclaims any warranty or liability for your use of this information.

## 2025-06-23 NOTE — NURSING NOTE
"45 year old  Chief Complaint   Patient presents with    Physical     STI screening --        Blood pressure 124/75, pulse 74, temperature 98  F (36.7  C), temperature source Temporal, resp. rate 16, height 1.727 m (5' 8\"), weight 79.8 kg (176 lb), last menstrual period 06/09/2025, SpO2 98%. Body mass index is 26.76 kg/m .  Patient Active Problem List   Diagnosis    ADHD (attention deficit hyperactivity disorder), inattentive type    Moderate episode of recurrent major depressive disorder (H)    DIONNE (generalized anxiety disorder)       Wt Readings from Last 2 Encounters:   06/23/25 79.8 kg (176 lb)   06/17/25 80.7 kg (178 lb)     BP Readings from Last 3 Encounters:   06/23/25 124/75   06/17/25 127/72   04/28/25 128/75         Current Outpatient Medications   Medication Sig Dispense Refill    albuterol (PROAIR HFA/PROVENTIL HFA/VENTOLIN HFA) 108 (90 Base) MCG/ACT inhaler Inhale 2 puffs into the lungs every 4 hours as needed for shortness of breath or wheezing. 18 g 1    ALPRAZolam (XANAX) 0.25 MG tablet Take 1 tablet (0.25 mg) by mouth 3 times daily as needed for anxiety. 45 tablet 0    amphetamine-dextroamphetamine (ADDERALL) 20 MG tablet Take 1/4-1/2 tablet (5-10 mg) daily as needed for attention/focus 30 tablet 0    buPROPion (WELLBUTRIN XL) 150 MG 24 hr tablet Take 1 tablet (150 mg) by mouth every 48 hours. Every other day 45 tablet 3    buPROPion (WELLBUTRIN XL) 300 MG 24 hr tablet Take 1 tablet (300 mg) by mouth every 48 hours. Every other day 45 tablet 3    guaiFENesin (MUCINEX) 600 MG 12 hr tablet Take 2 tablets (1,200 mg) by mouth 2 times daily. (Patient taking differently: Take 1,200 mg by mouth as needed.)      spacer (OPTICHAMBER PERLA) holding chamber Use with MDI      valACYclovir (VALTREX) 1000 mg tablet Take 1 tablet (1,000 mg) by mouth 2 times daily. 20 tablet 2    Vitamin D, Cholecalciferol, 25 MCG (1000 UT) CAPS Take by mouth.       No current facility-administered medications for this visit. " "      Social History     Tobacco Use    Smoking status: Never     Passive exposure: Never    Smokeless tobacco: Never   Vaping Use    Vaping status: Never Used   Substance Use Topics    Alcohol use: Not Currently     Comment: former    Drug use: Yes     Types: Marijuana, Psilocybin     Comment: occasional use       Health Maintenance Due   Topic Date Due    ADVANCE CARE PLANNING  Never done    DEPRESSION ACTION PLAN  Never done    HEPATITIS B VACCINE (2 of 3 - Hep B Twinrix 3-dose series) 11/15/2014    MAMMO SCREENING  06/13/2025    YEARLY PREVENTIVE VISIT  07/15/2025       No results found for: \"PAP\"      June 23, 2025 7:56 AM    "

## 2025-06-23 NOTE — PROGRESS NOTES
"Preventive Care Visit  [facility]  Alfred Newsome MD, Family Medicine  Jun 23, 2025      ASSESSMENT/PLAN:    Annual Exam/Preventive Issues   - Labs: Lipids, BMP and STI screenings. Will do via self swab as no symptoms or discharge  - Immunizations: Give 2nd Twinrix, then up to date  - Cancer screenings:   Up to date on Mammograms and Pap smear  Had colonoscopy in 2024 (due to some rectal bleeding). It was normal. Repeat in 2034 unless new symptoms  - Other recommendations:   Discussed advanced directive  Encouraged adding \"high intensity exercise\" even if in short bouts of just a few minutes    -Specific concerns:     Mental health. Doing well on medications. In need of early refills as leaving in 3 days for a trip to [facility]  Refilled medications  In counseling   LEFT ear feeling clogged but with normal exam  Try Nasal saline irrigation and other strategies that may repressurize the Eustachian tube.       -Follow up: as needed    Alfred Newsome MD  Family Medicine and Sports Medicine      INTRODUCTION:      this is my first time meeting Mindy.  She normally sees Dr. Elizabeth. She is here for an annual preventive exam.    Scheduled the visit with me today as she is in need of a refill of her Xanax and also Adderall  as she is leaving in 3 days for a trip to the [facility].    Recently had bronchitis but is much better. Coughing has resolved.       Current Outpatient Medications   Medication Sig Dispense Refill    ALPRAZolam (XANAX) 0.25 MG tablet Take 1 tablet (0.25 mg) by mouth 3 times daily as needed for anxiety. 45 tablet 0    amphetamine-dextroamphetamine (ADDERALL) 20 MG tablet Take 1/4-1/2 tablet (5-10 mg) daily as needed for attention/focus 30 tablet 0    buPROPion (WELLBUTRIN XL) 150 MG 24 hr tablet Take 1 tablet (150 mg) by mouth every 48 hours. Every other day 45 tablet 3    buPROPion (WELLBUTRIN XL) 300 MG 24 hr tablet Take 1 tablet (300 mg) by mouth every 48 hours. Every " other day 45 tablet 3    valACYclovir (VALTREX) 1000 mg tablet Take 1 tablet (1,000 mg) by mouth 2 times daily. 20 tablet 2    Vitamin D, Cholecalciferol, 25 MCG (1000 UT) CAPS Take by mouth.       She is thinking about decreasing her Wellbutrin as mental health is very good. In counseling. We discussed the need to taper slowly.     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     Single. No children.     Enjoys: Yoga, Hiking and TV.        Advance Care Planning    Discussed advance care planning with patient; informed AVS has link to [employer].        6/18/2025   General Health   How would you rate your overall physical health? Good   Feel stress (tense, anxious, or unable to sleep) To some extent   (!) STRESS CONCERN      6/18/2025   Nutrition   Three or more servings of calcium each day? Yes   Diet: Regular (no restrictions)   How many servings of fruit and vegetables per day? (!) 2-3   How many sweetened beverages each day? 0-1         6/18/2025   Exercise   Days per week of moderate/strenous exercise 3 days   Average minutes spent exercising at this level 30 min         6/18/2025   Social Factors   Frequency of gathering with friends or relatives Three times a week   Worry food won't last until get money to buy more No   Food not last or not have enough money for food? No   Do you have housing? (Housing is defined as stable permanent housing and does not include staying outside in a car, in a tent, in an abandoned building, in an overnight shelter, or couch-surfing.) No   Are you worried about losing your housing? No   Lack of transportation? No    Unable to get utilities (heat,electricity)? No   Want help with housing or utility concern? No   (!) HOUSING CONCERN PRESENT      6/18/2025   Dental   Dentist two times every year? Yes       Today's PHQ-9 Score:       6/18/2025    11:26 AM   PHQ-9 SCORE   PHQ-9 Total Score MyChart 5 (Mild depression)   PHQ-9 Total Score 5        Patient-reported         6/18/2025   Substance Use   Alcohol more than 3/day or more than 7/wk No   Do you use any other substances recreationally? (!) CANNABIS PRODUCTS     Social History     Tobacco Use    Smoking status: Never     Passive exposure: Never    Smokeless tobacco: Never   Vaping Use    Vaping status: Never Used   Substance Use Topics    Alcohol use: Not Currently     Comment: former    Drug use: Yes     Types: Marijuana, Psilocybin     Comment: occasional use           6/13/2023   LAST FHS-7 RESULTS   1st degree relative breast or ovarian cancer No   Any relative bilateral breast cancer No   Any male have breast cancer No   Any ONE woman have BOTH breast AND ovarian cancer No   Any woman with breast cancer before 50yrs No   2 or more relatives with breast AND/OR ovarian cancer No   2 or more relatives with breast AND/OR bowel cancer No        Mammogram Screening - Mammogram every 1-2 years updated in Health Maintenance based on mutual decision making        6/18/2025   STI Screening   New sexual partner(s) since last STI/HIV test? (!) YES. Has no symptoms but wishes to be checked.      History of abnormal Pap smear: No - age 30- 64 PAP with HPV every 5 years recommended        Latest Ref Rng & Units 7/15/2024     3:27 PM 5/19/2021    12:00 AM   PAP / HPV   PAP  Negative for Intraepithelial Lesion or Malignancy (NILM)     HPV 16 DNA Negative Negative     HPV 18 DNA Negative Negative     Other HR HPV Negative Negative     PAP-ABSTRACT   See Scanned Document      ASCVD Risk   The 10-year ASCVD risk score (Anastasia HUNTER, et al., 2019) is: 1.1%    Values used to calculate  "the score:      Age: 45 years      Sex: Female      Is Non- : No      Diabetic: No      Tobacco smoker: No      Systolic Blood Pressure: 124 mmHg      Is BP treated: No      HDL Cholesterol: 38 mg/dL      Total Cholesterol: 178 mg/dL        6/18/2025   Contraception/Family Planning   Questions about contraception or family planning No   What are your periods like? Regular        Reviewed and updated as needed this visit by Provider     Meds                   Objective    Exam  /75 (BP Location: Left arm, Patient Position: Sitting, Cuff Size: Adult Regular)   Pulse 74   Temp 98  F (36.7  C) (Temporal)   Resp 16   Ht 1.727 m (5' 8\")   Wt 79.8 kg (176 lb)   LMP 06/09/2025 (Exact Date)   SpO2 98%   BMI 26.76 kg/m     Estimated body mass index is 26.76 kg/m  as calculated from the following:    Height as of this encounter: 1.727 m (5' 8\").    Weight as of this encounter: 79.8 kg (176 lb).    Physical Exam  GENERAL: alert and no distress  EYES: Eyes grossly normal to inspection, PERRL and conjunctivae and sclerae normal  HENT: ear canals and TM's normal, nose and mouth without ulcers or lesions  NECK: no adenopathy, no asymmetry, masses, or scars  RESP: lungs clear to auscultation - no rales, rhonchi or wheezes  CV: regular rate and rhythm, normal S1 S2, no S3 or S4, no murmur, click or rub, no peripheral edema  ABDOMEN: soft, nontender, no hepatosplenomegaly, no masses and bowel sounds normal  MS: no gross musculoskeletal defects noted, no edema  SKIN: Has patchy vitiligo, e.g. on flexor surfaces of elbows and on feet. Chronic and unchanged  NEURO: Normal strength and tone, mentation intact and speech normal  PSYCH: mentation appears normal, affect normal/bright        Signed Electronically by: Alfred Newsome MD    "

## 2025-06-24 ENCOUNTER — RESULTS FOLLOW-UP (OUTPATIENT)
Dept: FAMILY MEDICINE | Facility: CLINIC | Age: 45
End: 2025-06-24

## 2025-06-25 ENCOUNTER — TELEPHONE (OUTPATIENT)
Dept: FAMILY MEDICINE | Facility: CLINIC | Age: 45
End: 2025-06-25

## 2025-06-25 NOTE — TELEPHONE ENCOUNTER
Medication questions (route to triage team)    Who is calling - Lizzy   Full medication name and dosage -     amphetamine-dextroamphetamine (ADDERALL) 20 MG tablet     Question/clarification needed - Pharmacy calling for early refill of Adderall for pt. Pt is leaving for [place] on 6/26. 6/4 was the last refill. Pharmacy has a 3 day policy and is requesting permission  Name and location of pharmacy   [facility] - [place], [place] - [address] [place], [place] [id number]  [address] [place], [place] [id number]  [facility] [id number]  Phone: [phone] Fax: [phone] CHOOSE OPTION 5!!!  Ok to leave a message on ? Yes

## 2025-06-25 NOTE — TELEPHONE ENCOUNTER
Provided verbal order to fill early due to patient travel.    Willie MACKAY, RN  06/25/25 10:57 AM

## 2025-08-25 ASSESSMENT — PATIENT HEALTH QUESTIONNAIRE - PHQ9
SUM OF ALL RESPONSES TO PHQ QUESTIONS 1-9: 6
10. IF YOU CHECKED OFF ANY PROBLEMS, HOW DIFFICULT HAVE THESE PROBLEMS MADE IT FOR YOU TO DO YOUR WORK, TAKE CARE OF THINGS AT HOME, OR GET ALONG WITH OTHER PEOPLE: SOMEWHAT DIFFICULT
SUM OF ALL RESPONSES TO PHQ QUESTIONS 1-9: 6

## 2025-08-25 ASSESSMENT — ANXIETY QUESTIONNAIRES
5. BEING SO RESTLESS THAT IT IS HARD TO SIT STILL: SEVERAL DAYS
2. NOT BEING ABLE TO STOP OR CONTROL WORRYING: NOT AT ALL
GAD7 TOTAL SCORE: 6
4. TROUBLE RELAXING: SEVERAL DAYS
6. BECOMING EASILY ANNOYED OR IRRITABLE: SEVERAL DAYS
1. FEELING NERVOUS, ANXIOUS, OR ON EDGE: SEVERAL DAYS
8. IF YOU CHECKED OFF ANY PROBLEMS, HOW DIFFICULT HAVE THESE MADE IT FOR YOU TO DO YOUR WORK, TAKE CARE OF THINGS AT HOME, OR GET ALONG WITH OTHER PEOPLE?: SOMEWHAT DIFFICULT
IF YOU CHECKED OFF ANY PROBLEMS ON THIS QUESTIONNAIRE, HOW DIFFICULT HAVE THESE PROBLEMS MADE IT FOR YOU TO DO YOUR WORK, TAKE CARE OF THINGS AT HOME, OR GET ALONG WITH OTHER PEOPLE: SOMEWHAT DIFFICULT
7. FEELING AFRAID AS IF SOMETHING AWFUL MIGHT HAPPEN: SEVERAL DAYS
7. FEELING AFRAID AS IF SOMETHING AWFUL MIGHT HAPPEN: SEVERAL DAYS
3. WORRYING TOO MUCH ABOUT DIFFERENT THINGS: SEVERAL DAYS
GAD7 TOTAL SCORE: 6
GAD7 TOTAL SCORE: 6

## 2025-08-26 ENCOUNTER — OFFICE VISIT (OUTPATIENT)
Dept: FAMILY MEDICINE | Facility: CLINIC | Age: 45
End: 2025-08-26
Payer: COMMERCIAL

## 2025-08-26 VITALS
OXYGEN SATURATION: 98 % | RESPIRATION RATE: 16 BRPM | DIASTOLIC BLOOD PRESSURE: 75 MMHG | SYSTOLIC BLOOD PRESSURE: 115 MMHG | TEMPERATURE: 98.3 F | BODY MASS INDEX: 27.14 KG/M2 | HEART RATE: 70 BPM | WEIGHT: 178.5 LBS

## 2025-08-26 DIAGNOSIS — Z12.31 ENCOUNTER FOR SCREENING MAMMOGRAM FOR MALIGNANT NEOPLASM OF BREAST: ICD-10-CM

## 2025-08-26 DIAGNOSIS — F41.9 ANXIETY: ICD-10-CM

## 2025-08-26 DIAGNOSIS — Z11.3 SCREENING EXAMINATION FOR VENEREAL DISEASE: ICD-10-CM

## 2025-08-26 DIAGNOSIS — F90.0 ADHD (ATTENTION DEFICIT HYPERACTIVITY DISORDER), INATTENTIVE TYPE: Primary | ICD-10-CM

## 2025-08-26 PROBLEM — L80 VITILIGO: Status: ACTIVE | Noted: 2025-08-26

## 2025-08-26 LAB
C TRACH DNA SPEC QL PROBE+SIG AMP: NEGATIVE
N GONORRHOEA DNA SPEC QL NAA+PROBE: NEGATIVE
SPECIMEN TYPE: NORMAL

## 2025-08-26 RX ORDER — ALPRAZOLAM 0.25 MG
0.25 TABLET ORAL
Qty: 45 TABLET | Refills: 0 | Status: SHIPPED | OUTPATIENT
Start: 2025-08-26

## 2025-08-26 RX ORDER — DEXTROAMPHETAMINE SACCHARATE, AMPHETAMINE ASPARTATE, DEXTROAMPHETAMINE SULFATE AND AMPHETAMINE SULFATE 5; 5; 5; 5 MG/1; MG/1; MG/1; MG/1
TABLET ORAL
Qty: 30 TABLET | Refills: 0 | Status: SHIPPED | OUTPATIENT
Start: 2025-08-26

## 2025-08-27 ENCOUNTER — PATIENT OUTREACH (OUTPATIENT)
Dept: CARE COORDINATION | Facility: CLINIC | Age: 45
End: 2025-08-27
Payer: COMMERCIAL

## 2025-08-27 LAB
HIV 1+2 AB+HIV1 P24 AG SERPL QL IA: NONREACTIVE
T PALLIDUM AB SER QL: NONREACTIVE

## 2025-08-29 ENCOUNTER — ANCILLARY PROCEDURE (OUTPATIENT)
Dept: MAMMOGRAPHY | Facility: CLINIC | Age: 45
End: 2025-08-29
Attending: FAMILY MEDICINE
Payer: COMMERCIAL

## 2025-08-29 DIAGNOSIS — Z12.31 ENCOUNTER FOR SCREENING MAMMOGRAM FOR MALIGNANT NEOPLASM OF BREAST: ICD-10-CM

## 2025-08-29 PROCEDURE — 77063 BREAST TOMOSYNTHESIS BI: CPT | Mod: GC | Performed by: RADIOLOGY

## 2025-08-29 PROCEDURE — 77067 SCR MAMMO BI INCL CAD: CPT | Mod: GC | Performed by: RADIOLOGY

## 2025-08-31 ENCOUNTER — HEALTH MAINTENANCE LETTER (OUTPATIENT)
Age: 45
End: 2025-08-31

## (undated) DEVICE — ENDO BITE BLOCK ADULT OMNI-BLOC

## (undated) DEVICE — KIT ENDO FIRST STEP DISINFECTANT 200ML W/POUCH EP-4

## (undated) DEVICE — GLOVE EXAM NITRILE LG PF LATEX FREE 5064

## (undated) DEVICE — KIT ENDO TURNOVER/PROCEDURE CARRY-ON 101822

## (undated) DEVICE — TUBING SUCTION MEDI-VAC 1/4"X20' N620A

## (undated) DEVICE — SUCTION MANIFOLD NEPTUNE 2 SYS 1 PORT 702-025-000

## (undated) DEVICE — SPECIMEN CONTAINER 3OZ W/FORMALIN 59901

## (undated) DEVICE — ENDO FORCEP SPIKED SERRATED SHAFT JUMBO 239CM G56998

## (undated) DEVICE — SUCTION CATH AIRLIFE TRI-FLO W/CONTROL PORT 14FR  T60C

## (undated) DEVICE — INFLATION DEVICE BIG 60 ENDO-AN6012

## (undated) DEVICE — GOWN IMPERVIOUS 2XL BLUE

## (undated) DEVICE — SNARE CAPIVATOR ROUND COLD SNR BX10 M00561101

## (undated) DEVICE — SOL WATER IRRIG 500ML BOTTLE 2F7113

## (undated) DEVICE — CATH BALLOON MERIT ESOPH FIVE-STAGE 17X21MMX180CM EX18

## (undated) DEVICE — SYR 30ML SLIP TIP W/O NDL 302833

## (undated) RX ORDER — DIPHENHYDRAMINE HYDROCHLORIDE 50 MG/ML
INJECTION INTRAMUSCULAR; INTRAVENOUS
Status: DISPENSED
Start: 2024-03-19

## (undated) RX ORDER — FENTANYL CITRATE 50 UG/ML
INJECTION, SOLUTION INTRAMUSCULAR; INTRAVENOUS
Status: DISPENSED
Start: 2024-03-19